# Patient Record
Sex: FEMALE | Race: WHITE | NOT HISPANIC OR LATINO | Employment: FULL TIME | URBAN - METROPOLITAN AREA
[De-identification: names, ages, dates, MRNs, and addresses within clinical notes are randomized per-mention and may not be internally consistent; named-entity substitution may affect disease eponyms.]

---

## 2017-05-03 ENCOUNTER — COMMUNICATION - HEALTHEAST (OUTPATIENT)
Dept: FAMILY MEDICINE | Facility: CLINIC | Age: 32
End: 2017-05-03

## 2017-05-10 ENCOUNTER — COMMUNICATION - HEALTHEAST (OUTPATIENT)
Dept: FAMILY MEDICINE | Facility: CLINIC | Age: 32
End: 2017-05-10

## 2017-07-18 ENCOUNTER — OFFICE VISIT - HEALTHEAST (OUTPATIENT)
Dept: FAMILY MEDICINE | Facility: CLINIC | Age: 32
End: 2017-07-18

## 2017-07-18 DIAGNOSIS — J35.1 CHRONIC TONSILLAR HYPERTROPHY: ICD-10-CM

## 2017-07-18 DIAGNOSIS — Z30.41 ENCOUNTER FOR SURVEILLANCE OF CONTRACEPTIVE PILLS: ICD-10-CM

## 2017-07-18 ASSESSMENT — MIFFLIN-ST. JEOR: SCORE: 1621.21

## 2018-06-28 ENCOUNTER — COMMUNICATION - HEALTHEAST (OUTPATIENT)
Dept: FAMILY MEDICINE | Facility: CLINIC | Age: 33
End: 2018-06-28

## 2018-06-28 DIAGNOSIS — Z30.41 ENCOUNTER FOR SURVEILLANCE OF CONTRACEPTIVE PILLS: ICD-10-CM

## 2018-09-20 ENCOUNTER — COMMUNICATION - HEALTHEAST (OUTPATIENT)
Dept: FAMILY MEDICINE | Facility: CLINIC | Age: 33
End: 2018-09-20

## 2018-09-20 DIAGNOSIS — Z30.41 ENCOUNTER FOR SURVEILLANCE OF CONTRACEPTIVE PILLS: ICD-10-CM

## 2018-12-14 ENCOUNTER — COMMUNICATION - HEALTHEAST (OUTPATIENT)
Dept: FAMILY MEDICINE | Facility: CLINIC | Age: 33
End: 2018-12-14

## 2018-12-14 DIAGNOSIS — Z30.41 ENCOUNTER FOR SURVEILLANCE OF CONTRACEPTIVE PILLS: ICD-10-CM

## 2019-03-14 ENCOUNTER — COMMUNICATION - HEALTHEAST (OUTPATIENT)
Dept: FAMILY MEDICINE | Facility: CLINIC | Age: 34
End: 2019-03-14

## 2019-03-14 DIAGNOSIS — Z30.41 ENCOUNTER FOR SURVEILLANCE OF CONTRACEPTIVE PILLS: ICD-10-CM

## 2019-08-12 ENCOUNTER — COMMUNICATION - HEALTHEAST (OUTPATIENT)
Dept: FAMILY MEDICINE | Facility: CLINIC | Age: 34
End: 2019-08-12

## 2019-08-12 DIAGNOSIS — Z30.41 ENCOUNTER FOR SURVEILLANCE OF CONTRACEPTIVE PILLS: ICD-10-CM

## 2019-11-15 ENCOUNTER — AMBULATORY - HEALTHEAST (OUTPATIENT)
Dept: FAMILY MEDICINE | Facility: CLINIC | Age: 34
End: 2019-11-15

## 2019-11-18 ENCOUNTER — OFFICE VISIT - HEALTHEAST (OUTPATIENT)
Dept: FAMILY MEDICINE | Facility: CLINIC | Age: 34
End: 2019-11-18

## 2019-11-18 DIAGNOSIS — Z00.00 ROUTINE GENERAL MEDICAL EXAMINATION AT A HEALTH CARE FACILITY: ICD-10-CM

## 2019-11-18 DIAGNOSIS — Z12.4 SCREENING FOR MALIGNANT NEOPLASM OF CERVIX: ICD-10-CM

## 2019-11-18 ASSESSMENT — MIFFLIN-ST. JEOR: SCORE: 1667.7

## 2019-11-20 LAB
HPV SOURCE: NORMAL
HUMAN PAPILLOMA VIRUS 16 DNA: NEGATIVE
HUMAN PAPILLOMA VIRUS 18 DNA: NEGATIVE
HUMAN PAPILLOMA VIRUS FINAL DIAGNOSIS: NORMAL
HUMAN PAPILLOMA VIRUS OTHER HR: NEGATIVE
SPECIMEN DESCRIPTION: NORMAL

## 2019-11-22 ENCOUNTER — COMMUNICATION - HEALTHEAST (OUTPATIENT)
Dept: FAMILY MEDICINE | Facility: CLINIC | Age: 34
End: 2019-11-22

## 2020-02-19 ENCOUNTER — COMMUNICATION - HEALTHEAST (OUTPATIENT)
Dept: FAMILY MEDICINE | Facility: CLINIC | Age: 35
End: 2020-02-19

## 2020-02-19 DIAGNOSIS — Z30.41 ENCOUNTER FOR SURVEILLANCE OF CONTRACEPTIVE PILLS: ICD-10-CM

## 2021-04-26 ENCOUNTER — COMMUNICATION - HEALTHEAST (OUTPATIENT)
Dept: FAMILY MEDICINE | Facility: CLINIC | Age: 36
End: 2021-04-26

## 2021-04-26 DIAGNOSIS — Z30.41 ENCOUNTER FOR SURVEILLANCE OF CONTRACEPTIVE PILLS: ICD-10-CM

## 2021-05-31 VITALS — WEIGHT: 179.5 LBS | BODY MASS INDEX: 24.31 KG/M2 | HEIGHT: 72 IN

## 2021-05-31 NOTE — TELEPHONE ENCOUNTER
RN cannot approve Refill Request    RN can NOT refill this medication Protocol failed and NO refill given. Last office visit: 7/18/2017 Marla Yao MD Last Physical: Visit date not found Last MTM visit: Visit date not found Last visit same specialty: 7/18/2017 Marla Yao MD.  Next visit within 3 mo: Visit date not found  Next physical within 3 mo: Visit date not found      Hiral Yoon, [facility] Triage/Med Refill 8/12/2019    Requested Prescriptions   Pending Prescriptions Disp Refills     norethindrone-ethinyl estradiol (NORTREL 1/35, 28,) 1-35 mg-mcg per tablet 84 tablet 0     Sig: Take 1 tablet by mouth daily. As directed       Oral Contraceptives Protocol Failed - 8/12/2019  3:57 PM        Failed - Visit with PCP or prescribing provider visit in last 12 months      Last office visit with prescriber/PCP: 7/18/2017 Marla Yao MD OR same dept: Visit date not found OR same specialty: 7/18/2017 Marla Yao MD  Last physical: Visit date not found Last MTM visit: Visit date not found   Next visit within 3 mo: Visit date not found  Next physical within 3 mo: Visit date not found  Prescriber OR PCP: Marla Yao MD  Last diagnosis associated with med order: 1. Encounter for surveillance of contraceptive pills  - norethindrone-ethinyl estradiol (NORTREL 1/35, 28,) 1-35 mg-mcg per tablet; Take 1 tablet by mouth daily. As directed  Dispense: 84 tablet; Refill: 0    If protocol passes may refill for 12 months if within 3 months of last provider visit (or a total of 15 months).

## 2021-06-04 VITALS
TEMPERATURE: 98.5 F | DIASTOLIC BLOOD PRESSURE: 74 MMHG | BODY MASS INDEX: 25.47 KG/M2 | HEIGHT: 72 IN | WEIGHT: 188 LBS | RESPIRATION RATE: 12 BRPM | HEART RATE: 72 BPM | SYSTOLIC BLOOD PRESSURE: 104 MMHG

## 2021-06-06 NOTE — TELEPHONE ENCOUNTER
FYI - Status Update  Who is Calling: Pharmacy  Update: Patient is out of medication.  Pharmacy states they have sent previous refill requests last week but no prior request is found.  Please send today.  Okay to leave a detailed message?:  No return call needed

## 2021-06-06 NOTE — TELEPHONE ENCOUNTER
Refill Approved    Rx renewed per Medication Renewal Policy. Medication was last renewed on 8/13/19.    Scarlett Marcano, [facility] Connection Triage/Med Refill 2/19/2020     Requested Prescriptions   Pending Prescriptions Disp Refills     norethindrone-ethinyl estradiol (NORTREL 1/35, 28,) 1-35 mg-mcg per tablet 84 tablet 0     Sig: Take 1 tablet by mouth daily. As directed       Oral Contraceptives Protocol Passed - 2/19/2020 10:08 AM        Passed - Visit with PCP or prescribing provider visit in last 12 months      Last office visit with prescriber/PCP: 7/18/2017 Marla Yao MD OR same dept: Visit date not found OR same specialty: 7/18/2017 Marla Yao MD  Last physical: 11/18/2019 Last MTM visit: Visit date not found   Next visit within 3 mo: Visit date not found  Next physical within 3 mo: Visit date not found  Prescriber OR PCP: Marla Yao MD  Last diagnosis associated with med order: 1. Encounter for surveillance of contraceptive pills  - norethindrone-ethinyl estradiol (NORTREL 1/35, 28,) 1-35 mg-mcg per tablet; Take 1 tablet by mouth daily. As directed  Dispense: 84 tablet; Refill: 0    If protocol passes may refill for 12 months if within 3 months of last provider visit (or a total of 15 months).

## 2021-06-11 NOTE — PROGRESS NOTES
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 No recent infections.      Encounter for surveillance of contraceptive pills- doing well with current pills, continue.   -     norethindrone-ethinyl estradiol (NECON 1/35, 28,) 1-35 mg-mcg per tablet; TAKE 1 TABLET BY MOUTH DAILY AS DIRECTED  Dispense: 90 tablet; Refill: 3      RTC when due for a Pap, sooner prn.     Subjective:    Patient ID: Phoebe Alva is a 32 y.o. female.    HPI:  Here to follow up on her OCP's, not having any problems with them,  In long term monogamous relationship with Ric, who is also an .  Occ. Has spotting between periods, thinks it's related to stress and sleep deprivation.  Hasn't had her tonsils out, can't find a good time to do it because of the recovery time required.     The following portions of the patient's history were reviewed and updated as appropriate: allergies, current medications, past family history, past medical history, past social history, past surgical history and problem list.    Review of Systems      10 sys rev neg other than HPI    Objective:    Physical Exam       Patient is in no apparent physical distress.  Vitals are as recorded, tonsils enlarged, no erythema or exudate. Thyroid not enlarged. Cardiovascular :  Regular rate and rhythm with no murmurs.  Lungs are clear with good air movement bilaterally.  Extremities are without edema.  Gait is normal.  Skin is without rashes.  Mood and affect are appropriate.

## 2021-06-25 NOTE — TELEPHONE ENCOUNTER
RN cannot approve Refill Request    RN can NOT refill this medication overdue for office visits and/or labs.    Santiago Jones, [facility] Triage/Med Refill 3/18/2019    Requested Prescriptions   Pending Prescriptions Disp Refills     NORTREL 1/35, 28, 1-35 mg-mcg per tablet [Pharmacy Med Name: NORTREL 1/35 TABLETS 28] 84 tablet 0     Sig: TAKE 1 TABLET BY MOUTH DAILY AS DIRECTED    Oral Contraceptives Protocol Failed - 3/14/2019 12:03 PM       Failed - Visit with PCP or prescribing provider visit in last 12 months     Last office visit with prescriber/PCP: 7/18/2017 Marla Yao MD OR same dept: Visit date not found OR same specialty: 7/18/2017 Marla Yao MD  Last physical: Visit date not found Last MTM visit: Visit date not found   Next visit within 3 mo: Visit date not found  Next physical within 3 mo: Visit date not found  Prescriber OR PCP: Marla Yao MD  Last diagnosis associated with med order: 1. Encounter for surveillance of contraceptive pills  - NORTREL 1/35, 28, 1-35 mg-mcg per tablet [Pharmacy Med Name: NORTREL 1/35 TABLETS 28]; TAKE 1 TABLET BY MOUTH DAILY AS DIRECTED  Dispense: 84 tablet; Refill: 0    If protocol passes may refill for 12 months if within 3 months of last provider visit (or a total of 15 months).

## 2021-08-08 ENCOUNTER — HEALTH MAINTENANCE LETTER (OUTPATIENT)
Age: 36
End: 2021-08-08

## 2021-10-04 ENCOUNTER — HEALTH MAINTENANCE LETTER (OUTPATIENT)
Age: 36
End: 2021-10-04

## 2021-11-29 ENCOUNTER — OFFICE VISIT (OUTPATIENT)
Dept: FAMILY MEDICINE | Facility: CLINIC | Age: 36
End: 2021-11-29
Payer: COMMERCIAL

## 2021-11-29 VITALS
BODY MASS INDEX: 26.21 KG/M2 | RESPIRATION RATE: 16 BRPM | DIASTOLIC BLOOD PRESSURE: 88 MMHG | HEART RATE: 76 BPM | WEIGHT: 193.5 LBS | SYSTOLIC BLOOD PRESSURE: 116 MMHG | OXYGEN SATURATION: 97 % | HEIGHT: 72 IN | TEMPERATURE: 98.4 F

## 2021-11-29 DIAGNOSIS — L98.9 SKIN LESION: ICD-10-CM

## 2021-11-29 DIAGNOSIS — Z12.4 SCREENING FOR MALIGNANT NEOPLASM OF CERVIX: Primary | ICD-10-CM

## 2021-11-29 DIAGNOSIS — Z23 NEED FOR VACCINATION: ICD-10-CM

## 2021-11-29 PROCEDURE — 0064A COVID-19,PF,MODERNA (18+ YRS BOOSTER .25ML): CPT | Performed by: FAMILY MEDICINE

## 2021-11-29 PROCEDURE — 99213 OFFICE O/P EST LOW 20 MIN: CPT | Mod: 25 | Performed by: FAMILY MEDICINE

## 2021-11-29 PROCEDURE — G0123 SCREEN CERV/VAG THIN LAYER: HCPCS | Performed by: FAMILY MEDICINE

## 2021-11-29 PROCEDURE — 87624 HPV HI-RISK TYP POOLED RSLT: CPT | Performed by: FAMILY MEDICINE

## 2021-11-29 PROCEDURE — 91306 COVID-19,PF,MODERNA (18+ YRS BOOSTER .25ML): CPT | Performed by: FAMILY MEDICINE

## 2021-11-29 RX ORDER — NORETHINDRONE AND ETHINYL ESTRADIOL 1 MG-35MCG
KIT ORAL
COMMUNITY
Start: 2021-11-10 | End: 2022-05-05

## 2021-11-29 ASSESSMENT — MIFFLIN-ST. JEOR: SCORE: 1692.65

## 2021-12-01 LAB
HUMAN PAPILLOMA VIRUS 16 DNA: NEGATIVE
HUMAN PAPILLOMA VIRUS 18 DNA: NEGATIVE
HUMAN PAPILLOMA VIRUS FINAL DIAGNOSIS: ABNORMAL
HUMAN PAPILLOMA VIRUS OTHER HR: POSITIVE

## 2021-12-02 NOTE — PROGRESS NOTES
"Phoebe was seen today for follow up, gyn exam and referral.    Diagnoses and all orders for this visit:    Screening for malignant neoplasm of cervix- no hx abnl Pap  -     Pap Diagnostic Thin Prep with HPV; Future  -     Pap Diagnostic Thin Prep with HPV  -     HPV High Risk Types DNA Cervical    Need for vaccination - booster dose today  -     COVID-19,PF,MODERNA (18+ YRS BOOSTER .25ML)    Skin lesion  Lower left leg, right inner thigh.  Appear benign. She will make appt for removal.         S:  Phoebe is here for a Pap, has been a few years.  No concerns about STD's , in a long-term mutually monogamous relationship with a male.  No issues with her OCP's.    Is working two full-time jobs, enjoys her work.    Has a hard red bump on the back of her left leg that has been there a while that she would like removed because it bothers her.  Another one on her left upper thigh. No personal hx skin cancer.     ROS neg other than HPI  Past Medical, social, family histories, medications, and allergies reviewed and updated  Social History     Tobacco Use     Smoking status: Never Smoker     Smokeless tobacco: Never Used             PAP / HPV Latest Ref Rng & Units 11/29/2021 11/18/2019   PAP Negative for squamous intraepithelial lesion or malignancy. - Negative for squamous intraepithelial lesion or malignancy  Electronically signed by Fadumo Dvais CT (ASCP) on 12/2/2019 at 10:01 AM     HPV16 Negative Negative Negative   HPV18 Negative Negative Negative   HRHPV Negative Positive(A) Negative     O:  /88 (BP Location: Left arm, Patient Position: Sitting, Cuff Size: Adult Regular)   Pulse 76   Temp 98.4  F (36.9  C) (Oral)   Resp 16   Ht 1.842 m (6' 0.5\")   Wt 87.8 kg (193 lb 8 oz)   LMP 11/11/2021 (Exact Date)   SpO2 97%   BMI 25.88 kg/m     Patient is in no apparent physical distress.      Head and face are normal, she is masked.  Conjunctiva are clear.Extremities are without edema.  Gait is normal.  Skin " is without rashes. :  Ext genitalia nl.  Cervix and vag mucosa without lesions.  Nl amt discharge in vaginal vault.  Mood and affect are appropriate.                                                                  She          TINA PA MD  [facility] [place] and face are normal.  Conjunctiva are clear.  Neck is without adenopathy or masses.  Cardiovascular :  Regular rate and rhythm with no murmurs.  Lungs are clear with good air movement bilaterally.Extremities are without edema.  Gait is normal.  Skin is without rashes.  Mood and affect are appropriate.

## 2021-12-06 ENCOUNTER — PATIENT OUTREACH (OUTPATIENT)
Dept: FAMILY MEDICINE | Facility: CLINIC | Age: 36
End: 2021-12-06
Payer: COMMERCIAL

## 2021-12-06 DIAGNOSIS — R87.810 CERVICAL HIGH RISK HPV (HUMAN PAPILLOMAVIRUS) TEST POSITIVE: ICD-10-CM

## 2021-12-06 LAB
BKR LAB AP GYN ADEQUACY: NORMAL
BKR LAB AP GYN INTERPRETATION: NORMAL
BKR LAB AP GYN OTHER FINDINGS: NORMAL
BKR LAB AP HPV REFLEX: NORMAL
BKR LAB AP LMP: NORMAL
BKR LAB AP PREVIOUS ABNORMAL: NORMAL
PATH REPORT.COMMENTS IMP SPEC: NORMAL
PATH REPORT.COMMENTS IMP SPEC: NORMAL
PATH REPORT.RELEVANT HX SPEC: NORMAL

## 2022-03-29 ENCOUNTER — TELEPHONE (OUTPATIENT)
Dept: FAMILY MEDICINE | Facility: CLINIC | Age: 37
End: 2022-03-29
Payer: COMMERCIAL

## 2022-03-29 NOTE — TELEPHONE ENCOUNTER
----- Message from Marla Yao MD sent at 3/29/2022  1:43 PM CDT -----  Regarding: April 11th 9 am appt  She is scheduled for removal of skin lesions, which is a procedure and should be 40 mins instead of 20 .  Please change so she has 40 mins for her appt.  Thanks.

## 2022-03-29 NOTE — TELEPHONE ENCOUNTER
Called and LVM that appt needs to be rescheduled.Appt should be 40 minutes long not 20 minutes.  I did schedule pt for 4.18.22 at 8:40am for a 40 min appt. Advised pt to call back to confirm she received the message.

## 2022-04-18 ENCOUNTER — OFFICE VISIT (OUTPATIENT)
Dept: FAMILY MEDICINE | Facility: CLINIC | Age: 37
End: 2022-04-18
Payer: COMMERCIAL

## 2022-04-18 VITALS
TEMPERATURE: 98.2 F | SYSTOLIC BLOOD PRESSURE: 126 MMHG | WEIGHT: 182.25 LBS | OXYGEN SATURATION: 100 % | HEART RATE: 65 BPM | RESPIRATION RATE: 16 BRPM | BODY MASS INDEX: 24.69 KG/M2 | HEIGHT: 72 IN | DIASTOLIC BLOOD PRESSURE: 78 MMHG

## 2022-04-18 DIAGNOSIS — B07.9 BENIGN SKIN LESION EXCLUDING PLANTAR WART: Primary | ICD-10-CM

## 2022-04-18 DIAGNOSIS — L98.9 BENIGN SKIN LESION EXCLUDING PLANTAR WART: Primary | ICD-10-CM

## 2022-04-18 DIAGNOSIS — L57.0 BENIGN SKIN LESION EXCLUDING PLANTAR WART: Primary | ICD-10-CM

## 2022-04-18 PROCEDURE — 11301 SHAVE SKIN LESION 0.6-1.0 CM: CPT | Performed by: FAMILY MEDICINE

## 2022-04-18 NOTE — PROGRESS NOTES
"Phoebe was seen today for biopsy.    Diagnoses and all orders for this visit:    Benign skin lesion excluding plantar wart  -     SHAV SKIN LESION TRUNK/ARM/LEG 0.6-1.0 CM    Removed without complications.  Instructions given for wound care.  RTC if signs of infection develop or if lesion returns.       S:  Phoebe is here to have a bump removed from her left lower leg. It has been there a long time and is painful if she bumps it or there is pressure on it.  No injury that she can recall.  Has gotten bigger. Has one she can feel in her left inner thigh also.  That one doesn't bother he so she doesn't want it removed today.       ROS neg   Past Medical, social, family histories, medications, and allergies reviewed and updated    Current Outpatient Medications   Medication Instructions     NORTREL 1/35, 28, 1-35 MG-MCG tablet TAKE 1 TABLET BY MOUTH DAILY AS DIRECTED         O:  /78 (BP Location: Left arm, Patient Position: Sitting, Cuff Size: Adult Regular)   Pulse 65   Temp 98.2  F (36.8  C) (Oral)   Resp 16   Ht 1.842 m (6' 0.5\")   Wt 82.7 kg (182 lb 4 oz)   LMP 04/13/2022 (Exact Date)   SpO2 100%   BMI 24.38 kg/m    Patient is in no apparent physical distress.    Head and face are normal.  Conjunctiva are clear. extrem without  edema.  Gait is normal.  Skin is without rashes. 1 cm skin-colored round lesion left calf.  No irregular borders.  No bleeding, irritation, raised borders.  Mood and affect are appropriate.      Procedure Note:  Area prepped and draped in sterile fashion.  1% lidocaine used for local anesthesia.  Lesion excised with 15 blade scalpel. Good hemostasis achieved with sliver nitrate.  Complications none, pt tolerated procedure will.      "

## 2022-05-05 DIAGNOSIS — Z76.0 ENCOUNTER FOR MEDICATION REFILL: Primary | ICD-10-CM

## 2022-05-09 RX ORDER — NORETHINDRONE AND ETHINYL ESTRADIOL 1 MG-35MCG
KIT ORAL
Qty: 84 TABLET | Refills: 3 | Status: SHIPPED | OUTPATIENT
Start: 2022-05-09 | End: 2023-02-08

## 2022-09-11 ENCOUNTER — HEALTH MAINTENANCE LETTER (OUTPATIENT)
Age: 37
End: 2022-09-11

## 2022-11-08 ENCOUNTER — PATIENT OUTREACH (OUTPATIENT)
Dept: FAMILY MEDICINE | Facility: CLINIC | Age: 37
End: 2022-11-08

## 2022-11-08 NOTE — LETTER
November 8, 2022      Phoebe Alva  [address] [facility] [address]        Dear ,    This letter is to remind you that you are due for your follow-up Pap smear and Human Papillomavirus (HPV) test.    Please call [phone] to schedule your appointment at your earliest convenience.    If you have completed the appointment outside of the [facility] system, please have the records forwarded to our office. We will update your chart for your provider to review before your next annual wellness visit.     Thank you for choosing [facility]!      Sincerely,    Your [facility] Care Team

## 2023-01-05 PROBLEM — R87.810 CERVICAL HIGH RISK HPV (HUMAN PAPILLOMAVIRUS) TEST POSITIVE: Status: ACTIVE | Noted: 2021-11-29

## 2023-01-31 NOTE — PROGRESS NOTES
ASSESSMENT: Phoebe Alva is a 38 year old female with past medical history significant for anxiety, acid reflux who presents today for new patient evaluation of chronic intermittent right low back/upper buttock pain.  Pain is most consistent with sacroiliac joint dysfunction.  Patient had intermittent flares of pain.  Most recent flare occurred last month and lasted 10 to 14 days.  Flare has resolved.  She has a positive Candi finger test.  She did have reproduction of pain with acid infarctive numerous x1.  She is neurologically intact.  She has some secondary myofascial pain.    PLAN:  A shared decision making model was used.  The patient's values and choices were respected.  The following represents what was discussed and decided upon by the physician assistant and the patient.      1.  DIAGNOSTIC TESTS: I ordered x-rays of the sacroiliac joints.  - If pain persist she may need an MRI lumbar spine to evaluate for possible pain generators in that area.    2.  PHYSICAL THERAPY:  Discussed the importance of core strengthening, ROM, stretching exercises with the patient and how each of these entities is important in decreasing pain.  Explained to the patient that the purpose of physical therapy is to teach the patient a home exercise program.  These exercises need to be performed every day in order to decrease pain and prevent future occurrences of pain.  I entered a referral to physical therapy.  - We could consider pelvic floor physical therapy if pain persists.    3.  MEDICATIONS: No changes are made to the patient's medications.  She can continue using ibuprofen as needed.    4.  INTERVENTIONS:  - We discussed osteopathic manipulation as an option if physical therapy does not help.  - We also discussed a sacroiliac joint injection if conservative treatments fail.    5.  PATIENT EDUCATION:  - I recommended that the patient get a sacroiliac belt.  She can wear this as needed to help manage pain.    6.   FOLLOW-UP:   Patient will follow up with me in about 2 months.  If she has questions or concerns in the meantime, she should not hesitate to call.      SUBJECTIVE:  Phoebe Alva  Is a 38 year old female who presents today for new patient evaluation of low back/upper buttock pain.  Patient states that she has had some low back problems ever since her early to mid 20s when she had severe back pain for a few days.  Pain has been getting gradually worse over the years and has been ramping up more especially over the past 3 years.  She states that she gets intermittent flareups of pain.  Flareups occur every 1 to 3 months.  Typical flareups resolve on their own within about 5 days with rest.  However, last month patient had her most severe flare of pain ever.  She states this flare began after doing we will pose in yoga.  She felt a shift in her lower back and severe pain.  She continued to have pain lasting 10 to 14 days.  She was unable to work out and had difficulty with stairs.  She had to lie on her back to work.  She states her flare has resolved but she would like to be proactive to prevent recurrent flares.    Patient complains of right low back/upper buttock pain.  Patient points with 1 finger to the right SI joint when asked to point to the pain.  She states that sometimes the pain wraps around the pelvis into the right hip area.  She occasionally feels a numbing tingling sensation in the same area as her pain which can extend into her right posterior thigh.  Denies any pain down the leg.  Denies symptoms distal to the knee.  When she is in a flare any activity makes her pain worse and she has difficulty sleeping because of the pain.  Rest helps to alleviate the pain.  She denies any left-sided symptoms.  When she is in a flare of pain she feels the muscles tighten on the entire right side of her back extending up to her neck.  She is an  and even has difficulty singing because of the tension on  her right side during her last flare.  She denies loss of bowel or bladder control.  Denies recent fevers, chills, sweats.    Treatment to date:  - Chiropractic treatment 2022 with slight relief but she did not feel comfortable continuing with treatment and did not have insurance coverage for this  - Ibuprofen as needed which is helpful    Current Outpatient Medications   Medication Sig Dispense Refill     ibuprofen (ADVIL/MOTRIN) 200 MG tablet Take 200 mg by mouth as needed for pain (Patient not taking: Reported on 2/2/2023)       NORTREL 1/35, 28, 1-35 MG-MCG tablet TAKE 1 TABLET BY MOUTH DAILY AS DIRECTED 84 tablet 3         No Known Allergies    Past Medical History:   Diagnosis Date     Adverse Effect Of Combined Estrogen And Progestogen     Created by Conversion      Cervical high risk HPV (human papillomavirus) test positive 11/29/2021 11/8/13 NIL 11/18/19 NIL pap, neg HPV 11/29/21 NIL pap, +HR HPV (not 16/18). Plan: cotest in 1 year        Patient Active Problem List   Diagnosis     Chronic tonsillar hypertrophy     Cervical high risk HPV (human papillomavirus) test positive   Anxiety  Acid reflux    Surgical history: None    Family history: Mother had cancer    Social history: Patient is single.  She is a nonprofit leader and artistic .  She denies tobacco use.  Drinks alcohol moderately.  Denies illicit drug use.      ROS: Positive for headache, sciatica.  Specifically negative for bowel/bladder dysfunction, fevers,chills, appetite changes, unexplained weight loss.   Otherwise 13 systems reviewed are negative.  Please see the patient's intake questionnaire from today for details.      OBJECTIVE:  PHYSICAL EXAMINATION:    CONSTITUTIONAL:  Vital signs as above.  No acute distress.  The patient is well nourished and well groomed.  PSYCHIATRIC:  The patient is awake, alert, oriented to person, place, time and answering questions appropriately with clear speech.    HEENT: Normocephalic, atraumatic.   Sclera clear.  Neck is supple.  SKIN:  Skin over the face, bilateral lower extremities, and posterior torso is clean, dry, intact without rashes.    GAIT:  Gait is non-antalgic.  The patient is able to heel and toe walk without significant difficulty.    STANDING EXAMINATION: No tenderness palpation bilateral lumbar paraspinous muscles.  Tender to palpation right sacroiliac joint.  Lumbar flexion intact.  Lumbar extension intact.  MUSCLE STRENGTH:  The patient has 5/5 strength for the bilateral hip flexors, knee flexors/extensors, ankle dorsiflexors/plantar flexors, great toe extensors, ankle evertors/invertors.  NEUROLOGICAL: 2+ patellar, and achilles reflexes bilaterally.  Negative Babinski's bilaterally.  No ankle clonus bilaterally. Sensation to light touch is intact in the bilateral L4, L5, and S1 dermatomes.  VASCULAR:  2/4 dorsalis pedis pulses bilaterally.  Bilateral lower extremities are warm.  There is no pitting edema of the bilateral lower extremities.  ABDOMINAL:  Soft, non-distended, non-tender throughout all quadrants.  No pulsatile mass palpated in the left lower quadrant.  LYMPH NODES:  No palpable or tender inguinal lymph nodes.  MUSCULOSKELETAL: Patient does have some hypermobility.  She is able to put her palms on floor.  She is able to extend fingers back 90 degrees.  Mild hyperextension both elbows.  Negative straight leg raise bilaterally.  Negative pelvic distraction test.  Negative thigh thrust bilaterally.  Positive Javier's test recommended Javier's test left.  Negative Gaenslen's tests bilaterally.  Negative Yeoman's test bilaterally.

## 2023-02-02 ENCOUNTER — OFFICE VISIT (OUTPATIENT)
Dept: PHYSICAL MEDICINE AND REHAB | Facility: CLINIC | Age: 38
End: 2023-02-02
Payer: COMMERCIAL

## 2023-02-02 VITALS
SYSTOLIC BLOOD PRESSURE: 128 MMHG | HEART RATE: 72 BPM | WEIGHT: 191.1 LBS | DIASTOLIC BLOOD PRESSURE: 75 MMHG | HEIGHT: 72 IN | BODY MASS INDEX: 25.88 KG/M2

## 2023-02-02 DIAGNOSIS — M53.3 SACROILIAC JOINT PAIN: Primary | ICD-10-CM

## 2023-02-02 PROCEDURE — 99204 OFFICE O/P NEW MOD 45 MIN: CPT | Performed by: PHYSICIAN ASSISTANT

## 2023-02-02 RX ORDER — IBUPROFEN 200 MG
200 TABLET ORAL PRN
COMMUNITY
End: 2023-02-08

## 2023-02-02 ASSESSMENT — PAIN SCALES - GENERAL: PAINLEVEL: NO PAIN (0)

## 2023-02-02 NOTE — PATIENT INSTRUCTIONS
[facility] Scheduling    Please call [phone] to schedule your image(s) (select option#1). There are 2 different locations, see below. You can do walk-in visits for xray only images if you want.     [facility]  [address] [id number]    [place] [id number] [facility] [address]     An order for physicaltherapy has been provided today.  Someone will call you to schedule physical therapy or you can call [phone] to schedule physical therapy.  It will be very important for you to do your physical therapy exercises on aregular basis to decrease your pain and prevent future flares of pain.     You can purchase a sacroiliac belt online.  Wear this as needed to help manage pain.

## 2023-02-02 NOTE — LETTER
2/2/2023         RE: Phoebe Alva  [address] [facility] [address]        Dear Colleague,    Thank you for referring your patient, Phoebe Alva, to the [facility]. Please see a copy of my visit note below.    ASSESSMENT: Phoebe Alva is a 38 year old female with past medical history significant for anxiety, acid reflux who presents today for new patient evaluation of chronic intermittent right low back/upper buttock pain.  Pain is most consistent with sacroiliac joint dysfunction.  Patient had intermittent flares of pain.  Most recent flare occurred last month and lasted 10 to 14 days.  Flare has resolved.  She has a positive Candi finger test.  She did have reproduction of pain with acid infarctive numerous x1.  She is neurologically intact.  She has some secondary myofascial pain.    PLAN:  A shared decision making model was used.  The patient's values and choices were respected.  The following represents what was discussed and decided upon by the physician assistant and the patient.      1.  DIAGNOSTIC TESTS: I ordered x-rays of the sacroiliac joints.  - If pain persist she may need an MRI lumbar spine to evaluate for possible pain generators in that area.    2.  PHYSICAL THERAPY:  Discussed the importance of core strengthening, ROM, stretching exercises with the patient and how each of these entities is important in decreasing pain.  Explained to the patient that the purpose of physical therapy is to teach the patient a home exercise program.  These exercises need to be performed every day in order to decrease pain and prevent future occurrences of pain.  I entered a referral to physical therapy.  - We could consider pelvic floor physical therapy if pain persists.    3.  MEDICATIONS: No changes are made to the patient's medications.  She can continue using ibuprofen as needed.    4.  INTERVENTIONS:  - We discussed osteopathic manipulation as an option if physical  therapy does not help.  - We also discussed a sacroiliac joint injection if conservative treatments fail.    5.  PATIENT EDUCATION:  - I recommended that the patient get a sacroiliac belt.  She can wear this as needed to help manage pain.    6.  FOLLOW-UP:   Patient will follow up with me in about 2 months.  If she has questions or concerns in the meantime, she should not hesitate to call.      SUBJECTIVE:  Phoebe Alva  Is a 38 year old female who presents today for new patient evaluation of low back/upper buttock pain.  Patient states that she has had some low back problems ever since her early to mid 20s when she had severe back pain for a few days.  Pain has been getting gradually worse over the years and has been ramping up more especially over the past 3 years.  She states that she gets intermittent flareups of pain.  Flareups occur every 1 to 3 months.  Typical flareups resolve on their own within about 5 days with rest.  However, last month patient had her most severe flare of pain ever.  She states this flare began after doing we will pose in yoga.  She felt a shift in her lower back and severe pain.  She continued to have pain lasting 10 to 14 days.  She was unable to work out and had difficulty with stairs.  She had to lie on her back to work.  She states her flare has resolved but she would like to be proactive to prevent recurrent flares.    Patient complains of right low back/upper buttock pain.  Patient points with 1 finger to the right SI joint when asked to point to the pain.  She states that sometimes the pain wraps around the pelvis into the right hip area.  She occasionally feels a numbing tingling sensation in the same area as her pain which can extend into her right posterior thigh.  Denies any pain down the leg.  Denies symptoms distal to the knee.  When she is in a flare any activity makes her pain worse and she has difficulty sleeping because of the pain.  Rest helps to alleviate the pain.   She denies any left-sided symptoms.  When she is in a flare of pain she feels the muscles tighten on the entire right side of her back extending up to her neck.  She is an  and even has difficulty singing because of the tension on her right side during her last flare.  She denies loss of bowel or bladder control.  Denies recent fevers, chills, sweats.    Treatment to date:  - Chiropractic treatment 2022 with slight relief but she did not feel comfortable continuing with treatment and did not have insurance coverage for this  - Ibuprofen as needed which is helpful    Current Outpatient Medications   Medication Sig Dispense Refill     ibuprofen (ADVIL/MOTRIN) 200 MG tablet Take 200 mg by mouth as needed for pain (Patient not taking: Reported on 2/2/2023)       NORTREL 1/35, 28, 1-35 MG-MCG tablet TAKE 1 TABLET BY MOUTH DAILY AS DIRECTED 84 tablet 3         No Known Allergies    Past Medical History:   Diagnosis Date     Adverse Effect Of Combined Estrogen And Progestogen     Created by Conversion      Cervical high risk HPV (human papillomavirus) test positive 11/29/2021 11/8/13 NIL 11/18/19 NIL pap, neg HPV 11/29/21 NIL pap, +HR HPV (not 16/18). Plan: cotest in 1 year        Patient Active Problem List   Diagnosis     Chronic tonsillar hypertrophy     Cervical high risk HPV (human papillomavirus) test positive   Anxiety  Acid reflux    Surgical history: None    Family history: Mother had cancer    Social history: Patient is single.  She is a nonprofit leader and artistic .  She denies tobacco use.  Drinks alcohol moderately.  Denies illicit drug use.      ROS: Positive for headache, sciatica.  Specifically negative for bowel/bladder dysfunction, fevers,chills, appetite changes, unexplained weight loss.   Otherwise 13 systems reviewed are negative.  Please see the patient's intake questionnaire from today for details.      OBJECTIVE:  PHYSICAL EXAMINATION:    CONSTITUTIONAL:  Vital signs as  above.  No acute distress.  The patient is well nourished and well groomed.  PSYCHIATRIC:  The patient is awake, alert, oriented to person, place, time and answering questions appropriately with clear speech.    HEENT: Normocephalic, atraumatic.  Sclera clear.  Neck is supple.  SKIN:  Skin over the face, bilateral lower extremities, and posterior torso is clean, dry, intact without rashes.    GAIT:  Gait is non-antalgic.  The patient is able to heel and toe walk without significant difficulty.    STANDING EXAMINATION: No tenderness palpation bilateral lumbar paraspinous muscles.  Tender to palpation right sacroiliac joint.  Lumbar flexion intact.  Lumbar extension intact.  MUSCLE STRENGTH:  The patient has 5/5 strength for the bilateral hip flexors, knee flexors/extensors, ankle dorsiflexors/plantar flexors, great toe extensors, ankle evertors/invertors.  NEUROLOGICAL: 2+ patellar, and achilles reflexes bilaterally.  Negative Babinski's bilaterally.  No ankle clonus bilaterally. Sensation to light touch is intact in the bilateral L4, L5, and S1 dermatomes.  VASCULAR:  2/4 dorsalis pedis pulses bilaterally.  Bilateral lower extremities are warm.  There is no pitting edema of the bilateral lower extremities.  ABDOMINAL:  Soft, non-distended, non-tender throughout all quadrants.  No pulsatile mass palpated in the left lower quadrant.  LYMPH NODES:  No palpable or tender inguinal lymph nodes.  MUSCULOSKELETAL: Patient does have some hypermobility.  She is able to put her palms on floor.  She is able to extend fingers back 90 degrees.  Mild hyperextension both elbows.  Negative straight leg raise bilaterally.  Negative pelvic distraction test.  Negative thigh thrust bilaterally.  Positive Javier's test recommended Javier's test left.  Negative Gaenslen's tests bilaterally.  Negative Yeoman's test bilaterally.            Again, thank you for allowing me to participate in the care of your patient.         Sincerely,        Delmis Stratton PA-C

## 2023-02-08 ENCOUNTER — OFFICE VISIT (OUTPATIENT)
Dept: FAMILY MEDICINE | Facility: CLINIC | Age: 38
End: 2023-02-08
Payer: COMMERCIAL

## 2023-02-08 VITALS
WEIGHT: 195 LBS | BODY MASS INDEX: 26.41 KG/M2 | HEART RATE: 70 BPM | OXYGEN SATURATION: 99 % | HEIGHT: 72 IN | DIASTOLIC BLOOD PRESSURE: 68 MMHG | TEMPERATURE: 97.9 F | SYSTOLIC BLOOD PRESSURE: 108 MMHG | RESPIRATION RATE: 16 BRPM

## 2023-02-08 DIAGNOSIS — Z80.3 FAMILY HISTORY OF MALIGNANT NEOPLASM OF BREAST: ICD-10-CM

## 2023-02-08 DIAGNOSIS — Z00.00 ENCOUNTER FOR MEDICAL EXAMINATION TO ESTABLISH CARE: Primary | ICD-10-CM

## 2023-02-08 DIAGNOSIS — Z12.4 CERVICAL CANCER SCREENING: ICD-10-CM

## 2023-02-08 PROCEDURE — G0145 SCR C/V CYTO,THINLAYER,RESCR: HCPCS | Performed by: FAMILY MEDICINE

## 2023-02-08 PROCEDURE — 99213 OFFICE O/P EST LOW 20 MIN: CPT | Performed by: FAMILY MEDICINE

## 2023-02-08 PROCEDURE — 87624 HPV HI-RISK TYP POOLED RSLT: CPT | Performed by: FAMILY MEDICINE

## 2023-02-08 NOTE — PROGRESS NOTES
"  Phoebe was seen today for gyn exam, establish care.    Diagnoses and all orders for this visit:    Cervical cancer screening: Pap with cotesting today. History of normal cytology with other high risk HPV.   -     Pap Screen with HPV - recommended age 30 - 65 years  -     Cancer Risk Mgmt/Cancer Genetic Counseling Referral; Future    Family history of malignant neoplasm of breast: Mother with history of breast cancer- reviewed recommendation for genetic counseling.     Establish care with new provider: Her previous PCP has left- she is looking to establish with new provider. I reviewed her medical history and updated in epic.    Declines flu shot        Subjective   Phoebe is a 38 year old, presenting for the following health issues:  Gyn Exam      History of Present Illness       Reason for visit:  General checkup and HPV monitoring    She eats 2-3 servings of fruits and vegetables daily.She consumes 0 sweetened beverage(s) daily.She exercises with enough effort to increase her heart rate 30 to 60 minutes per day.  She exercises with enough effort to increase her heart rate 4 days per week.   She is taking medications regularly.     No concerns today  History of other high risk HPV- here for repeat pap with cotesting today  Mother was diagnosed with stage 1 breast cancer last year- she had lumpectomy and radiation and doing well      Review of Systems         Objective    /68 (BP Location: Left arm, Patient Position: Sitting, Cuff Size: Adult Large)   Pulse 70   Temp 97.9  F (36.6  C) (Oral)   Resp 16   Ht 1.842 m (6' 0.5\")   Wt 88.5 kg (195 lb)   LMP 01/27/2023 (Exact Date)   SpO2 99%   BMI 26.08 kg/m    There is no height or weight on file to calculate BMI.  Physical Exam   Gen: well appearing  CV: RRR no m/r/g  Resp: CTAB   (female): External genitalia is without lesions. Introitus is normal, vaginal walls pink and moist without lesions or evidence of trauma. No cervical motion tenderness. No " adnexal masses. No cervical lesions or discharge

## 2023-02-11 LAB
BKR LAB AP GYN ADEQUACY: NORMAL
BKR LAB AP GYN INTERPRETATION: NORMAL
BKR LAB AP HPV REFLEX: NORMAL
BKR LAB AP LMP: NORMAL
BKR LAB AP PREVIOUS ABNL DX: NORMAL
BKR LAB AP PREVIOUS ABNORMAL: NORMAL
PATH REPORT.COMMENTS IMP SPEC: NORMAL
PATH REPORT.COMMENTS IMP SPEC: NORMAL
PATH REPORT.RELEVANT HX SPEC: NORMAL

## 2023-02-14 ENCOUNTER — PATIENT OUTREACH (OUTPATIENT)
Dept: FAMILY MEDICINE | Facility: CLINIC | Age: 38
End: 2023-02-14
Payer: COMMERCIAL

## 2023-03-10 ENCOUNTER — OFFICE VISIT (OUTPATIENT)
Dept: OBGYN | Facility: CLINIC | Age: 38
End: 2023-03-10
Payer: COMMERCIAL

## 2023-03-10 VITALS
BODY MASS INDEX: 26.28 KG/M2 | DIASTOLIC BLOOD PRESSURE: 83 MMHG | HEIGHT: 72 IN | WEIGHT: 194 LBS | OXYGEN SATURATION: 99 % | HEART RATE: 72 BPM | SYSTOLIC BLOOD PRESSURE: 135 MMHG

## 2023-03-10 DIAGNOSIS — Z32.00 PREGNANCY EXAMINATION OR TEST, PREGNANCY UNCONFIRMED: ICD-10-CM

## 2023-03-10 DIAGNOSIS — R87.810 PAP SMEAR OF CERVIX SHOWS HIGH RISK HPV PRESENT: Primary | ICD-10-CM

## 2023-03-10 LAB — HCG UR QL: NEGATIVE

## 2023-03-10 PROCEDURE — 57454 BX/CURETT OF CERVIX W/SCOPE: CPT | Performed by: OBSTETRICS & GYNECOLOGY

## 2023-03-10 PROCEDURE — 88305 TISSUE EXAM BY PATHOLOGIST: CPT | Performed by: PATHOLOGY

## 2023-03-10 PROCEDURE — 81025 URINE PREGNANCY TEST: CPT | Performed by: OBSTETRICS & GYNECOLOGY

## 2023-03-10 NOTE — PATIENT INSTRUCTIONS

## 2023-03-10 NOTE — PROGRESS NOTES
GYN CLINIC VISIT  3/10/2023  CC: colposcopy    HPI:    Phoebe Alva is a 38 year old year old female  who presents for initial colposcopy, referred. Pap smear on 23 showed: normal and with high risk HPV present: OTHER. The prior pap showed normal and with high risk HPV present: OTHER.     Pap history as follows:  13 NIL  19 NIL pap, neg HPV  21 NIL pap, +HR HPV (not 16/18). Plan: cotest in 1 year  21 Pt notified   22 Reminder Mychart, Letter   22 Reminder call-lm  23 NIL pap, +HR HPV (not 16/18). Plan Lejunior due bef 23    Patient's last menstrual period was 2023 (exact date).  UPT today is negative  Patient does not smoke  Type of contraception: vasectomy  Age at first sexual intercourse: 22  Number of sexual partners (lifetime): 6+  Past GYN history: HPV  Prior cervical/vaginal disease: none.  Prior cervical treatment: no treatment.    Vitals:    03/10/23 1426   BP: 135/83   Pulse: 72   SpO2: 99%   Weight: 88 kg (194 lb)   Height: 1.829 m (6')       PROCEDURE:  Before the procedure, it was ensured that the patient was educated regarding the nature of her findings to date, the implications, and what was to be done. She has been made aware of the role of HPV, the natural history of infection, ways to minimize her future risk, the effect of HPV on the cervix, and treatment options available should they be indicated. The details of the colposcopic procedure were reviewed. All questions were answered before proceeding, and informed consent was therefore obtained.      Speculum placed in vagina and excellent visualization of cervix acheived, cervix swabbed x 3 with acetic acid solution.      FINDINGS:  Cervix: small area of acetowhitening noted at 12 o'clock  SCJ seen?: no   ECC done?: Yes  Satisfactory examination?: no  Representative biopsy taken at 12 o'clock. ECC performed with curette followed by cytobrush. Silver nitrate applied to achieve hemostasis.  Specimens labeled and sent to path. Patient tolerated procedure well.     ASSESSMENT: HPV related changes.  PLAN:  1. Pap smear of cervix shows high risk HPV present    - Surgical Pathology Exam  - COLP CERVIX/UPPER VAGINA W BX CERVIX/ENDOCERV CURETT    2. Pregnancy examination or test, pregnancy unconfirmed    - HCG qualitative urine; Future  - HCG qualitative urine     specimens labelled and sent to Pathology, will base further treatment on Pathology findings, treatment options discussed with patient, post biopsy instructions given to patient and call to discuss Pathology results if treatment indicated,plan to send [employer] message if no treatment indicated.     Gabrielle Vega MD

## 2023-03-14 LAB
PATH REPORT.COMMENTS IMP SPEC: NORMAL
PATH REPORT.COMMENTS IMP SPEC: NORMAL
PATH REPORT.FINAL DX SPEC: NORMAL
PATH REPORT.GROSS SPEC: NORMAL
PATH REPORT.MICROSCOPIC SPEC OTHER STN: NORMAL
PATH REPORT.RELEVANT HX SPEC: NORMAL
PHOTO IMAGE: NORMAL

## 2023-03-15 ENCOUNTER — PATIENT OUTREACH (OUTPATIENT)
Dept: FAMILY MEDICINE | Facility: CLINIC | Age: 38
End: 2023-03-15
Payer: COMMERCIAL

## 2023-10-07 ENCOUNTER — HEALTH MAINTENANCE LETTER (OUTPATIENT)
Age: 38
End: 2023-10-07

## 2024-02-20 SDOH — HEALTH STABILITY: PHYSICAL HEALTH: ON AVERAGE, HOW MANY DAYS PER WEEK DO YOU ENGAGE IN MODERATE TO STRENUOUS EXERCISE (LIKE A BRISK WALK)?: 4 DAYS

## 2024-02-20 SDOH — HEALTH STABILITY: PHYSICAL HEALTH: ON AVERAGE, HOW MANY MINUTES DO YOU ENGAGE IN EXERCISE AT THIS LEVEL?: 60 MIN

## 2024-02-20 ASSESSMENT — SOCIAL DETERMINANTS OF HEALTH (SDOH): HOW OFTEN DO YOU GET TOGETHER WITH FRIENDS OR RELATIVES?: MORE THAN THREE TIMES A WEEK

## 2024-02-22 ENCOUNTER — PATIENT OUTREACH (OUTPATIENT)
Dept: ONCOLOGY | Facility: CLINIC | Age: 39
End: 2024-02-22

## 2024-02-22 ENCOUNTER — OFFICE VISIT (OUTPATIENT)
Dept: FAMILY MEDICINE | Facility: CLINIC | Age: 39
End: 2024-02-22
Payer: COMMERCIAL

## 2024-02-22 VITALS
BODY MASS INDEX: 26.81 KG/M2 | RESPIRATION RATE: 20 BRPM | TEMPERATURE: 98 F | OXYGEN SATURATION: 100 % | HEIGHT: 72 IN | WEIGHT: 197.9 LBS | SYSTOLIC BLOOD PRESSURE: 130 MMHG | DIASTOLIC BLOOD PRESSURE: 82 MMHG | HEART RATE: 67 BPM

## 2024-02-22 DIAGNOSIS — Z12.4 CERVICAL CANCER SCREENING: ICD-10-CM

## 2024-02-22 DIAGNOSIS — R87.810 CERVICAL HIGH RISK HPV (HUMAN PAPILLOMAVIRUS) TEST POSITIVE: ICD-10-CM

## 2024-02-22 DIAGNOSIS — Z80.3 FAMILY HISTORY OF MALIGNANT NEOPLASM OF BREAST: ICD-10-CM

## 2024-02-22 DIAGNOSIS — Z00.00 ROUTINE GENERAL MEDICAL EXAMINATION AT A HEALTH CARE FACILITY: Primary | ICD-10-CM

## 2024-02-22 DIAGNOSIS — Z80.8 FAMILY HISTORY OF MELANOMA: ICD-10-CM

## 2024-02-22 DIAGNOSIS — M65.4 DE QUERVAIN'S DISEASE (TENOSYNOVITIS): ICD-10-CM

## 2024-02-22 PROCEDURE — 99213 OFFICE O/P EST LOW 20 MIN: CPT | Mod: 25 | Performed by: FAMILY MEDICINE

## 2024-02-22 PROCEDURE — 90686 IIV4 VACC NO PRSV 0.5 ML IM: CPT | Performed by: FAMILY MEDICINE

## 2024-02-22 PROCEDURE — 90651 9VHPV VACCINE 2/3 DOSE IM: CPT | Performed by: FAMILY MEDICINE

## 2024-02-22 PROCEDURE — 99395 PREV VISIT EST AGE 18-39: CPT | Mod: 25 | Performed by: FAMILY MEDICINE

## 2024-02-22 PROCEDURE — 90471 IMMUNIZATION ADMIN: CPT | Performed by: FAMILY MEDICINE

## 2024-02-22 PROCEDURE — 87624 HPV HI-RISK TYP POOLED RSLT: CPT | Performed by: FAMILY MEDICINE

## 2024-02-22 PROCEDURE — G0145 SCR C/V CYTO,THINLAYER,RESCR: HCPCS | Performed by: FAMILY MEDICINE

## 2024-02-22 PROCEDURE — 90472 IMMUNIZATION ADMIN EACH ADD: CPT | Performed by: FAMILY MEDICINE

## 2024-02-22 PROCEDURE — 90715 TDAP VACCINE 7 YRS/> IM: CPT | Performed by: FAMILY MEDICINE

## 2024-02-22 NOTE — PROGRESS NOTES
"Preventive Care Visit  [facility] CLARE Sharma MD, Family Medicine  Feb 22, 2024    Assessment & Plan     Routine general medical examination at a health care facility: HPV, Tdap, flu given.    Cervical cancer screening  Cervical high risk HPV (human papillomavirus) test positive: Pap with cotesting today. History of other high risk HPV on pap smear- she had subsequent colposcopy last year that was normal.  - Pap Screen with HPV - recommended age 30 - 65 years    Family history of melanoma: Refer to dermatology for annual skin checks- referred to [facility] dermatology.   - Adult Dermatology  Referral    Family history of malignant neoplasm of breast: Mother with history of breast cancer- declined breast exam today. She may qualify for genetic testing or increased surveillance/screening with mammograms/MRI's- refer to genetic counseling for further evaluation.   - Adult Oncology/Hematology  Referral    De Quervain's disease (tenosynovitis): History and exam consistent with de quervain's. Left sided. Positive finkelsteins test. Reviewed supportive cares including: NSAIDS, activity modifications/rest, using de quervain's brace, ice prn, and referral for therapy.   - Occupational Therapy  Referral    Hematoma: lower leg- no concern for fracture or other underlying injury- range of motion in ankle intact. Plan to continuing increasing her activities as tolerated- reviewed typical course of involution/resorption for hematoma.       BMI  Estimated body mass index is 26.66 kg/m  as calculated from the following:    Height as of this encounter: 1.835 m (6' 0.24\").    Weight as of this encounter: 89.8 kg (197 lb 14.4 oz).       Counseling  Appropriate preventive services were discussed with this patient, including applicable screening as appropriate for fall prevention, nutrition, physical activity, Tobacco-use cessation, weight loss and cognition.  Checklist reviewing " preventive services available has been given to the patient.  Reviewed patient's diet, addressing concerns and/or questions.   The patient was instructed to see the dentist every 6 months.       Kendra Sandhu is a 39 year old, presenting for the following:  Physical        2/22/2024     1:31 PM   Additional Questions   Roomed by george weaver   Accompanied by Self        Health Care Directive  Patient does not have a Health Care Directive or Living Will: Discussed advance care planning with patient; information given to patient to review.    HPI    1/10/24, RLE. She had mechanical fall and fell unto concrete hitting her left lower leg. She had significant bruising, but was able to bear weight at the time. Pain has improved significantly but still has slight area of discoloration and bump on right lower leg. She has been exercising without difficulty but has not yet returning to jogging/running.    Pain on left wrist area. Started about 1 month ago. No known injury.         2/20/2024   General Health   How would you rate your overall physical health? Excellent   Feel stress (tense, anxious, or unable to sleep) Rather much   (!) STRESS CONCERN      2/20/2024   Nutrition   Three or more servings of calcium each day? Yes   Diet: Regular (no restrictions)   How many servings of fruit and vegetables per day? (!) 2-3   How many sweetened beverages each day? 0-1         2/20/2024   Exercise   Days per week of moderate/strenous exercise 4 days   Average minutes spent exercising at this level 60 min         2/20/2024   Social Factors   Frequency of gathering with friends or relatives More than three times a week   Worry food won't last until get money to buy more No   Food not last or not have enough money for food? No   Do you have housing?  Yes   Are you worried about losing your housing? No   Lack of transportation? No   Unable to get utilities (heat,electricity)? No         2/20/2024   Dental   Dentist two times every  year? (!) NO         2/20/2024   TB Screening   Were you born outside of [place]?  No         Today's PHQ-2 Score:       2/22/2024     1:26 PM   PHQ-2 ( 1999 [employer])   Q1: Little interest or pleasure in doing things 0   Q2: Feeling down, depressed or hopeless 0   PHQ-2 Score 0   Q1: Little interest or pleasure in doing things Not at all   Q2: Feeling down, depressed or hopeless Not at all   PHQ-2 Score 0           2/20/2024   Substance Use   Alcohol more than 3/day or more than 7/wk No   Do you use any other substances recreationally? (!) ALCOHOL    (!) CANNABIS PRODUCTS     Social History     Tobacco Use    Smoking status: Never     Passive exposure: Never    Smokeless tobacco: Never   Vaping Use    Vaping Use: Never used   Substance Use Topics    Alcohol use: Yes     Alcohol/week: 1.0 standard drink of alcohol     Types: 1 Glasses of wine per week     Comment: 1 glass wine, 5 days of week    Drug use: Never                    2/20/2024   One time HIV Screening   Previous HIV test? No         2/20/2024   STI Screening   New sexual partner(s) since last STI/HIV test? No     History of abnormal Pap smear: yes +other high risk HPV with normal colposcopy last year        Latest Ref Rng & Units 2/8/2023     8:43 AM 11/29/2021    11:26 AM 11/18/2019     6:27 PM   PAP / HPV   PAP  Negative for Intraepithelial Lesion or Malignancy (NILM)  Negative for Intraepithelial Lesion or Malignancy (NILM)  Negative for squamous intraepithelial lesion or malignancy  Electronically signed by Fadumo Davis CT (ASCP) on 12/2/2019 at 10:01 AM      HPV 16 DNA Negative Negative  Negative  Negative    HPV 18 DNA Negative Negative  Negative  Negative    Other HR HPV Negative Positive  Positive  Negative            2/20/2024   Contraception/Family Planning   Questions about contraception or family planning No        Reviewed and updated as needed this visit by Provider                       Objective    Exam  /82 (BP Location: Left  "arm, Patient Position: Sitting, Cuff Size: Adult Regular)   Pulse 67   Temp 98  F (36.7  C) (Oral)   Resp 20   Ht 1.835 m (6' 0.24\")   Wt 89.8 kg (197 lb 14.4 oz)   LMP 02/15/2024 (Exact Date)   SpO2 100%   BMI 26.66 kg/m     Estimated body mass index is 26.66 kg/m  as calculated from the following:    Height as of this encounter: 1.835 m (6' 0.24\").    Weight as of this encounter: 89.8 kg (197 lb 14.4 oz).    Physical Exam  General: Alert and oriented, in NAD.  Eyes: PERRL, EOMI, sclera normal.  HENT: Normocephalic, no pharyngeal erythema, MMM.  Neck: Supple, no adenopathy.  Breast: declines  Heart: Normal S1 and S2, regular rhythm. No murmurs, gallops, rubs.  Lungs: CTA bilaterally, no wheezes, no crackles, no rhonchi.  MSK: Ankle with normal range of motion and strength. Left wrist with +finkelstein test.  Neuro: Alert and oriented x 3. Moves all extremities. No focal deficits noted.  Extremities: Peripheral pulses intact, no peripheral edema.  Skin: Small area of induration on anterior lower extremity  Psych: Normal mood and affect.        Signed Electronically by: Raven Sharma MD    "

## 2024-02-22 NOTE — PATIENT INSTRUCTIONS
Call [facility] [place] to schedule appointment    Try ibuprofen 600 mg three times daily (with food) for 7-10 days  Ice as needed  Use a De [employer]ervain's wrist brace     Preventive Care Advice   This is general advice given by our system to help you stay healthy. However, your care team may have specific advice just for you. Please talk to your care team about your preventive care needs.  Nutrition  Eat 5 or more servings of fruits and vegetables each day.  Try wheat bread, brown rice and whole grain pasta (instead of white bread, rice, and pasta).  Get enough calcium and vitamin D. Check the label on foods and aim for 100% of the RDA (recommended daily allowance).  Lifestyle  Exercise at least 150 minutes each week  (30 minutes a day, 5 days a week).  Do muscle strengthening activities 2 days a week. These help control your weight and prevent disease.  No smoking.  Wear sunscreen to prevent skin cancer.  Have a dental exam and cleaning every 6 months.  Yearly exams  See your health care team every year to talk about:  Any changes in your health.  Any medicines your care team has prescribed.  Preventive care, family planning, and ways to prevent chronic diseases.  Shots (vaccines)   HPV shots (up to age 26), if you've never had them before.  Hepatitis B shots (up to age 59), if you've never had them before.  COVID-19 shot: Get this shot when it's due.  Flu shot: Get a flu shot every year.  Tetanus shot: Get a tetanus shot every 10 years.  Pneumococcal, hepatitis A, and RSV shots: Ask your care team if you need these based on your risk.  Shingles shot (for age 50 and up)  General health tests  Diabetes screening:  Starting at age 35, Get screened for diabetes at least every 3 years.  If you are younger than age 35, ask your care team if you should be screened for diabetes.  Cholesterol test: At age 39, start having a cholesterol test every 5 years, or more often if advised.  Bone density scan (DEXA): At  age 50, ask your care team if you should have this scan for osteoporosis (brittle bones).  Hepatitis C: Get tested at least once in your life.  STIs (sexually transmitted infections)  Before age 24: Ask your care team if you should be screened for STIs.  After age 24: Get screened for STIs if you're at risk. You are at risk for STIs (including HIV) if:  You are sexually active with more than one person.  You don't use condoms every time.  You or a partner was diagnosed with a sexually transmitted infection.  If you are at risk for HIV, ask about PrEP medicine to prevent HIV.  Get tested for HIV at least once in your life, whether you are at risk for HIV or not.  Cancer screening tests  Cervical cancer screening: If you have a cervix, begin getting regular cervical cancer screening tests starting at age 21.  Breast cancer scan (mammogram): If you've ever had breasts, begin having regular mammograms starting at age 40. This is a scan to check for breast cancer.  Colon cancer screening: It is important to start screening for colon cancer at age 45.  Have a colonoscopy test every 10 years (or more often if you're at risk) Or, ask your provider about stool tests like a FIT test every year or Cologuard test every 3 years.  To learn more about your testing options, visit:   https://www.babberly/890041.pdf.  For help making a decision, visit:   https://bit.ly/nt56888.  Prostate cancer screening test: If you have a prostate, ask your care team if a prostate cancer screening test (PSA) at age 55 is right for you.  Lung cancer screening: If you are a current or former smoker ages 50 to 80, ask your care team if ongoing lung cancer screenings are right for you.  For informational purposes only. Not to replace the advice of your health care provider. Copyright   2023 [employer]. All rights reserved. Clinically reviewed by the [facility] Transitions Program. [employer] [id number] - REV 01/24.    Learning About  Stress  What is stress?     Stress is your body's response to a hard situation. Your body can have a physical, emotional, or mental response. Stress is a fact of life for most people, and it affects everyone differently. What causes stress for you may not be stressful for someone else.  A lot of things can cause stress. You may feel stress when you go on a job interview, take a test, or run a race. This kind of short-term stress is normal and even useful. It can help you if you need to work hard or react quickly. For example, stress can help you finish an important job on time.  Long-term stress is caused by ongoing stressful situations or events. Examples of long-term stress include long-term health problems, ongoing problems at work, or conflicts in your family. Long-term stress can harm your health.  How does stress affect your health?  When you are stressed, your body responds as though you are in danger. It makes hormones that speed up your heart, make you breathe faster, and give you a burst of energy. This is called the fight-or-flight stress response. If the stress is over quickly, your body goes back to normal and no harm is done.  But if stress happens too often or lasts too long, it can have bad effects. Long-term stress can make you more likely to get sick, and it can make symptoms of some diseases worse. If you tense up when you are stressed, you may develop neck, shoulder, or low back pain. Stress is linked to high blood pressure and heart disease.  Stress also harms your emotional health. It can make you yoon, tense, or depressed. Your relationships may suffer, and you may not do well at work or school.  What can you do to manage stress?  You can try these things to help manage stress:   Do something active. Exercise or activity can help reduce stress. Walking is a great way to get started. Even everyday activities such as housecleaning or yard work can help.  Try yoga or kim chi. These techniques  combine exercise and meditation. You may need some training at first to learn them.  Do something you enjoy. For example, listen to music or go to a movie. Practice your hobby or do volunteer work.  Meditate. This can help you relax, because you are not worrying about what happened before or what may happen in the future.  Do guided imagery. Imagine yourself in any setting that helps you feel calm. You can use online videos, books, or a teacher to guide you.  Do breathing exercises. For example:  From a standing position, bend forward from the waist with your knees slightly bent. Let your arms dangle close to the floor.  Breathe in slowly and deeply as you return to a standing position. Roll up slowly and lift your head last.  Hold your breath for just a few seconds in the standing position.  Breathe out slowly and bend forward from the waist.  Let your feelings out. Talk, laugh, cry, and express anger when you need to. Talking with supportive friends or family, a counselor, or a radha leader about your feelings is a healthy way to relieve stress. Avoid discussing your feelings with people who make you feel worse.  Write. It may help to write about things that are bothering you. This helps you find out how much stress you feel and what is causing it. When you know this, you can find better ways to cope.  What can you do to prevent stress?  You might try some of these things to help prevent stress:  Manage your time. This helps you find time to do the things you want and need to do.  Get enough sleep. Your body recovers from the stresses of the day while you are sleeping.  Get support. Your family, friends, and community can make a difference in how you experience stress.  Limit your news feed. Avoid or limit time on social media or news that may make you feel stressed.  Do something active. Exercise or activity can help reduce stress. Walking is a great way to get started.  Where can you learn more?  Go to  "https://www.[employer].net/patiented  Enter N032 in the search box to learn more about \"Learning About Stress.\"  Current as of: February 26, 2023               Content Version: 13.8    6028-8780 [employer].   Care instructions adapted under license by your healthcare professional. If you have questions about a medical condition or this instruction, always ask your healthcare professional. [employer]wise[employer] ki work disclaims any warranty or liability for your use of this information.      "

## 2024-02-22 NOTE — PROGRESS NOTES
Writer received Cancer Risk Management Program referral, referred for:      Family history of melanoma  Family history of malignant neoplasm of breast        Reviewed for appropriate plan, and sent to New Patient Scheduling for completion.

## 2024-02-27 LAB
BKR LAB AP GYN ADEQUACY: NORMAL
BKR LAB AP GYN INTERPRETATION: NORMAL
BKR LAB AP HPV REFLEX: NORMAL
BKR LAB AP LMP: NORMAL
BKR LAB AP PREVIOUS ABNORMAL: NORMAL
PATH REPORT.COMMENTS IMP SPEC: NORMAL
PATH REPORT.COMMENTS IMP SPEC: NORMAL
PATH REPORT.RELEVANT HX SPEC: NORMAL

## 2024-02-29 LAB
HUMAN PAPILLOMA VIRUS 16 DNA: NEGATIVE
HUMAN PAPILLOMA VIRUS 18 DNA: NEGATIVE
HUMAN PAPILLOMA VIRUS FINAL DIAGNOSIS: NORMAL
HUMAN PAPILLOMA VIRUS OTHER HR: NEGATIVE

## 2025-01-09 ENCOUNTER — OFFICE VISIT (OUTPATIENT)
Dept: FAMILY MEDICINE | Facility: CLINIC | Age: 40
End: 2025-01-09
Attending: FAMILY MEDICINE
Payer: COMMERCIAL

## 2025-01-09 ENCOUNTER — E-VISIT (OUTPATIENT)
Dept: FAMILY MEDICINE | Facility: CLINIC | Age: 40
End: 2025-01-09
Payer: COMMERCIAL

## 2025-01-09 VITALS
OXYGEN SATURATION: 98 % | HEIGHT: 72 IN | WEIGHT: 200 LBS | SYSTOLIC BLOOD PRESSURE: 120 MMHG | BODY MASS INDEX: 27.09 KG/M2 | RESPIRATION RATE: 16 BRPM | TEMPERATURE: 98 F | HEART RATE: 77 BPM | DIASTOLIC BLOOD PRESSURE: 85 MMHG

## 2025-01-09 DIAGNOSIS — R69 DIAGNOSIS UNKNOWN: Primary | ICD-10-CM

## 2025-01-09 DIAGNOSIS — R10.32 LEFT LOWER QUADRANT PAIN: Primary | ICD-10-CM

## 2025-01-09 LAB
BASOPHILS # BLD AUTO: 0 10E3/UL (ref 0–0.2)
BASOPHILS NFR BLD AUTO: 0 %
EOSINOPHIL # BLD AUTO: 0.1 10E3/UL (ref 0–0.7)
EOSINOPHIL NFR BLD AUTO: 1 %
ERYTHROCYTE [DISTWIDTH] IN BLOOD BY AUTOMATED COUNT: 12.1 % (ref 10–15)
HCT VFR BLD AUTO: 40.4 % (ref 35–47)
HGB BLD-MCNC: 13.4 G/DL (ref 11.7–15.7)
IMM GRANULOCYTES # BLD: 0 10E3/UL
IMM GRANULOCYTES NFR BLD: 0 %
LYMPHOCYTES # BLD AUTO: 1.4 10E3/UL (ref 0.8–5.3)
LYMPHOCYTES NFR BLD AUTO: 12 %
MCH RBC QN AUTO: 28.6 PG (ref 26.5–33)
MCHC RBC AUTO-ENTMCNC: 33.2 G/DL (ref 31.5–36.5)
MCV RBC AUTO: 86 FL (ref 78–100)
MONOCYTES # BLD AUTO: 0.5 10E3/UL (ref 0–1.3)
MONOCYTES NFR BLD AUTO: 4 %
NEUTROPHILS # BLD AUTO: 9.4 10E3/UL (ref 1.6–8.3)
NEUTROPHILS NFR BLD AUTO: 83 %
PLATELET # BLD AUTO: 222 10E3/UL (ref 150–450)
RBC # BLD AUTO: 4.69 10E6/UL (ref 3.8–5.2)
WBC # BLD AUTO: 11.4 10E3/UL (ref 4–11)

## 2025-01-09 NOTE — PATIENT INSTRUCTIONS
Dear Phoebe,    We are sorry you are not feeling well. Based on the responses you provided, it is recommended that you be seen in-person in clinic so we can better evaluate your symptoms. I am concerned about the abdominal pain combined with black stools. Please schedule this visit in reportbraint. You will have a Schedule Now button in [employer] to help with scheduling this appointment. Otherwise, you can call [phone] to schedule an appointment. If there are no appointments, I would recommend going to walk in clinic or urgent care.    You will not be charged for this eVisit. Thank you for trusting us with your care.     Raven Sharma MD

## 2025-01-09 NOTE — PROGRESS NOTES
Assessment & Plan     Left lower quadrant pain  Agree with patient's suspicion that the main consideration in this case would be diverticulitis given left lower quadrant pain, subjective fevers, and stool changes.  However there are other possibilities including colon mass, irritable bowel disease, infectious etiology, etc.  For now, we will put her on liquid diet and check labs as below and CT scan within 24 hours.  Urgent evaluation is needed for the possibility of severe life-threatening illness such as severe infection, severe blood loss anemia, etc.  If CT scan does confirm diverticulitis, would be reasonable at this point a week out from symptoms to try antibiotics versus give longer liquid diet chance.  Will discuss with her via MyChart or telephone once results become available.  In the meantime, if she gets persistent pain that is worsening or will not resolve, or other worsening symptoms in general, she would present to the emergency room.  - CT Abdomen Pelvis w Contrast  - Comprehensive metabolic panel (BMP + Alb, Alk Phos, ALT, AST, Total. Bili, TP)  - CBC with platelets and differential  - CRP, inflammation            Subjective   Phoebe is a 40 year old, presenting for the following health issues:  Abdominal Pain (Has been having abdominal pain x1 week, went away and flared up yesterday. Has been having very dark stools/)        1/9/2025     2:43 PM   Additional Questions   Roomed by Sonja ROD     History of Present Illness       Reason for visit:  Abdominal pain and dark stool  Symptom onset:  3-7 days ago   She is taking medications regularly.      Horrible abd pain 1/1/25. Piercing.  LLQ  She suspected diverticulitis and pushed fluids, ate easily digestable food for a few days.  Subdued the pain.  It can gone back to eating normally, but then pain recurred yesterday, not quite as bad.  Hard and painful.      Today stool was dark red, which is what prompted her to make contact with the clinic,  "initially through an e-visit.  Was then told to be seen in person, which brings her here now.  Has had 1 bowel movement since that dark red 1 that was more of a green.  No pain now, but bloated.  Possibly feverish in last several days, although that is better today.  Very tired.    Took ibupfoen, then stopped  Took tylenol and it helped.  No meds today.    LMP yesterday    No pain at the moment, but will be kind of crampy feeling like being constipated.    Never had anything like this before  No family history of colon cancer, inflammatory bowel disease, or other GI illness.                       Objective    /85   Pulse 77   Temp 98  F (36.7  C) (Oral)   Resp 16   Ht 1.835 m (6' 0.24\")   Wt 90.7 kg (200 lb)   LMP 01/09/2025 (Exact Date)   SpO2 98%   BMI 26.94 kg/m    Body mass index is 26.94 kg/m .  Physical Exam     Gen:  A&A, NAD.  Appears comfortable currently.  CV:  HRRR, no M/R/G  Resp:  CTAB  Abd: Soft without any guarding.  There is left lower quadrant tenderness.  There is some rebound.  Ext:  W&D, no edema            Signed Electronically by: Ashley Sandy MD    "

## 2025-01-10 ENCOUNTER — ANCILLARY PROCEDURE (OUTPATIENT)
Dept: CT IMAGING | Facility: CLINIC | Age: 40
End: 2025-01-10
Attending: FAMILY MEDICINE
Payer: COMMERCIAL

## 2025-01-10 DIAGNOSIS — R10.32 LEFT LOWER QUADRANT PAIN: ICD-10-CM

## 2025-01-10 PROCEDURE — 74177 CT ABD & PELVIS W/CONTRAST: CPT | Mod: GC | Performed by: STUDENT IN AN ORGANIZED HEALTH CARE EDUCATION/TRAINING PROGRAM

## 2025-01-10 RX ORDER — IOPAMIDOL 755 MG/ML
98 INJECTION, SOLUTION INTRAVASCULAR ONCE
Status: COMPLETED | OUTPATIENT
Start: 2025-01-10 | End: 2025-01-10

## 2025-01-10 RX ADMIN — IOPAMIDOL 98 ML: 755 INJECTION, SOLUTION INTRAVASCULAR at 15:26

## 2025-01-10 NOTE — DISCHARGE INSTRUCTIONS

## 2025-01-11 DIAGNOSIS — R10.32 LEFT LOWER QUADRANT PAIN: Primary | ICD-10-CM

## 2025-01-11 DIAGNOSIS — K92.1 MELENA: ICD-10-CM

## 2025-02-05 ENCOUNTER — TRANSFERRED RECORDS (OUTPATIENT)
Dept: HEALTH INFORMATION MANAGEMENT | Facility: CLINIC | Age: 40
End: 2025-02-05
Payer: COMMERCIAL

## 2025-02-24 ENCOUNTER — PATIENT OUTREACH (OUTPATIENT)
Dept: ONCOLOGY | Facility: CLINIC | Age: 40
End: 2025-02-24

## 2025-02-24 ENCOUNTER — OFFICE VISIT (OUTPATIENT)
Dept: FAMILY MEDICINE | Facility: CLINIC | Age: 40
End: 2025-02-24
Attending: FAMILY MEDICINE
Payer: COMMERCIAL

## 2025-02-24 VITALS
RESPIRATION RATE: 16 BRPM | TEMPERATURE: 98.1 F | HEART RATE: 76 BPM | WEIGHT: 194.06 LBS | BODY MASS INDEX: 26.28 KG/M2 | SYSTOLIC BLOOD PRESSURE: 108 MMHG | DIASTOLIC BLOOD PRESSURE: 72 MMHG | OXYGEN SATURATION: 98 % | HEIGHT: 72 IN

## 2025-02-24 DIAGNOSIS — Z00.00 ROUTINE GENERAL MEDICAL EXAMINATION AT A HEALTH CARE FACILITY: Primary | ICD-10-CM

## 2025-02-24 DIAGNOSIS — Z80.41 FAMILY HISTORY OF MALIGNANT NEOPLASM OF OVARY: ICD-10-CM

## 2025-02-24 DIAGNOSIS — R10.2 PELVIC PAIN IN FEMALE: ICD-10-CM

## 2025-02-24 DIAGNOSIS — Z80.3 FAMILY HISTORY OF MALIGNANT NEOPLASM OF BREAST: ICD-10-CM

## 2025-02-24 PROCEDURE — G2211 COMPLEX E/M VISIT ADD ON: HCPCS | Performed by: FAMILY MEDICINE

## 2025-02-24 PROCEDURE — 99395 PREV VISIT EST AGE 18-39: CPT | Performed by: FAMILY MEDICINE

## 2025-02-24 PROCEDURE — 99214 OFFICE O/P EST MOD 30 MIN: CPT | Mod: 25 | Performed by: FAMILY MEDICINE

## 2025-02-24 RX ORDER — DICYCLOMINE HYDROCHLORIDE 10 MG/1
10 CAPSULE ORAL 3 TIMES DAILY
COMMUNITY
Start: 2025-02-06

## 2025-02-24 SDOH — HEALTH STABILITY: PHYSICAL HEALTH: ON AVERAGE, HOW MANY DAYS PER WEEK DO YOU ENGAGE IN MODERATE TO STRENUOUS EXERCISE (LIKE A BRISK WALK)?: 4 DAYS

## 2025-02-24 SDOH — HEALTH STABILITY: PHYSICAL HEALTH: ON AVERAGE, HOW MANY MINUTES DO YOU ENGAGE IN EXERCISE AT THIS LEVEL?: 40 MIN

## 2025-02-24 ASSESSMENT — SOCIAL DETERMINANTS OF HEALTH (SDOH): HOW OFTEN DO YOU GET TOGETHER WITH FRIENDS OR RELATIVES?: THREE TIMES A WEEK

## 2025-02-24 NOTE — PROGRESS NOTES
New Patient Oncology Nurse Navigator Note     Referring provider: Raven Sharma MD      Referring Clinic/Organization: [facility]      Referred to (specialty:) Genetic Counseling and Cancer Risk Management     Requested provider (if applicable): NA     Date Referral Received: February 24, 2025     Evaluation for:  Family history of stage 4 ovarian cancer, breast cancer   Z80.3 (ICD-10-CM) - Family history of malignant neoplasm of breast   Z80.41 (ICD-10-CM) - Family history of malignant neoplasm of ovary     Payor: MEDICA / Plan: MEDICA CHOICE / Product Type: Indemnity /     February 24, 2025  Referral received and reviewed.   Sent to NPS to schedule.     Shamika LOVEN, RN, OCN  Oncology Nurse Navigator   [facility]  Cancer Care Service Line   New Patient Hem/Onc Scheduling / Referrals: [phone] (fax: [phone] )

## 2025-02-24 NOTE — PATIENT INSTRUCTIONS
Call [phone] to schedule pelvic US      Patient Education   Preventive Care Advice   This is general advice given by our system to help you stay healthy. However, your care team may have specific advice just for you. Please talk to your care team about your preventive care needs.  Nutrition  Eat 5 or more servings of fruits and vegetables each day.  Try wheat bread, brown rice and whole grain pasta (instead of white bread, rice, and pasta).  Get enough calcium and vitamin D. Check the label on foods and aim for 100% of the RDA (recommended daily allowance).  Lifestyle  Exercise at least 150 minutes each week  (30 minutes a day, 5 days a week).  Do muscle strengthening activities 2 days a week. These help control your weight and prevent disease.  No smoking.  Wear sunscreen to prevent skin cancer.  Have a dental exam and cleaning every 6 months.  Yearly exams  See your health care team every year to talk about:  Any changes in your health.  Any medicines your care team has prescribed.  Preventive care, family planning, and ways to prevent chronic diseases.  Shots (vaccines)   HPV shots (up to age 26), if you've never had them before.  Hepatitis B shots (up to age 59), if you've never had them before.  COVID-19 shot: Get this shot when it's due.  Flu shot: Get a flu shot every year.  Tetanus shot: Get a tetanus shot every 10 years.  Pneumococcal, hepatitis A, and RSV shots: Ask your care team if you need these based on your risk.  Shingles shot (for age 50 and up)  General health tests  Diabetes screening:  Starting at age 35, Get screened for diabetes at least every 3 years.  If you are younger than age 35, ask your care team if you should be screened for diabetes.  Cholesterol test: At age 39, start having a cholesterol test every 5 years, or more often if advised.  Bone density scan (DEXA): At age 50, ask your care team if you should have this scan for osteoporosis (brittle bones).  Hepatitis C: Get tested at  least once in your life.  STIs (sexually transmitted infections)  Before age 24: Ask your care team if you should be screened for STIs.  After age 24: Get screened for STIs if you're at risk. You are at risk for STIs (including HIV) if:  You are sexually active with more than one person.  You don't use condoms every time.  You or a partner was diagnosed with a sexually transmitted infection.  If you are at risk for HIV, ask about PrEP medicine to prevent HIV.  Get tested for HIV at least once in your life, whether you are at risk for HIV or not.  Cancer screening tests  Cervical cancer screening: If you have a cervix, begin getting regular cervical cancer screening tests starting at age 21.  Breast cancer scan (mammogram): If you've ever had breasts, begin having regular mammograms starting at age 40. This is a scan to check for breast cancer.  Colon cancer screening: It is important to start screening for colon cancer at age 45.  Have a colonoscopy test every 10 years (or more often if you're at risk) Or, ask your provider about stool tests like a FIT test every year or Cologuard test every 3 years.  To learn more about your testing options, visit:   .  For help making a decision, visit:   https://bit.ly/ka22228.  Prostate cancer screening test: If you have a prostate, ask your care team if a prostate cancer screening test (PSA) at age 55 is right for you.  Lung cancer screening: If you are a current or former smoker ages 50 to 80, ask your care team if ongoing lung cancer screenings are right for you.  For informational purposes only. Not to replace the advice of your health care provider. Copyright   2023 [facility]. All rights reserved. Clinically reviewed by the [facility] Transitions Program. [employer] [id number] - REV 01/24.  Learning About Stress  What is stress?     Stress is your body's response to a hard situation. Your body can have a physical, emotional, or mental response. Stress is a  fact of life for most people, and it affects everyone differently. What causes stress for you may not be stressful for someone else.  A lot of things can cause stress. You may feel stress when you go on a job interview, take a test, or run a race. This kind of short-term stress is normal and even useful. It can help you if you need to work hard or react quickly. For example, stress can help you finish an important job on time.  Long-term stress is caused by ongoing stressful situations or events. Examples of long-term stress include long-term health problems, ongoing problems at work, or conflicts in your family. Long-term stress can harm your health.  How does stress affect your health?  When you are stressed, your body responds as though you are in danger. It makes hormones that speed up your heart, make you breathe faster, and give you a burst of energy. This is called the fight-or-flight stress response. If the stress is over quickly, your body goes back to normal and no harm is done.  But if stress happens too often or lasts too long, it can have bad effects. Long-term stress can make you more likely to get sick, and it can make symptoms of some diseases worse. If you tense up when you are stressed, you may develop neck, shoulder, or low back pain. Stress is linked to high blood pressure and heart disease.  Stress also harms your emotional health. It can make you yoon, tense, or depressed. Your relationships may suffer, and you may not do well at work or school.  What can you do to manage stress?  You can try these things to help manage stress:   Do something active. Exercise or activity can help reduce stress. Walking is a great way to get started. Even everyday activities such as housecleaning or yard work can help.  Try yoga or kim chi. These techniques combine exercise and meditation. You may need some training at first to learn them.  Do something you enjoy. For example, listen to music or go to a movie.  "Practice your hobby or do volunteer work.  Meditate. This can help you relax, because you are not worrying about what happened before or what may happen in the future.  Do guided imagery. Imagine yourself in any setting that helps you feel calm. You can use online videos, books, or a teacher to guide you.  Do breathing exercises. For example:  From a standing position, bend forward from the waist with your knees slightly bent. Let your arms dangle close to the floor.  Breathe in slowly and deeply as you return to a standing position. Roll up slowly and lift your head last.  Hold your breath for just a few seconds in the standing position.  Breathe out slowly and bend forward from the waist.  Let your feelings out. Talk, laugh, cry, and express anger when you need to. Talking with supportive friends or family, a counselor, or a radha leader about your feelings is a healthy way to relieve stress. Avoid discussing your feelings with people who make you feel worse.  Write. It may help to write about things that are bothering you. This helps you find out how much stress you feel and what is causing it. When you know this, you can find better ways to cope.  What can you do to prevent stress?  You might try some of these things to help prevent stress:  Manage your time. This helps you find time to do the things you want and need to do.  Get enough sleep. Your body recovers from the stresses of the day while you are sleeping.  Get support. Your family, friends, and community can make a difference in how you experience stress.  Limit your news feed. Avoid or limit time on social media or news that may make you feel stressed.  Do something active. Exercise or activity can help reduce stress. Walking is a great way to get started.  Where can you learn more?  Go to https://www.healthwise.net/patiented  Enter N032 in the search box to learn more about \"Learning About Stress.\"  Current as of: October 24, 2023  Content Version: " 14.3    2024 [email or web address].   Care instructions adapted under license by your healthcare professional. If you have questions about a medical condition or this instruction, always ask your healthcare professional. [employer] disclaims any warranty or liability for your use of this information.

## 2025-02-24 NOTE — PROGRESS NOTES
Preventive Care Visit  [facility] CLARE Sharma MD, Family Medicine  Feb 24, 2025      Assessment & Plan     Routine general medical examination at a health care facility    Family history of malignant neoplasm of breast: Refer for genetic counseling.   - Adult Oncology/Hematology  Referral    Family history of malignant neoplasm of ovary: Refer for genetic counseling.  - Adult Oncology/Hematology  Referral    Pelvic pain in female/LLQ pain: Concern for possible diverticulitis, however CT abdomen/pelvis 1/10/25 showed mild splenomegaly and nonspecific enlarged mesenteric lymph nodes, liver hemangiomas, and left adnexal hypodensity, but no diverticulitis, although this was after her symptoms had resolved. Labwork was unremarkable except mild leukocytosis. Plan for pelvic US for further workup. Patient continues to have intermittent flare of symptoms- she has seen [facility] and prescribed dicyclomine for IBS and has follow-up in a few months.   - US Pelvic Complete with Transvaginal                BMI  Estimated body mass index is 26.32 kg/m  as calculated from the following:    Height as of this encounter: 1.829 m (6').    Weight as of this encounter: 88 kg (194 lb 1 oz).       Counseling  Appropriate preventive services were addressed with this patient via screening, questionnaire, or discussion as appropriate for fall prevention, nutrition, physical activity, Tobacco-use cessation, social engagement, weight loss and cognition.  Checklist reviewing preventive services available has been given to the patient.  Reviewed patient's diet, addressing concerns and/or questions.   The patient was instructed to see the dentist every 6 months.   She is at risk for psychosocial distress and has been provided with information to reduce risk.           Kendra Sandhu is a 40 year old, presenting for the following:  Physical (Pt wants to discuss about fallopian tube removal -  mom was  recently diagnosed with stage 4 ovarian cancer in march 2024. ) and GI Problem (Start in January )        2/24/2025     8:39 AM   Additional Questions   Roomed by Ildefonso DELAROSA   Accompanied by Self          GI Problem    Left lower quadrant and middle pelvic pain  Denies urinary symptoms  Denies vaginal discharge, odor  Severe pain- when she has flares  Previously followed a high fiber diet  She was on low residue/fiber diet initially after her first episode, but now is back on high fiber diet as recommended by GI  Still having residual symptoms with intermittent flares with severe pain  [facility]- started on dicyclomine for presumed IBS  Mother- diagnosed stage 4 ovarian cancer- March 2024, also history of stage 1 breast cancer  Partner- hx of vasectomy- does not desire future fertility  Interested in discussing removal of tubes/tubal ligation    Health Care Directive  Patient does not have a Health Care Directive: not on file      2/24/2025   General Health   How would you rate your overall physical health? Good   Feel stress (tense, anxious, or unable to sleep) To some extent   (!) STRESS CONCERN      2/24/2025   Nutrition   Three or more servings of calcium each day? Yes   Diet: Regular (no restrictions)   How many servings of fruit and vegetables per day? 4 or more   How many sweetened beverages each day? 0-1         2/24/2025   Exercise   Days per week of moderate/strenous exercise 4 days   Average minutes spent exercising at this level 40 min         2/24/2025   Social Factors   Frequency of gathering with friends or relatives Three times a week   Worry food won't last until get money to buy more No   Food not last or not have enough money for food? No   Do you have housing? (Housing is defined as stable permanent housing and does not include staying ouside in a car, in a tent, in an abandoned building, in an overnight shelter, or couch-surfing.) Yes   Are you worried about losing your housing? No   Lack of  transportation? No   Unable to get utilities (heat,electricity)? No         2/24/2025   Dental   Dentist two times every year? (!) NO         2/20/2024   TB Screening   Were you born outside of the [place]? No           Today's PHQ-2 Score:       1/9/2025     2:40 PM   PHQ-2 ( 1999 [employer])   Q1: Little interest or pleasure in doing things 0   Q2: Feeling down, depressed or hopeless 0   PHQ-2 Score 0    Q1: Little interest or pleasure in doing things Not at all   Q2: Feeling down, depressed or hopeless Not at all   PHQ-2 Score 0       Patient-reported         2/24/2025   Substance Use   Alcohol more than 3/day or more than 7/wk No   Do you use any other substances recreationally? (!) CANNABIS PRODUCTS     Social History     Tobacco Use    Smoking status: Never     Passive exposure: Never    Smokeless tobacco: Never   Vaping Use    Vaping status: Never Used   Substance Use Topics    Alcohol use: Yes     Alcohol/week: 1.0 standard drink of alcohol     Types: 1 Glasses of wine per week     Comment: 1 glass wine, 5 days of week    Drug use: Never                    2/24/2025   One time HIV Screening   Previous HIV test? No         2/24/2025   STI Screening   New sexual partner(s) since last STI/HIV test? No     History of abnormal Pap smear:         Latest Ref Rng & Units 2/22/2024     1:51 PM 2/8/2023     8:43 AM 11/29/2021    11:26 AM   PAP / HPV   PAP  Negative for Intraepithelial Lesion or Malignancy (NILM)  Negative for Intraepithelial Lesion or Malignancy (NILM)  Negative for Intraepithelial Lesion or Malignancy (NILM)    HPV 16 DNA Negative Negative  Negative  Negative    HPV 18 DNA Negative Negative  Negative  Negative    Other HR HPV Negative Negative  Positive  Positive            2/24/2025   Contraception/Family Planning   Questions about contraception or family planning No        Reviewed and updated as needed this visit by Provider                             Objective    Exam  /72 (BP Location: Left arm,  Patient Position: Sitting, Cuff Size: Adult Regular)   Pulse 76   Temp 98.1  F (36.7  C) (Oral)   Resp 16   Ht 1.829 m (6')   Wt 88 kg (194 lb 1 oz)   LMP 02/03/2025 (Exact Date)   SpO2 98%   BMI 26.32 kg/m     Estimated body mass index is 26.32 kg/m  as calculated from the following:    Height as of this encounter: 1.829 m (6').    Weight as of this encounter: 88 kg (194 lb 1 oz).    Physical Exam  GENERAL: alert and no distress  EYES: Eyes grossly normal to inspection, PERRL and conjunctivae and sclerae normal  HENT: ear canals and TM's normal, nose and mouth without ulcers or lesions  NECK: no adenopathy, no asymmetry, masses, or scars  RESP: lungs clear to auscultation - no rales, rhonchi or wheezes  BREAST: declined by patient  CV: regular rate and rhythm, normal S1 S2, no S3 or S4, no murmur, click or rub, no peripheral edema  ABDOMEN: soft, mild tenderness to palpation in LLQ, no rebound or guarding  NEURO: Normal strength and tone, mentation intact and speech normal  PSYCH: mentation appears normal, affect normal/bright        Signed Electronically by: Raven Sharma MD

## 2025-02-27 ENCOUNTER — MYC MEDICAL ADVICE (OUTPATIENT)
Dept: FAMILY MEDICINE | Facility: CLINIC | Age: 40
End: 2025-02-27
Payer: COMMERCIAL

## 2025-03-03 ENCOUNTER — TELEPHONE (OUTPATIENT)
Dept: FAMILY MEDICINE | Facility: CLINIC | Age: 40
End: 2025-03-03

## 2025-03-03 ENCOUNTER — HOSPITAL ENCOUNTER (OUTPATIENT)
Dept: ULTRASOUND IMAGING | Facility: CLINIC | Age: 40
Discharge: HOME | End: 2025-03-03
Attending: FAMILY MEDICINE | Admitting: FAMILY MEDICINE
Payer: COMMERCIAL

## 2025-03-03 DIAGNOSIS — R10.2 PELVIC PAIN IN FEMALE: ICD-10-CM

## 2025-03-03 DIAGNOSIS — N80.129 ENDOMETRIOMA OF OVARY: Primary | ICD-10-CM

## 2025-03-03 PROCEDURE — 76856 US EXAM PELVIC COMPLETE: CPT | Mod: 26 | Performed by: RADIOLOGY

## 2025-03-03 PROCEDURE — 76830 TRANSVAGINAL US NON-OB: CPT

## 2025-03-03 PROCEDURE — 76830 TRANSVAGINAL US NON-OB: CPT | Mod: 26 | Performed by: RADIOLOGY

## 2025-03-03 NOTE — TELEPHONE ENCOUNTER
Called patient- discussed results of likely endometrioma of R ovary. Patient having LLQ pain but denies any RLQ. Plan to refer to OB-GYN for surveillance/treatment.    Raven Sharma MD

## 2025-03-04 ENCOUNTER — OFFICE VISIT (OUTPATIENT)
Dept: INTERNAL MEDICINE | Facility: CLINIC | Age: 40
End: 2025-03-04
Payer: COMMERCIAL

## 2025-03-04 VITALS
SYSTOLIC BLOOD PRESSURE: 110 MMHG | TEMPERATURE: 98 F | WEIGHT: 194 LBS | DIASTOLIC BLOOD PRESSURE: 72 MMHG | HEIGHT: 72 IN | HEART RATE: 62 BPM | BODY MASS INDEX: 26.28 KG/M2 | RESPIRATION RATE: 14 BRPM

## 2025-03-04 DIAGNOSIS — N63.11 MASS OF UPPER OUTER QUADRANT OF RIGHT BREAST: Primary | ICD-10-CM

## 2025-03-04 PROCEDURE — G2211 COMPLEX E/M VISIT ADD ON: HCPCS | Performed by: NURSE PRACTITIONER

## 2025-03-04 PROCEDURE — 3078F DIAST BP <80 MM HG: CPT | Performed by: NURSE PRACTITIONER

## 2025-03-04 PROCEDURE — 99213 OFFICE O/P EST LOW 20 MIN: CPT | Performed by: NURSE PRACTITIONER

## 2025-03-04 PROCEDURE — 3074F SYST BP LT 130 MM HG: CPT | Performed by: NURSE PRACTITIONER

## 2025-03-04 NOTE — PATIENT INSTRUCTIONS
Orders for diagnostic mammogram and breast ultrasound are in your chart.    You can call the radiology department for scheduling.  [phone].

## 2025-03-04 NOTE — PROGRESS NOTES
Assessment & Plan     Mass of upper outer quadrant of right breast  Messaged into her PCP office last week with concerns of mass in her right breast.  This did seem to start while she was menstruating.  It is not painful.    On exam, there is an ovoid shaped, smooth, movable nontender mass at the 10 o'clock position relative to the areola.    Diagnostic mammogram and breast ultrasound ordered.  I told her that the breast center/imaging center may change these orders which of course is okay    - MA Diagnostic Right w/ Charles; Future  - US Breast Right Limited 1-3 Quadrants; Future      Patient Instructions   Orders for diagnostic mammogram and breast ultrasound are in your chart.    You can call the radiology department for scheduling.  [phone].    The longitudinal plan of care for the diagnosis(es)/condition(s) as documented were addressed during this visit. Due to the added complexity in care, I will continue to support Phoebe in the subsequent management and with ongoing continuity of care.      BMI  Estimated body mass index is 26.31 kg/m  as calculated from the following:    Height as of this encounter: 1.829 m (6').    Weight as of this encounter: 88 kg (194 lb).             Subjective   Phoebe is a 40 year old, presenting for the following health issues:  Mass (Right breast )      3/4/2025     8:32 AM   Additional Questions   Roomed by [facility]    History of Present Illness       Reason for visit:  Lump on right breast  Symptom onset:  3-7 days ago   She is taking medications regularly.                      Objective    /72 (BP Location: Right arm, Patient Position: Sitting, Cuff Size: Adult Regular)   Pulse 62   Temp 98  F (36.7  C) (Oral)   Resp 14   Ht 1.829 m (6')   Wt 88 kg (194 lb)   LMP 02/25/2025 (Exact Date)   Breastfeeding No   BMI 26.31 kg/m    Body mass index is 26.31 kg/m .  Physical Exam   Assessment per A/P above              Signed Electronically by: Tomas Rushing,  CNP

## 2025-03-13 ENCOUNTER — ANCILLARY PROCEDURE (OUTPATIENT)
Dept: MAMMOGRAPHY | Facility: CLINIC | Age: 40
End: 2025-03-13
Attending: NURSE PRACTITIONER
Payer: COMMERCIAL

## 2025-03-13 DIAGNOSIS — N63.11 MASS OF UPPER OUTER QUADRANT OF RIGHT BREAST: ICD-10-CM

## 2025-03-13 PROCEDURE — G0279 TOMOSYNTHESIS, MAMMO: HCPCS | Performed by: RADIOLOGY

## 2025-03-13 PROCEDURE — 76642 ULTRASOUND BREAST LIMITED: CPT | Mod: RT | Performed by: RADIOLOGY

## 2025-03-13 PROCEDURE — 77066 DX MAMMO INCL CAD BI: CPT | Performed by: RADIOLOGY

## 2025-03-14 ENCOUNTER — ANCILLARY PROCEDURE (OUTPATIENT)
Dept: MAMMOGRAPHY | Facility: CLINIC | Age: 40
End: 2025-03-14
Attending: NURSE PRACTITIONER
Payer: COMMERCIAL

## 2025-03-14 DIAGNOSIS — N63.11 MASS OF UPPER OUTER QUADRANT OF RIGHT BREAST: ICD-10-CM

## 2025-03-14 PROCEDURE — 77066 DX MAMMO INCL CAD BI: CPT | Performed by: RADIOLOGY

## 2025-03-14 PROCEDURE — 38505 NEEDLE BIOPSY LYMPH NODES: CPT | Mod: RT | Performed by: RADIOLOGY

## 2025-03-14 PROCEDURE — 88341 IMHCHEM/IMCYTCHM EA ADD ANTB: CPT | Mod: 26 | Performed by: STUDENT IN AN ORGANIZED HEALTH CARE EDUCATION/TRAINING PROGRAM

## 2025-03-14 PROCEDURE — 76942 ECHO GUIDE FOR BIOPSY: CPT | Mod: RT | Performed by: RADIOLOGY

## 2025-03-14 PROCEDURE — 88342 IMHCHEM/IMCYTCHM 1ST ANTB: CPT | Mod: 26 | Performed by: STUDENT IN AN ORGANIZED HEALTH CARE EDUCATION/TRAINING PROGRAM

## 2025-03-14 PROCEDURE — 88305 TISSUE EXAM BY PATHOLOGIST: CPT | Mod: 26 | Performed by: STUDENT IN AN ORGANIZED HEALTH CARE EDUCATION/TRAINING PROGRAM

## 2025-03-14 PROCEDURE — 88377 M/PHMTRC ALYS ISHQUANT/SEMIQ: CPT | Mod: 26 | Performed by: MEDICAL GENETICS

## 2025-03-14 PROCEDURE — 88377 M/PHMTRC ALYS ISHQUANT/SEMIQ: CPT | Performed by: NURSE PRACTITIONER

## 2025-03-14 PROCEDURE — 88342 IMHCHEM/IMCYTCHM 1ST ANTB: CPT | Mod: TC | Performed by: NURSE PRACTITIONER

## 2025-03-14 PROCEDURE — G0279 TOMOSYNTHESIS, MAMMO: HCPCS | Performed by: RADIOLOGY

## 2025-03-14 PROCEDURE — 99000 SPECIMEN HANDLING OFFICE-LAB: CPT | Performed by: PATHOLOGY

## 2025-03-14 PROCEDURE — 19083 BX BREAST 1ST LESION US IMAG: CPT | Mod: RT | Performed by: RADIOLOGY

## 2025-03-14 PROCEDURE — 88341 IMHCHEM/IMCYTCHM EA ADD ANTB: CPT | Mod: TC | Performed by: NURSE PRACTITIONER

## 2025-03-14 PROCEDURE — 88360 TUMOR IMMUNOHISTOCHEM/MANUAL: CPT | Mod: 26 | Performed by: STUDENT IN AN ORGANIZED HEALTH CARE EDUCATION/TRAINING PROGRAM

## 2025-03-14 RX ORDER — IOPAMIDOL 755 MG/ML
100 INJECTION, SOLUTION INTRAVASCULAR ONCE
Status: ACTIVE | OUTPATIENT
Start: 2025-03-14

## 2025-03-14 RX ORDER — IOPAMIDOL 755 MG/ML
100 INJECTION, SOLUTION INTRAVASCULAR ONCE
Status: COMPLETED | OUTPATIENT
Start: 2025-03-14 | End: 2025-03-14

## 2025-03-14 RX ADMIN — IOPAMIDOL 100 ML: 755 INJECTION, SOLUTION INTRAVASCULAR at 08:40

## 2025-03-19 ENCOUNTER — PATIENT OUTREACH (OUTPATIENT)
Dept: ONCOLOGY | Facility: CLINIC | Age: 40
End: 2025-03-19
Payer: COMMERCIAL

## 2025-03-19 ENCOUNTER — TELEPHONE (OUTPATIENT)
Dept: INTERNAL MEDICINE | Facility: CLINIC | Age: 40
End: 2025-03-19
Payer: COMMERCIAL

## 2025-03-19 ENCOUNTER — TELEPHONE (OUTPATIENT)
Dept: MAMMOGRAPHY | Facility: CLINIC | Age: 40
End: 2025-03-19
Payer: COMMERCIAL

## 2025-03-19 DIAGNOSIS — D05.10 DUCTAL CARCINOMA IN SITU (DCIS) OF BREAST, UNSPECIFIED LATERALITY: Primary | ICD-10-CM

## 2025-03-19 DIAGNOSIS — C50.911 INVASIVE DUCTAL CARCINOMA OF BREAST, FEMALE, RIGHT (H): Primary | ICD-10-CM

## 2025-03-19 LAB
PATH REPORT.COMMENTS IMP SPEC: ABNORMAL
PATH REPORT.COMMENTS IMP SPEC: YES
PATH REPORT.FINAL DX SPEC: ABNORMAL
PATH REPORT.GROSS SPEC: ABNORMAL
PATH REPORT.MICROSCOPIC SPEC OTHER STN: ABNORMAL
PATH REPORT.RELEVANT HX SPEC: ABNORMAL
PATHOLOGY SYNOPTIC REPORT: ABNORMAL
PHOTO IMAGE: ABNORMAL

## 2025-03-19 NOTE — TELEPHONE ENCOUNTER
I tried calling the patient to discuss her abnormal breast biopsy results.  I left a message for her to call back.  If there is a time that works for her, we can try to connect and talk through the results and what they mean and what the plan moving forward could be.  If I cannot connect with her this morning, it may have to wait for early afternoon after my shift is over.

## 2025-03-19 NOTE — PROGRESS NOTES
New Patient Oncology Nurse Navigator Note     Referring provider: Tomas Rushing CNP      Referring Clinic/Organization: [facility]     Referred to (specialty:) Medical Oncology and Cancer Surgery     Requested provider (if applicable): Would like to be seen at [facility]     Date Referral Received: March 19, 2025     Evaluation for:  C50.911 (ICD-10-CM) - Invasive ductal carcinoma of breast, female, right (H)     Clinical History (per Nurse review of records provided):     Phoebe [place] is a 40 year old female who presented with a right breast mass leading to bilateral diagnostic mammograms and right breast ultrasound on 3/13/25. On mammogram there was a spiculated mass RIGHT upper outer breast .  No suspicious findings left breast. Ultrasound of the right breast 11:00 7 cm from the nipple shows an  irregular mass measuring 1.9 x 3.6 x 1.7 cm correlating with the lump. No suspicious axillary lymph nodes.    A contrast-enhanced mammogram preceded biopsy on 3/14. Standard mammographic views were performed with  tomosynthesis and synthetic mammogram Minimal background parenchymal enhancement. No suspicious enhancement  in the left breast. The suspicious right breast cancer has rim enhancement with a probable necrotic center measuring 3.6 x 2.2 x 2.1 cm.    3/14/25 - Case: [id number]                                   A. RIGHT breast, 11:00, 7 cm from nipple, mass, ultrasound guided core biopsy:  -INVASIVE BREAST CARCINOMA OF NO SPECIAL TYPE (INVASIVE DUCTAL CARCINOMA), Jonny grade 1  -Ductal carcinoma in situ (DCIS), nuclear grade 1, cribriform type  -Invasive carcinoma is estrogen receptor positive (%, strong), progesterone receptor positive (%, strong) and HER2 equivocal (score 2+) by immunohistochemistry (see biomarker reporting template below)  -HER2 FISH results will be reported separately by cytogenetics  -See comment  B. Lymph node, right axillary, biopsy:  -  Lymph node tissue, negative for metastatic carcinoma  Comment: Invasive carcinoma involves multiple tissue cores, and is 9 mm in greatest dimension in a single core.  The Jonny grade is 1 (tubule formation 2 + nuclear pleomorphism 2 + mitotic activity 1 = [place] score 5).      3/19 1539 Slides received in cytogenetics lab for HER2 by FISH. Team member at lab will inform tech/pathologists of 3/25 med onc consult date.      3/19 1435- Telephoned and spoke with Phoebe. Explained my role and purpose for the call. We are working to identify when we can expect HER2 by FISH from her biopsy and that will be a useful piece of information for both medical oncology and cancer surgery providers. She is traveling and will be until Monday 3/24. She has my contact number and calls welcomed as we are putting the pieces together.     3/19 1615 - Telephoned and left voice message for Phoebe informing her we can proceed with scheduling. Invited call at her convenience.    3/20 1114 - Telephoned and spoke with Phoebe. Reminded her of my role and purpose for the call. Writer received referral, reviewed for appropriate plan, and call warm transferred to New Patient Scheduling for completion.

## 2025-03-19 NOTE — TELEPHONE ENCOUNTER
Left a message for Phoebe regarding availability of her breast biopsy results.  Awaiting return phone call. Call back number left was [phone].

## 2025-03-19 NOTE — TELEPHONE ENCOUNTER
Spoke to Phoebe about the finding of Invasive Ductal Carcinoma in her right breast.  We discussed the Radiologist's recommendation of surgical and oncology consultation.  Referrals have been placed and someone will be reaching out to help coordinate the appointments.  Phoebe verbalized understanding of the plan.

## 2025-03-19 NOTE — TELEPHONE ENCOUNTER
The patient called back to speak with her provider as indicated, but they were unable to be connected at this time. The patient is at an airport right now, but her flight got delayed so her boarding is not until 12:30 PM. She will be available to receive a call until around boarding time. Routing high priority.     Jeana Roper RN  [facility]

## 2025-03-19 NOTE — TELEPHONE ENCOUNTER
Called and discussed preliminary results with patient. Answered questions the best I could. Referral to oncology placed.

## 2025-03-20 LAB — INTERPRETATION: NORMAL

## 2025-03-20 NOTE — TELEPHONE ENCOUNTER
RECORDS STATUS - ALL OTHER DIAGNOSIS      RECORDS RECEIVED FROM: [facility]   NOTES STATUS DETAILS   OFFICE NOTE from referring provider Epic 3/4/25: Tomas Rushing CNP   OPERATIVE REPORT Epic 3/14/25: US Breast Bx   MEDICATION LIST [facility]    LABS     PATHOLOGY REPORTS Report in Epic 3/14/25: [id number]    ANYTHING RELATED TO DIAGNOSIS Epic Most recent 1/9/25   IMAGING (NEED IMAGES & REPORT)     CT SCANS PACS 3/14/25-3/13/25   ULTRASOUND PACS 3/14/25: US Breast Bx  3/13/25: US Breast

## 2025-03-24 PROBLEM — Z17.0 MALIGNANT NEOPLASM OF OVERLAPPING SITES OF RIGHT BREAST IN FEMALE, ESTROGEN RECEPTOR POSITIVE (H): Status: ACTIVE | Noted: 2025-03-24

## 2025-03-24 PROBLEM — C50.811 MALIGNANT NEOPLASM OF OVERLAPPING SITES OF RIGHT BREAST IN FEMALE, ESTROGEN RECEPTOR POSITIVE (H): Status: ACTIVE | Noted: 2025-03-24

## 2025-03-24 NOTE — PROGRESS NOTES
[facility]   New Patient Visit    Name: Phoebe [place]  MRN: [id number]  Date of visit: Mar 25, 2025    Diagnosis: R breast cancer-ER+/TX+/ER2 2+ (group 5 negative)  Stage:    Cancer Staging   Malignant neoplasm of overlapping sites of right breast in female, estrogen receptor positive (H)  Staging form: Breast, AJCC 8th Edition  - Clinical stage from 3/24/2025: Stage IB (cT2, cN0, cM0, G1, ER+, TX+, HER2: Equivocal) - Signed by Jessika Morales MD on 3/24/2025      Molecular: n/a  Performance status: ECOG 0    Oncology History:   -3/14/25: New diagnosis of invasive ductal carcinoma, grade 1 with DCIS-ER and TX strongly positive, HER2 2+ (group 5 negative)-[id number]      HPI:   Phoebe [place] is a 40 year old healthy female who presents with a new diagnosis of R sided breast cancer. She noted a palpable mass in her right breast during the last week of February and messaged her PCP. She underwent a diagnostic mammogram and ultrasound on 3/13/25 showed a spiculated mass in the right upper outer quadrant with ultrasound showing a 1.9x3.6x1.7cm irregular mass. No suspicious axillary LN. L breast normal. Biopsy revealed invasive ductal carcinoma, grade 1 with DCIS (grade 1 cribriform type) with ER and TX strongly positive (both %) and hER2 2+ group 5 negative. One LN was negative for carcinoma.      Of note, Phoebe has minimal past medical history and recently developed some LLQ pain for which she underwent CT A/P showing several enlarged mesenteric lymph nodes, mild splenomegaly and 2 liver lesions most consistent with benign hemangiomas. Follow up pelvic ultrasound showed a likely endometrioma.     Otherwise, Phoebe is feeling well without any complaints. ROS negative.            PMHx  Past Medical History:   Diagnosis Date    Cervical high risk HPV (human papillomavirus) test positive 11/29/2021    11/8/13 NIL 11/18/19 NIL pap, neg HPV 11/29/21 NIL pap, +HR HPV (not 16/18).  "Plan: cotest in 1 year    Ovarian cyst    Family History:  -Mother- Stage I breast ca at 67, Ovarian ca at 69. Genetic testing negative.  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 Partner had vasectomy and she stopped hormonal contraception which she previously took for 10 years        Exam:   LMP 2025 (Exact Date)     Gen: well appearing  HEENT: NCAT  Neck/Lymph: No LAD  Breast: Palpable mass at least 3cm at 10:00-mobile. No LAD  Resp: CTAB  CV: RRR  Abd: Soft nt nd abdomen  Ext: No LE edema  Neuro: No focal deficits      Labs:   Reviewed in chart. Key findings include   Lab Results   Component Value Date    WBC 11.4 2025     Lab Results   Component Value Date    RBC 4.69 2025     Lab Results   Component Value Date    HGB 13.4 2025     Lab Results   Component Value Date    HCT 40.4 2025     No components found for: \"MCT\"  Lab Results   Component Value Date    MCV 86 2025     Lab Results   Component Value Date    MCH 28.6 2025     Lab Results   Component Value Date    MCHC 33.2 2025     Lab Results   Component Value Date    RDW 12.1 2025     Lab Results   Component Value Date     2025        Imaging:   All pertinent imaging studies personally reviewed, martin findings included in oncology history above    FINDINGS  MAMMOGRAM:  BREAST DENSITY: The breasts are heterogeneously dense, which may  obscure small masses.     Technique: Standard mammographic views were performed with  tomosynthesis and synthetic mammogram   Minimal background parenchymal enhancement. No suspicious enhancement  in the left breast.  The suspicious right breast cancer has rim enhancement with a probable  necrotic center measuring 3.6 x 2.2 x 2.1 cm.                                                                      IMPRESSION: BI-RADS CATEGORY: " 5 - Highly Suggestive of Malignancy.     RECOMMENDED FOLLOW-UP: Biopsy.  Right breast ultrasound-guided biopsy.  Right axillary biopsy.     The finding and recommendation were discussed with the patient at the  time of evaluation.     The results of the examination will be sent to the patient.       Pathology:   Reviewed in chart, key findings included in history above       A. RIGHT breast, 11:00, 7 cm from nipple, mass, ultrasound guided core biopsy:  -INVASIVE BREAST CARCINOMA OF NO SPECIAL TYPE (INVASIVE DUCTAL CARCINOMA), Jonny grade 1  -Ductal carcinoma in situ (DCIS), nuclear grade 1, cribriform type  -Invasive carcinoma is estrogen receptor positive, progesterone receptor positive and HER2 equivocal (score 2+) by immunohistochemistry (see biomarker reporting template below)  -HER2 FISH results will be reported separately by cytogenetics  -See comment     B. Lymph node, right axillary, biopsy:  - Lymph node tissue, negative for metastatic carcinoma    Ratio of HER2/MANUELA-17 signals  Phoebe [place]:  1.4 (KETURAH Negative)          Avg. number HER2 signals/nucleus:  2.9                               Avg. number MANUELA-17 signals/nucleus:  2.1          INTERPRETATION:  Group 5 (KETURAH Negative)  Per the American Society of Clinical Oncology-College of American Pathologists Clinical Practice Guideline Update (Anjana WHITE et al, 2023, Arch Pathol Lab Med 147:993), the HER2/MANUELA 17 ratio of 1.4 and average number of HER2 signals/cell of 2.9 places this specimen in Group 5 (KETURAH Negative).     Assessment and Plan:   Phoebe [place] is a 40 year old female with a new diagnosis of hormone receptor positive right breast cancer.      # T2N0 Right Breast Cancer (Stage IB)  -ER/SD strongly positive, HER2 negative (2+, group 5 negative), grade 1  -Today Phoebe and I discussed her diagnosis of early stage hormone receptor positive breast cancer in detail including her clinical stage. No other symptoms currently to warrant systemic staging  and pt recently had CT A/P.  We discussed treatment including surgery and endocrine therapy. Surgical options will be discussed by Dr. Larios in detail but I discussed general options of lumpectomy/radiation vs. mastectomy. Her preference is for lumpectomy and radiation. Given the size of her tumor (3.7cm) and preference for lumpectomy, I will discuss with Dr. Larios whether surgery can be done upfront or if there is any need for neoadjuvant therapy to facilitate surgery. I will go ahead and send a Mammaprint which will help determine type of treatment (chemotherapy vs endocrine therapy) should she need neoadjuvant therapy. Given her grade 1 disease, this is more likely to be low risk and chemotherapy may not be warranted. I also discussed the I-SPY clinical trial with Phoebe and she prefers SOC especially if low risk.  -send Mammaprint  -send genetic testing  -surgical evaluation with Dr. Larios    Fertility:  -no plan for children  -partner gas vasectomy for contraception    LLQ pain:  -resolved, likely acute illness  -CT A/P with enlarged mesenteric LNs    Hepatic lesions:  -most consistent with hemangiomas      Jessika Morales MD   of Medicine  Division of Hematology, Oncology and Transplantation    ---  50 minutes were spent on the date of the encounter performing chart review, history and exam, documentation, and further activities as noted above.      The longitudinal plan of care for the diagnosis(es)/condition(s) as documented were addressed during this visit. Due to the added complexity in care, I will continue to support Phoebe in the subsequent management and with ongoing continuity of care.

## 2025-03-25 ENCOUNTER — PRE VISIT (OUTPATIENT)
Dept: ONCOLOGY | Facility: CLINIC | Age: 40
End: 2025-03-25
Payer: COMMERCIAL

## 2025-03-25 ENCOUNTER — PATIENT OUTREACH (OUTPATIENT)
Dept: ONCOLOGY | Facility: CLINIC | Age: 40
End: 2025-03-25

## 2025-03-25 ENCOUNTER — ONCOLOGY VISIT (OUTPATIENT)
Dept: ONCOLOGY | Facility: CLINIC | Age: 40
End: 2025-03-25
Attending: NURSE PRACTITIONER
Payer: COMMERCIAL

## 2025-03-25 VITALS
HEART RATE: 72 BPM | OXYGEN SATURATION: 99 % | WEIGHT: 191.7 LBS | BODY MASS INDEX: 25.96 KG/M2 | TEMPERATURE: 97.7 F | DIASTOLIC BLOOD PRESSURE: 83 MMHG | RESPIRATION RATE: 16 BRPM | HEIGHT: 72 IN | SYSTOLIC BLOOD PRESSURE: 125 MMHG

## 2025-03-25 DIAGNOSIS — Z17.0 MALIGNANT NEOPLASM OF OVERLAPPING SITES OF RIGHT BREAST IN FEMALE, ESTROGEN RECEPTOR POSITIVE (H): ICD-10-CM

## 2025-03-25 DIAGNOSIS — C50.911 MALIGNANT NEOPLASM OF RIGHT BREAST IN FEMALE, ESTROGEN RECEPTOR POSITIVE, UNSPECIFIED SITE OF BREAST (H): Primary | ICD-10-CM

## 2025-03-25 DIAGNOSIS — D05.10 DUCTAL CARCINOMA IN SITU (DCIS) OF BREAST, UNSPECIFIED LATERALITY: ICD-10-CM

## 2025-03-25 DIAGNOSIS — Z17.0 MALIGNANT NEOPLASM OF RIGHT BREAST IN FEMALE, ESTROGEN RECEPTOR POSITIVE, UNSPECIFIED SITE OF BREAST (H): Primary | ICD-10-CM

## 2025-03-25 DIAGNOSIS — C50.811 MALIGNANT NEOPLASM OF OVERLAPPING SITES OF RIGHT BREAST IN FEMALE, ESTROGEN RECEPTOR POSITIVE (H): ICD-10-CM

## 2025-03-25 PROCEDURE — 99204 OFFICE O/P NEW MOD 45 MIN: CPT | Performed by: HOSPITALIST

## 2025-03-25 PROCEDURE — G2211 COMPLEX E/M VISIT ADD ON: HCPCS | Performed by: HOSPITALIST

## 2025-03-25 PROCEDURE — 99213 OFFICE O/P EST LOW 20 MIN: CPT | Performed by: HOSPITALIST

## 2025-03-25 ASSESSMENT — PAIN SCALES - GENERAL: PAINLEVEL_OUTOF10: NO PAIN (0)

## 2025-03-25 NOTE — PROGRESS NOTES
[facility]: Cancer Care                                                                                          Submitted requisition for Mammaprint to [facility] today 3/25/25. Order # [id number]    Signature:  Kinga Hamilton RNCC

## 2025-03-25 NOTE — LETTER
3/25/2025      Phoebe Alva  [address] [facility] [address]      Dear Colleague,    Thank you for referring your patient, Phoebe Alva, to the [facility]. Please see a copy of my visit note below.    [facility]   New Patient Visit    Name: Phoebe Alva  MRN: [id number]  Date of visit: Mar 25, 2025    Diagnosis: R breast cancer-ER+/NM+/ER2 2+ (group 5 negative)  Stage:    Cancer Staging   Malignant neoplasm of overlapping sites of right breast in female, estrogen receptor positive (H)  Staging form: Breast, AJCC 8th Edition  - Clinical stage from 3/24/2025: Stage IB (cT2, cN0, cM0, G1, ER+, NM+, HER2: Equivocal) - Signed by Jessika Morales MD on 3/24/2025      Molecular: n/a  Performance status: ECOG 0    Oncology History:   -3/14/25: New diagnosis of invasive ductal carcinoma, grade 1 with DCIS-ER and NM strongly positive, HER2 2+ (group 5 negative)-[id number]      HPI:   Phoebe Alva is a 40 year old healthy female who presents with a new diagnosis of R sided breast cancer. She noted a palpable mass in her right breast during the last week of February and messaged her PCP. She underwent a diagnostic mammogram and ultrasound on 3/13/25 showed a spiculated mass in the right upper outer quadrant with ultrasound showing a 1.9x3.6x1.7cm irregular mass. No suspicious axillary LN. L breast normal. Biopsy revealed invasive ductal carcinoma, grade 1 with DCIS (grade 1 cribriform type) with ER and NM strongly positive (both %) and hER2 2+ group 5 negative. One LN was negative for carcinoma.      Of note, Phoebe has minimal past medical history and recently developed some LLQ pain for which she underwent CT A/P showing several enlarged mesenteric lymph nodes, mild splenomegaly and 2 liver lesions most consistent with benign hemangiomas. Follow up pelvic ultrasound showed a likely endometrioma.     Otherwise, Phoebe is feeling well without any  "complaints. ROS negative.            PMHx  Past Medical History:   Diagnosis Date     Cervical high risk HPV (human papillomavirus) test positive 2021 NIL / NIL pap, neg HPV 21 NIL pap, +HR HPV (not 16/18). Plan: cotest in 1 year    Ovarian cyst    Family History:  -Mother- Stage I breast ca at 67, Ovarian ca at 69. Genetic testing negative.  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 Partner had vasectomy and she stopped hormonal contraception which she previously took for 10 years        Exam:   LMP 2025 (Exact Date)     Gen: well appearing  HEENT: NCAT  Neck/Lymph: No LAD  Breast: Palpable mass at least 3cm at 10:00-mobile. No LAD  Resp: CTAB  CV: RRR  Abd: Soft nt nd abdomen  Ext: No LE edema  Neuro: No focal deficits      Labs:   Reviewed in chart. Key findings include   Lab Results   Component Value Date    WBC 11.4 2025     Lab Results   Component Value Date    RBC 4.69 2025     Lab Results   Component Value Date    HGB 13.4 2025     Lab Results   Component Value Date    HCT 40.4 2025     No components found for: \"MCT\"  Lab Results   Component Value Date    MCV 86 2025     Lab Results   Component Value Date    MCH 28.6 2025     Lab Results   Component Value Date    MCHC 33.2 2025     Lab Results   Component Value Date    RDW 12.1 2025     Lab Results   Component Value Date     2025        Imaging:   All pertinent imaging studies personally reviewed, martin findings included in oncology history above    FINDINGS  MAMMOGRAM:  BREAST DENSITY: The breasts are heterogeneously dense, which may  obscure small masses.     Technique: Standard mammographic views were performed with  tomosynthesis and synthetic mammogram   Minimal background parenchymal enhancement. No suspicious " enhancement  in the left breast.  The suspicious right breast cancer has rim enhancement with a probable  necrotic center measuring 3.6 x 2.2 x 2.1 cm.                                                                      IMPRESSION: BI-RADS CATEGORY: 5 - Highly Suggestive of Malignancy.     RECOMMENDED FOLLOW-UP: Biopsy.  Right breast ultrasound-guided biopsy.  Right axillary biopsy.     The finding and recommendation were discussed with the patient at the  time of evaluation.     The results of the examination will be sent to the patient.       Pathology:   Reviewed in chart, key findings included in history above       A. RIGHT breast, 11:00, 7 cm from nipple, mass, ultrasound guided core biopsy:  -INVASIVE BREAST CARCINOMA OF NO SPECIAL TYPE (INVASIVE DUCTAL CARCINOMA), [place] grade 1  -Ductal carcinoma in situ (DCIS), nuclear grade 1, cribriform type  -Invasive carcinoma is estrogen receptor positive, progesterone receptor positive and HER2 equivocal (score 2+) by immunohistochemistry (see biomarker reporting template below)  -HER2 FISH results will be reported separately by cytogenetics  -See comment     B. Lymph node, right axillary, biopsy:  - Lymph node tissue, negative for metastatic carcinoma    Ratio of HER2/MANUELA-17 signals  Phoebe Alva:  1.4 (KETURAH Negative)          Avg. number HER2 signals/nucleus:  2.9                               Avg. number MANUELA-17 signals/nucleus:  2.1          INTERPRETATION:  Group 5 (KETURAH Negative)  Per the American Society of Clinical Oncology-College of American Pathologists Clinical Practice Guideline Update (Anjana AC et al, 2023, Arch Pathol Lab Med 147:993), the HER2/MANUELA 17 ratio of 1.4 and average number of HER2 signals/cell of 2.9 places this specimen in Group 5 (KETURAH Negative).     Assessment and Plan:   Phoebe Alva is a 40 year old female with a new diagnosis of hormone receptor positive right breast cancer.      # T2N0 Right Breast Cancer (Stage IB)  -ER/CA strongly  positive, HER2 negative (2+, group 5 negative), grade 1  -Today Phoebe and I discussed her diagnosis of early stage hormone receptor positive breast cancer in detail including her clinical stage. No other symptoms currently to warrant systemic staging and pt recently had CT A/P.  We discussed treatment including surgery and endocrine therapy. Surgical options will be discussed by Dr. Larios in detail but I discussed general options of lumpectomy/radiation vs. mastectomy. Her preference is for lumpectomy and radiation. Given the size of her tumor (3.7cm) and preference for lumpectomy, I will discuss with Dr. Larios whether surgery can be done upfront or if there is any need for neoadjuvant therapy to facilitate surgery. I will go ahead and send a Mammaprint which will help determine type of treatment (chemotherapy vs endocrine therapy) should she need neoadjuvant therapy. Given her grade 1 disease, this is more likely to be low risk and chemotherapy may not be warranted. I also discussed the I-SPY clinical trial with Phoebe and she prefers SOC especially if low risk.  -send Mammaprint  -send genetic testing  -surgical evaluation with Dr. Larios    Fertility:  -no plan for children  -partner gas vasectomy for contraception    LLQ pain:  -resolved, likely acute illness  -CT A/P with enlarged mesenteric LNs    Hepatic lesions:  -most consistent with hemangiomas      Jessika Morales MD   of Medicine  Division of Hematology, Oncology and Transplantation    ---  50 minutes were spent on the date of the encounter performing chart review, history and exam, documentation, and further activities as noted above.      The longitudinal plan of care for the diagnosis(es)/condition(s) as documented were addressed during this visit. Due to the added complexity in care, I will continue to support Phoebe in the subsequent management and with ongoing continuity of care.           Again, thank you for allowing me to participate  in the care of your patient.        Sincerely,        Jessika Morales MD    Electronically signed

## 2025-03-25 NOTE — NURSING NOTE
Oncology Rooming Note    March 25, 2025 8:09 AM   Phoebe Alva is a 40 year old female who presents for:    Chief Complaint   Patient presents with    Oncology Clinic Visit     Ductal carcinoma in situ (DCIS)      Initial Vitals: /83 (BP Location: Right arm, Patient Position: Sitting, Cuff Size: Adult Regular)   Pulse 72   Temp 97.7  F (36.5  C) (Oral)   Resp 16   Ht 1.829 m (6')   Wt 87 kg (191 lb 11.2 oz)   LMP 03/24/2025 (Exact Date)   SpO2 99%   BMI 26.00 kg/m   Estimated body mass index is 26 kg/m  as calculated from the following:    Height as of this encounter: 1.829 m (6').    Weight as of this encounter: 87 kg (191 lb 11.2 oz). Body surface area is 2.1 meters squared.  No Pain (0) Comment: Data Unavailable   Patient's last menstrual period was 03/24/2025 (exact date).  Allergies reviewed: Yes  Medications reviewed: Yes    Medications: Medication refills not needed today.  Pharmacy name entered into [employer]: [id number] - [place] [address]    Frailty Screening:   Is the patient here for a new oncology consult visit in cancer care? 1. Yes. Over the past month, have you experienced difficulty or required a caregiver to assist with:   1. Balance, walking or general mobility (including any falls)? NO  2. Completion of self-care tasks such as bathing, dressing, toileting, grooming/hygiene?  NO  3. Concentration or memory that affects your daily life?  NO     PHQ9:  Did this patient require a PHQ9?: No      Clinical concerns: none      Katharina Durand

## 2025-04-01 LAB — SPECIMEN STATUS: NORMAL

## 2025-04-02 ENCOUNTER — VIRTUAL VISIT (OUTPATIENT)
Dept: ONCOLOGY | Facility: CLINIC | Age: 40
End: 2025-04-02
Attending: FAMILY MEDICINE
Payer: COMMERCIAL

## 2025-04-02 DIAGNOSIS — Z80.41 FAMILY HISTORY OF MALIGNANT NEOPLASM OF OVARY: ICD-10-CM

## 2025-04-02 DIAGNOSIS — C50.811 MALIGNANT NEOPLASM OF OVERLAPPING SITES OF RIGHT BREAST IN FEMALE, ESTROGEN RECEPTOR POSITIVE (H): Primary | ICD-10-CM

## 2025-04-02 DIAGNOSIS — Z80.3 FAMILY HISTORY OF MALIGNANT NEOPLASM OF BREAST: ICD-10-CM

## 2025-04-02 DIAGNOSIS — Z17.0 MALIGNANT NEOPLASM OF OVERLAPPING SITES OF RIGHT BREAST IN FEMALE, ESTROGEN RECEPTOR POSITIVE (H): Primary | ICD-10-CM

## 2025-04-02 PROCEDURE — 96041 GENETIC COUNSELING SVC EA 30: CPT | Mod: GT,95 | Performed by: GENETIC COUNSELOR, MS

## 2025-04-02 NOTE — PATIENT INSTRUCTIONS
BREAST ACTIONABLE PANEL    The Breast Actionable cancer panel is a genetic test which analyzes 11 genes known to be associated with an increased lifetime risk of breast and other cancers. Published medical guidelines exist for detection, prevention, risk reduction, and/or treatment for individuals with mutations in these genes.     Of note, the Breast Actionable cancer panel is not considered to be comprehensive. Individuals with a family history of other cancers should be seen by a genetic counselor to discuss the family history and the possibility of expanded genetic testing.     GENES (11) ASSOCIATED CANCER(S) ASSOCIATED CANCER SYNDROME(S)   MAGGY breast, pancreas    BARD1 breast    BRCA1 breast, ovary, pancreas, melanoma, prostate, male breast Hereditary breast /ovarian cancer syndrome (HBOC)   BRCA2 breast, ovary, pancreas, melanoma, prostate, male breast Hereditary breast /ovarian cancer syndrome (HBOC)   CDH1 breast, stomach, colon Hereditary diffuse gastric cancer   CHEK2 breast, colon, prostate    NF1 breast, brain, peripheral nervous system Neurofibromatosis 1   PALB2 breast, pancreas    PTEN breast, thyroid, uterus, colon, kidney Cowden syndrome, Qzoujdjy-Iozce-Orxjkveeb syndrome (BRRS), PTEN-related Proteus syndrome (PS), Proteus-like syndrome   STK11 breast, ovary, colon, pancreas, stomach, uterus, cervix Peutz-Jeghers syndrome   TP53 breast, bone, brain, soft tissues, adrenal cortex Li-Fraumeni syndrome     Meeting with a Genetic Counselor  After you receive the results of the Breast Actionable Panel testing, providers will often refer you to see a genetic counselor. This is because patients can have a family history of cancer other than breast cancer and may benefit from a discussion about comprehensive, expanded genetic testing.     Additionally, you and the genetic counselor will discuss the impact of genetic testing on you and your family members, go over your family health history, and talk  about potential screening and interventions.     Assessing Cancer Risk  Only about 5-10% of cancers are thought to be due to an inherited cancer susceptibility gene.  These families often have:  Several people with the same or related types of cancer  Cancers diagnosed at a young age (before age 50)  Individuals with more than one primary cancer  Multiple generations of the family affected with cancer    Some people may be candidates for genetic testing of more than one gene.  For these families, genetic testing using a multi-gene cancer panel may be offered.  These panels may test genes known to increase the risk for breast (and other) cancers: MAGGY, BRCA1, BRCA2, CDH1, CHEK2, PALB2, PTEN, and TP53.  The purpose of this handout is to serve as a brief summary of the high/moderate-risk breast cancer genes which have published clinical management guidelines for individuals who are found to carry a mutation.    BRCA1 and BRCA2-Hereditary Breast and Ovarian Cancer Syndrome (HBOC)  A single mutation in one of the copies of BRCA1 or BRCA2 increases the risk for breast and ovarian cancer, among others.  The risk for pancreatic cancer and melanoma may also be slightly increased in some families.  The tables below list the chance that someone with a BRCA mutation would develop cancer in his or her lifetime1,2,3,4.          Women   Men     General Population BRCA+    General Population BRCA+   Breast  12% 40-80%  Breast <1% ~7%   Ovarian  1-2% 10-40%  Prostate 16% 20%       A person s ethnic background is also important to consider, as individuals of Ashkenazi Quaker ancestry have a higher chance of having a BRCA gene mutation.  There are three BRCA mutations that occur more frequently in this population.     Individuals who inherit two BRCA2 mutations--one from their mother and one from their father--have a condition called Fanconi Anemia.  This rare autosomal recessive condition is associated with short stature,  developmental delay, bone marrow failure, and increased risk for childhood cancers.    TP53-Li-Fraumeni Syndrome (LFS)  LFS is a cancer predisposition syndrome. Individuals with LFS are at an increased risk for developing cancer at a young age. The general lifetime risk for development of cancer is 50% by age 30 and 90% by age 60.  LFS is caused by a mutation in the TP53 gene.  A single mutation in one of the copies of TP53 increases the risk for multiple cancers.     Core Cancers: Sarcomas, Breast, Brain, Lung, Leukemias/Lymphomas, Adrenocortical carcinomas  Other Cancers: Gastrointestinal, Thyroid, Skin, Genitourinary    PTEN-Cowden Syndrome  Cowden syndrome is a hereditary condition that increases the risk for breast, thyroid, endometrial, and kidney cancer.  Cowden syndrome is caused by a mutation in the PTEN gene.  A single mutation in one of the copies of PTEN causes Cowden syndrome and increases cancer risk.  The table below shows the chance that someone with a PTEN mutation would develop cancer in their lifetime5,6.  Other benign features seen in some individuals with Cowden syndrome include benign skin lesions (facial papules, keratoses, lipomas), learning disability, autism, thyroid nodules, colon polyps, and larger head size.      Lifetime Cancer Risk   Cancer Type General Population Cowden Syndrome   Breast  12% 25-50%*   Thyroid  1% 35%   Renal 1-2% 35%   Endometrial  2-3% 28%   Colon 5% 9%   Melanoma 2-3% >5%     *One recent study found breast cancer risk to be increased to 85%    CDH1-Hereditary Diffuse Gastric Cancer (HDGC)  Currently, one gene is known to cause hereditary diffuse gastric cancer: CDH1.  Individuals with HDGC are at increased risk for diffuse gastric cancer and lobular breast cancer. Of people diagnosed with HDGC, 30-50% have a mutation in the CDH1 gene.  This suggests there are likely other genes that may cause HDGC that have not been identified yet.      Lifetime Cancer Risks     General Pop HDGC    Diffuse Gastric  <1% ~80%   Breast 12% 39-52%       Additional Genes Associated with Increased Breast Cancer Risk  PALB2  Mutations in PALB2 have been shown to increase the risk of breast cancer up to 33-58% in some families; where individuals fall within this risk range is dependent upon family history.9 PALB2 mutations have also been associated with increased risk for pancreatic cancer, although this risk has not been quantified yet.  Individuals who inherit two PALB2 mutations--one from their mother and one from their father--have a condition called Fanconi Anemia.  This rare autosomal recessive condition is associated with short stature, developmental delay, bone marrow failure, and increased risk for childhood cancers.    MAGGY  MAGGY is a moderate-risk breast cancer gene. Women who have a mutation in MAGGY can have between a 2-4 fold increased risk for breast cancer compared to the general population.10 MAGGY mutations have also been associated with increased risk for pancreatic cancer, however an estimate of this cancer risk is not well understood.11  Individuals who inherit two MAGGY mutations have a condition called ataxia-telangiectasia (AT).  This rare autosomal recessive condition affects the nervous system and immune system, and is associated with progressive cerebellar ataxia beginning in childhood.  Individuals with ataxia-telangiectasia often have a weakened immune system and have an increased risk for childhood cancers.         CHEK2   CHEK2 is a moderate-risk breast cancer gene.  Women who have a mutation in CHEK2 have around a 2-fold increased risk for breast cancer compared to the general population, and this risk may be higher depending upon family history.12,13,14 Mutations in CHEK2 have also been shown to increase the risk of a number of other cancers, including colon and prostate, however these cancer risks are currently not well understood.      Inheritance   All of the genes  reviewed above are inherited in an autosomal dominant pattern. This means that if a parent has a mutation, each of his or her children will have a 50% chance of inheriting that same mutation.  Therefore, each child--male or female--would have a 50% chance of being at increased risk for developing cancer.        Image obtained from Genetics Home Reference, 2013     In rare cases, there can be mutations in both copies of these genes (one from each parent), leading to different autosomal recessive conditions.  Mutations in some genes can occur de beverly, which means that a person s mutation occurred for the first time in them and was not inherited from a parent.  Now that they have the mutation, however, it can be passed on to future generations.     Genetic Testing  Genetic testing involves a blood test and will look for any harmful mutations within genes that are associated with increased cancer risk.  If possible, it is recommended that the person(s) who has had cancer be tested before other family members.  That person will give us the most useful information about whether or not a specific gene is associated with the cancer in the family.    Results  There are three possible results of genetic testing:  Positive--a harmful mutation was identified in one or more of the genes  Negative--no mutation was identified in any of the genes on this panel  Variant of unknown significance--a variation in one of the genes was identified, but it is unclear how this impacts cancer risk in the family    Advantages and Disadvantages  There are advantages and disadvantages to genetic testing.  Advantages  May clarify your cancer risk  Can help you make medical decisions  May explain the cancers in your family  May give useful information to your family members (if you share your results)    Disadvantages  Possible negative emotional impact of learning about inherited cancer risk  Uncertainty in interpreting a negative test result in  some situations  Possible genetic discrimination concerns (see below)    Genetic Information Nondiscrimination Act (MICHAEL)  MICHAEL is a federal law that protects individuals from health insurance or employment discrimination based on a genetic test result alone.  Although rare, there are currently no legal discrimination protections in terms of life insurance, long term care, or disability insurances. Visit the National Human Genome Research [place] genome.gov/65787256 to learn more.    Reducing Cancer Risk  Each of the genes listed within this handout have nationally recognized cancer screening guidelines that would be recommended for individuals who test positive.  In addition to increased cancer screening, surgeries may be offered or recommended to reduce cancer risk.  Recommendations are based upon an individual s genetic test result as well as their personal and family history of cancer.    Questions to Think About Regarding Genetic Testing  What effect will the test result have on me and my relationship with my family members if I have an inherited gene mutation?  If I don t have a gene mutation?  Should I share my test results, and how will my family react to this news, which may also affect them?  Are my children ready to learn new information that may one day affect their own health?    Resources  FORCE: Facing Our Risk of Cancer Empowered facingourrisk.org   Bright Pink bebrightpink.org   Li-Fraumeni Syndrome Association lfsassociation.org   PTEN World [employer]   No stomach for cancer, Inc. nostomachforcancer.org   Stomach cancer relief network scrnet.org   Collaborative Group of the Americas on Inherited Colorectal Cancer (CGA) cgaicc.com    Cancer Care cancercare.org   American Cancer Society (ACS) cancer.org   National Cancer [place] (NCI) cancer.gov     Cancer Risk Management Program [phone] ([phone])  Ino Menchaca, MS [facility]  [phone]  Angelia Kruger, MS, [facility] [place] [phone]  Courtney  Malvin, MS, [facility]  [phone]  Larisa Bowden, MS, [facility]  [phone]  Madelaine Albert, MS, [facility]  [phone]  Haley Enriquez, MS, [facility]  [phone]  Nadia Ro, MS, [facility]  [phone]    References  Ashlyn FROST, Benita PDP, Narod S, Risronen CARVALHO, Leigh JE, Jesus JL, Aliyah N, Marina H, Sukumar O, Kannan A, Jennifer B, Radikeri P, Manwendy S, Cristi DM, Prasad N, Daniel E, Andre H, Pj E, Enma J, Kajal J, Kristen B, Les H, Larissa S, Eerola H, Gisselle H, Peter K, Anamika OP. Average risks of breast and ovarian cancer associated with BRCA1 or BRCA2 mutations detected in case series unselected for family history: a combined analysis of 222 studies. Am J Hum Velvet. 2003;72:1117-30.  Neville N, Lindsay M, Cleo G.  BRCA1 and BRCA2 Hereditary Breast and Ovarian Cancer. Gene Reviews online. 2013.  Isidoro YC, Mathew S, Shannan G, Cox S. Breast cancer risk among male BRCA1 and BRCA2 mutation carriers. J Natl Cancer Inst. 2007;99:1811-4.  David DG, Dangelo I, Javier J, Sd E, Tip ER, Lanicole F. Risk of breast cancer in male BRCA2 carriers. J Med Velvet. 2010;47:710-1.  Duc MH, Deshawn J, Rob J, Keily CAMPO, Angeli MS, Eng C. Lifetime cancer risks in individuals with germline PTEN mutations. Clin Cancer Res. 2012;18:400-7.  Pilluigi ROD. Cowden Syndrome: A Critical Review of the Clinical Literature. J Velvet . 2009:18:13-27.  National Comprehensive Cancer Network. Clinical practice guidelines in oncology, colorectal cancer screening. Available online (registration required). 2013.  National Cancer [place]. SEER Cancer Stat Fact Sheets.  December 2013.  Ashlyn JORDAN, et al. Breast-Cancer Risk in Families with Mutations in PALB2. NE. 2014; 371(6):497-506.  Sudha A, Alex D, Seth S, Phoebe P, Celine T, Akilah M, Olegario B, Yenifer H, Lavon R, Alison K, Haleigh L, David DG, Cristi D, Arnoldo DF, Valarie MR, The Breast Cancer Susceptibility Collaboration (UK) & Brandi [place]. MAGGY  mutations that cause ataxia-telangiectasia are breast cancer susceptibility alleles. Nature Genetics. 2006;38:873-875  Huber N , Flory Y, Abbie J, Pinky L, Bharath GM , Julissa ML, Pelon S, Gomez AG, Sanjana S, Kelsey ML, Magnolia J , Diane R, Bertha SZ, Joshua JR, Hayden VE, Truong M, Voshalinistein B, Waqas N, Gaviota RH, Leah KW, and Fran AP. MAGGY mutations in patients with hereditary pancreatic cancer. Cancer Discover. 2012;2:41-46  CHEK2 Breast Cancer Case-Control Consortium. CHEK2*1100delC and susceptibility to breast cancer: A collaborative analysis involving 10,860 breast cancer cases and 9,065 controls from 10 studies. Am J Hum Velvet, 74 (2004), pp. 6028-1835  Mera T, Ann Marie S, Sav K, et al. Spectrum of Mutations in BRCA1, BRCA2, CHEK2, and TP53 in Families at High Risk of Breast Cancer. DEBBIE, 2006;295(12):8658-6315.   Carissa C, Ryan D, Celine A, et al. Risk of breast cancer in women with a CHEK2 mutation with and without a family history of breast cancer. JClin Oncol. 2011;29:6257-6185.      TURNAROUND TIME  4 - 6 weeks    INSURANCE COVERAGE   A prior authorization will be submitted when your provider submits the order for this panel. Testing will be held until prior authorization is obtained. You will be contacted once prior authorization is completed. For details regarding coverage and out-of-pocket estimates, please contact a  at the [facility] at [phone].    About the [facility] ([facility]), [facility]  In collaborative effort with the [facility] and the [place] [employer], the [facility] offers a full range of diagnostic testing services, with a strong focus on quality, accuracy, and timeliness.

## 2025-04-02 NOTE — NURSING NOTE
Current patient location: [address] [place] [id number]    Is the patient currently in the state of [place]? YES    Visit mode: VIDEO    If the visit is dropped, the patient can be reconnected by:VIDEO VISIT: Send to e-mail at: [email or web address]    Will anyone else be joining the visit? NO  (If patient encounters technical issues they should call [phone]    Are changes needed to the allergy or medication list? N/A    Are refills needed on medications prescribed by this physician? NO    Rooming Documentation:  Questionnaire(s) not done per department protocol    Reason for visit: Consult    Arely CALHOUN

## 2025-04-02 NOTE — LETTER
4/2/2025      Phoebe Alva  [address] [facility] [address]      Dear Colleague,    Thank you for referring your patient, Phoebe Alva, to the [facility]. Please see a copy of my visit note below.    4/2/2025    Virtual Visit Details  Type of service:  Video Visit   Originating Location (pt. Location): Home  Distant Location (provider location):  Off-site  Platform used for Video Visit: [facility]    Referring Provider: Raven Sharma MD and Jessika Morales MD    Presenting Information:   Today Phoebe elected for a virtual genetic counseling visit through the Cancer Risk Management Program to discuss her personal and family history of cancer. We reviewed this history, cancer screening recommendations, and available genetic testing options.    Personal History:  Phoebe is a 40 year old female. 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 Treatment is currently being planned, but will likely begin with neoadjuvant endocrine therapy. Of note, Phoebe's genetic testing results may inform surgery decisions.    Phoebe has her ovaries, fallopian tubes and uterus in place. She recently had pelvic imaging, including a transvaginal ultrasound in March 2025, that identified a likely right-sided endometrioma. Given her age, Phoebe has not had a colonoscopy and does not regularly do any other cancer screening at this time.    Family History: (Please see scanned pedigree for detailed family history information)  Phoebe's mother is 69 and was diagnosed with breast cancer at age 66, followed by ovarian cancer at age 68. She reportedly pursued comprehensive genetic testing recently that was normal/negative. A copy of the test report was not available for review today.  Phoebe's paternal grandmother was diagnosed with lung cancer and passed away in her 50/60's; she had a history of smoking.  Phoebe's paternal grandfather was  diagnosed with a melanoma on his arm in his 60's and passed away from metastatic disease.  Her maternal ethnicity is [place] and Polish. Her paternal ethnicity is Latvian and [place]ian. There is no known Ashkenazi Anabaptist ancestry on either side of her family.    Discussion:  We reviewed the features of sporadic, familial, and hereditary cancers. In looking at Phoebe's family history, it is possible that a cancer susceptibility gene is present as Phoebe and a relative on each side of her family has been diagnosed with potentially related cancers (breast, ovarian, melanoma); Phoebe has also been diagnosed under age 50.  In order to inform treatment decisions, Dr. Morales ordered testing of the BRCA1 and BRCA2 genes with reflex to the Breast Actionable Panel, through the [facility].  We discussed the natural history and genetics of the 11 genes included in the Breast Actionable Panel: MAGGY, BARD1, BRCA1, BRCA2, CDH1, CHEK2, NF1, PALB2, PTEN, STK11, TP53. If a mutation is identified in one of these genes, it may inform current treatment decisions (I.e. lumpectomy versus double mastectomy, certain chemotherapies, avoidance of radiation, etc.) and future medical management (I.e. surgery to remove ovaries, more frequent colonoscopies, etc.). A detailed handout regarding these genes/syndromes and the information we discussed was provided to Phoebe at the end of our appointment today and can be found in the after visit summary. Topics included: inheritance pattern, cancer risks, cancer screening recommendations, and also risks, benefits and limitations of testing.  The results from this testing are pending at this time, as insurance pre-authorization is being obtained. Once her testing is initiated, the results are expected in a few weeks and will be communicated by Dr. Morales's team. We can also meet again to review Phoebe's Breast Actionable Panel results, if she has additional questions.  Based on her  personal and family history, Phoebe meets current National Comprehensive Cancer Network (NCCN) criteria for genetic testing of high penetrance breast cancer genes (I.e. BRCA1, BRCA2, CDH1, PALB2, PTEN, STK11, TP53, etc.).    We then discussed that there are additional genes that could cause increased risk for the cancers in Phoebe's family that are not included in the Breast Actionable Panel. For example, mutations in the RAD51C and RAD51D genes are associated with increased risk for breast and ovarian cancer. As many of these genes present with overlapping features in a family and accurate cancer risk cannot always be established based upon the pedigree analysis alone, it would be reasonable for Phoebe to consider expanded testing to analyze additional genes.  Phoebe expressed interest in gathering as much information as possible from genetic testing. As such, we discussed the Comprehensive Hereditary Cancer Expanded Panel offered by the [facility].  Consent was obtained over the video for the expanded genetic test and no additional blood sample is needed at this time. Turn around time: 3-4 weeks after testing of the BRCA1 and BRCA2 genes, with reflex to the Breast Actionable Panel, is completed.   Medical Management: For Phoebe, we reviewed that the information from genetic testing may determine:  surgery to treat Phoebe's active cancer diagnosis (i.e. lumpectomy versus bilateral mastectomy),  additional cancer screening for which Phoebe may qualify (i.e. mammogram and breast MRI, more frequent colonoscopies, more frequent dermatologic exams, etc.),  options for risk reducing surgeries Phoebe could consider (i.e. surgery to remove her ovaries and/or uterus, etc.),    and targeted chemotherapies for Phoebe's active cancer, or if she were to develop certain cancers in the future (i.e. immunotherapy for individuals with Ayon syndrome, PARP inhibitors, etc.).   These recommendations and possible  targeted chemotherapies will be discussed in detail once genetic testing is completed.     Plan:  1) Today we discussed testing of the BRCA1 and BRCA2 genes, with reflex to the Breast Actionable Panel, ordered by Dr. Morales. Results are pending at this time, but are expected prior to surgery and will be communicated by Dr. Morales's team  2) Phoebe then elected to proceed with expanded genetic testing using the Comprehensive Hereditary Cancer Expanded Panel. No additional blood sample is needed to complete this testing.  3) The results should be available 3-4 weeks after her initial genetic testing is completed.  4) Phoebe will be contacted to schedule a virtual return visit with me to discuss the expanded results.    I spent 53 minutes on the date of the encounter doing chart review, history and exam, documentation and further activities as noted above.    Haley Enriquez MS, [facility]  Licensed, Certified Genetic Counselor  Office: [phone]  [email or web address]      Again, thank you for allowing me to participate in the care of your patient.        Sincerely,        Haley Enriquez GC    Electronically signed

## 2025-04-02 NOTE — PROGRESS NOTES
4/2/2025    Virtual Visit Details  Type of service:  Video Visit   Originating Location (pt. Location): Home  Distant Location (provider location):  Off-site  Platform used for Video Visit: Edmundo    Referring Provider: Raven Sharma MD and Jessika Morales MD    Presenting Information:   Today Phoebe elected for a virtual genetic counseling visit through the Cancer Risk Management Program to discuss her personal and family history of cancer. We reviewed this history, cancer screening recommendations, and available genetic testing options.    Personal History:  Phoebe is a 40 year old female. 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 Treatment is currently being planned, but will likely begin with neoadjuvant endocrine therapy. Of note, Phoebe's genetic testing results may inform surgery decisions.    Phoebe has her ovaries, fallopian tubes and uterus in place. She recently had pelvic imaging, including a transvaginal ultrasound in March 2025, that identified a likely right-sided endometrioma. Given her age, Phoebe has not had a colonoscopy and does not regularly do any other cancer screening at this time.    Family History: (Please see scanned pedigree for detailed family history information)  Phoebe's mother is 69 and was diagnosed with breast cancer at age 66, followed by ovarian cancer at age 68. She reportedly pursued comprehensive genetic testing recently that was normal/negative. A copy of the test report was not available for review today.  Phoebe's paternal grandmother was diagnosed with lung cancer and passed away in her 50/60's; she had a history of smoking.  Phoebe's paternal grandfather was diagnosed with a melanoma on his arm in his 60's and passed away from metastatic disease.  Her maternal ethnicity is [place] and Citizen of [place]. Her paternal ethnicity is Persian and [place]. There is no known Ashkenazi Bahai ancestry on either side of  her family.    Discussion:  We reviewed the features of sporadic, familial, and hereditary cancers. In looking at Phoebe's family history, it is possible that a cancer susceptibility gene is present as Phoebe and a relative on each side of her family has been diagnosed with potentially related cancers (breast, ovarian, melanoma); Phoebe has also been diagnosed under age 50.  In order to inform treatment decisions, Dr. Morales ordered testing of the BRCA1 and BRCA2 genes with reflex to the Breast Actionable Panel, through the [facility].  We discussed the natural history and genetics of the 11 genes included in the Breast Actionable Panel: MAGGY, BARD1, BRCA1, BRCA2, CDH1, CHEK2, NF1, PALB2, PTEN, STK11, TP53. If a mutation is identified in one of these genes, it may inform current treatment decisions (I.e. lumpectomy versus double mastectomy, certain chemotherapies, avoidance of radiation, etc.) and future medical management (I.e. surgery to remove ovaries, more frequent colonoscopies, etc.). A detailed handout regarding these genes/syndromes and the information we discussed was provided to Phoebe at the end of our appointment today and can be found in the after visit summary. Topics included: inheritance pattern, cancer risks, cancer screening recommendations, and also risks, benefits and limitations of testing.  The results from this testing are pending at this time, as insurance pre-authorization is being obtained. Once her testing is initiated, the results are expected in a few weeks and will be communicated by Dr. Morales's team. We can also meet again to review Phoebe's Breast Actionable Panel results, if she has additional questions.  Based on her personal and family history, Phoebe meets current National Comprehensive Cancer Network (NCCN) criteria for genetic testing of high penetrance breast cancer genes (I.e. BRCA1, BRCA2, CDH1, PALB2, PTEN, STK11, TP53, etc.).    We then discussed that  there are additional genes that could cause increased risk for the cancers in Phoebe's family that are not included in the Breast Actionable Panel. For example, mutations in the RAD51C and RAD51D genes are associated with increased risk for breast and ovarian cancer. As many of these genes present with overlapping features in a family and accurate cancer risk cannot always be established based upon the pedigree analysis alone, it would be reasonable for Phoebe to consider expanded testing to analyze additional genes.  Phoebe expressed interest in gathering as much information as possible from genetic testing. As such, we discussed the Comprehensive Hereditary Cancer Expanded Panel offered by the [facility].  Consent was obtained over the video for the expanded genetic test and no additional blood sample is needed at this time. Turn around time: 3-4 weeks after testing of the BRCA1 and BRCA2 genes, with reflex to the Breast Actionable Panel, is completed.   Medical Management: For Phoebe, we reviewed that the information from genetic testing may determine:  surgery to treat Phoebe's active cancer diagnosis (i.e. lumpectomy versus bilateral mastectomy),  additional cancer screening for which Phoebe may qualify (i.e. mammogram and breast MRI, more frequent colonoscopies, more frequent dermatologic exams, etc.),  options for risk reducing surgeries Phoebe could consider (i.e. surgery to remove her ovaries and/or uterus, etc.),    and targeted chemotherapies for Phoebe's active cancer, or if she were to develop certain cancers in the future (i.e. immunotherapy for individuals with Ayon syndrome, PARP inhibitors, etc.).   These recommendations and possible targeted chemotherapies will be discussed in detail once genetic testing is completed.     Plan:  1) Today we discussed testing of the BRCA1 and BRCA2 genes, with reflex to the Breast Actionable Panel, ordered by Dr. Morales. Results are pending at  this time, but are expected prior to surgery and will be communicated by Dr. Morales's team  2) Phoebe then elected to proceed with expanded genetic testing using the Comprehensive Hereditary Cancer Expanded Panel. No additional blood sample is needed to complete this testing.  3) The results should be available 3-4 weeks after her initial genetic testing is completed.  4) Phoebe will be contacted to schedule a virtual return visit with me to discuss the expanded results.    I spent 53 minutes on the date of the encounter doing chart review, history and exam, documentation and further activities as noted above.    Haley Enriquez MS, [facility]  Licensed, Certified Genetic Counselor  Office: [phone]  [email or web address]

## 2025-04-03 ENCOUNTER — OFFICE VISIT (OUTPATIENT)
Dept: ONCOLOGY | Facility: CLINIC | Age: 40
End: 2025-04-03
Attending: NURSE PRACTITIONER
Payer: COMMERCIAL

## 2025-04-03 VITALS
RESPIRATION RATE: 12 BRPM | OXYGEN SATURATION: 95 % | HEART RATE: 69 BPM | WEIGHT: 191.5 LBS | DIASTOLIC BLOOD PRESSURE: 63 MMHG | TEMPERATURE: 97.6 F | BODY MASS INDEX: 25.94 KG/M2 | SYSTOLIC BLOOD PRESSURE: 123 MMHG | HEIGHT: 72 IN

## 2025-04-03 DIAGNOSIS — Z17.0 MALIGNANT NEOPLASM OF OVERLAPPING SITES OF RIGHT BREAST IN FEMALE, ESTROGEN RECEPTOR POSITIVE (H): Primary | ICD-10-CM

## 2025-04-03 DIAGNOSIS — C50.811 MALIGNANT NEOPLASM OF OVERLAPPING SITES OF RIGHT BREAST IN FEMALE, ESTROGEN RECEPTOR POSITIVE (H): Primary | ICD-10-CM

## 2025-04-03 PROCEDURE — 99213 OFFICE O/P EST LOW 20 MIN: CPT | Performed by: SURGERY

## 2025-04-03 RX ORDER — CEFAZOLIN SODIUM 2 G/50ML
2 SOLUTION INTRAVENOUS
OUTPATIENT
Start: 2025-04-03

## 2025-04-03 RX ORDER — CEFAZOLIN SODIUM 2 G/50ML
2 SOLUTION INTRAVENOUS SEE ADMIN INSTRUCTIONS
OUTPATIENT
Start: 2025-04-03

## 2025-04-03 RX ORDER — ACETAMINOPHEN 325 MG/1
975 TABLET ORAL ONCE
OUTPATIENT
Start: 2025-04-03 | End: 2025-04-03

## 2025-04-03 ASSESSMENT — PAIN SCALES - GENERAL: PAINLEVEL_OUTOF10: NO PAIN (0)

## 2025-04-03 NOTE — NURSING NOTE
"Oncology Rooming Note    April 3, 2025 7:45 AM   Phoebe Alva is a 40 year old female who presents for:    Chief Complaint   Patient presents with    Oncology Clinic Visit     Ductal carcinoma in situ      Initial Vitals: /63 (BP Location: Right arm, Patient Position: Sitting, Cuff Size: Adult Regular)   Pulse 69   Temp 97.6  F (36.4  C) (Oral)   Resp 12   Ht 1.84 m (6' 0.44\")   Wt 86.9 kg (191 lb 8 oz)   LMP 03/24/2025 (Exact Date)   SpO2 95%   BMI 25.66 kg/m   Estimated body mass index is 25.66 kg/m  as calculated from the following:    Height as of this encounter: 1.84 m (6' 0.44\").    Weight as of this encounter: 86.9 kg (191 lb 8 oz). Body surface area is 2.11 meters squared.  No Pain (0) Comment: Data Unavailable   Patient's last menstrual period was 03/24/2025 (exact date).  Allergies reviewed: Yes  Medications reviewed: Yes    Medications: Medication refills not needed today.  Pharmacy name entered into "[employer]: [id number] - [place], [place] - [address]    Frailty Screening:   Is the patient here for a new oncology consult visit in cancer care? 1. Yes. Over the past month, have you experienced difficulty or required a caregiver to assist with:   1. Balance, walking or general mobility (including any falls)? NO  2. Completion of self-care tasks such as bathing, dressing, toileting, grooming/hygiene?  NO  3. Concentration or memory that affects your daily life?  NO     PHQ9:  Did this patient require a PHQ9?: No      Clinical concerns:  none      Juliana Wilcox              "
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
## 2025-04-07 ENCOUNTER — PATIENT OUTREACH (OUTPATIENT)
Dept: PLASTIC SURGERY | Facility: CLINIC | Age: 40
End: 2025-04-07
Payer: COMMERCIAL

## 2025-04-07 NOTE — TELEPHONE ENCOUNTER
Left message for patient to return call to [phone] to schedule plastics consult for breast reconstruction. Per Shaylee BHANDARI RN pt needs to be seen sometime between 4/24 and 5/2.

## 2025-04-09 ENCOUNTER — VIRTUAL VISIT (OUTPATIENT)
Dept: ONCOLOGY | Facility: CLINIC | Age: 40
End: 2025-04-09
Attending: HOSPITALIST
Payer: COMMERCIAL

## 2025-04-09 VITALS — HEIGHT: 72 IN | BODY MASS INDEX: 25.79 KG/M2

## 2025-04-09 DIAGNOSIS — Z17.0 MALIGNANT NEOPLASM OF RIGHT BREAST IN FEMALE, ESTROGEN RECEPTOR POSITIVE, UNSPECIFIED SITE OF BREAST (H): Primary | ICD-10-CM

## 2025-04-09 DIAGNOSIS — C50.911 MALIGNANT NEOPLASM OF RIGHT BREAST IN FEMALE, ESTROGEN RECEPTOR POSITIVE, UNSPECIFIED SITE OF BREAST (H): Primary | ICD-10-CM

## 2025-04-09 ASSESSMENT — PAIN SCALES - GENERAL: PAINLEVEL_OUTOF10: NO PAIN (0)

## 2025-04-09 NOTE — TELEPHONE ENCOUNTER
MEDICAL RECORDS REQUEST   Radiation Oncology  [address] [place] [address]  Fax: [phone]          FUTURE VISIT INFORMATION                                                   Phoebe Alva, : 1985 scheduled for future visit at [facility] Radiation Oncology    RECORDS REQUESTED FOR VISIT                                                     BREAST     OFFICE NOTE from medical oncologist Epic 25: Dr. Jessika Morales   OFFICE NOTE from surgeon/plastic surgeon Epic 4/3/25: Dr. Isi Larios   OPERATIVE/BREAST BIOPSY REPORTS [facility] 25: Mastectomy   3/14/25: US Breast Bx    MEDICATION LIST [facility]    LABS     PATHOLOGY REPORTS Report in Epic 25:   3/14/25: [id number]    ANYTHING RELATED TO DIAGNOSIS Epic Most recent 25   IMAGING (NEED IMAGES & REPORT)     MAMMO/SURGICAL BREAST IMGS/SPECIMEN RADIOGRAPH PACS 3/14/25-3/13/25    ULTRASOUND PACS 3/14/25: US Breast Bx  3/13/25: US Breast

## 2025-04-09 NOTE — PROGRESS NOTES
Current patient location: [address] [place] [id number]    Is the patient currently in the state of [place]? YES    Visit mode: VIDEO    If the visit is dropped, the patient can be reconnected by:VIDEO VISIT: Send to e-mail at: [email or web address]    Will anyone else be joining the visit? NO  (If patient encounters technical issues they should call [phone]    Are changes needed to the allergy or medication list? No    Are refills needed on medications prescribed by this physician? NO    Rooming Documentation:  Questionnaire(s) completed    Reason for visit: RECHECK (Return [facility])    Chen CALHOUN

## 2025-04-09 NOTE — LETTER
4/9/2025      Phoebe Alva  [address] [facility] [address]      Dear Colleague,    Thank you for referring your patient, Phoebe Alva, to the [facility]. Please see a copy of my visit note below.    Current patient location: [address] [facility] [address]    Is the patient currently in the state of [place]? YES    Visit mode: VIDEO    If the visit is dropped, the patient can be reconnected by:VIDEO VISIT: Send to e-mail at: [email or web address]    Will anyone else be joining the visit? NO  (If patient encounters technical issues they should call [phone]    Are changes needed to the allergy or medication list? No    Are refills needed on medications prescribed by this physician? NO    Rooming Documentation:  Questionnaire(s) completed    Reason for visit: RECHECK (Return [facility])    [facility]   Return Patient Visit    Name: Phoebe Alva  MRN: [id number]  Date of visit: Apr 9, 2025    Diagnosis: R breast cancer-ER+/HI+/ER2 2+ (group 5 negative)  Stage:    Cancer Staging   Malignant neoplasm of overlapping sites of right breast in female, estrogen receptor positive (H)  Staging form: Breast, AJCC 8th Edition  - Clinical stage from 3/24/2025: Stage IB (cT2, cN0, cM0, G1, ER+, HI+, HER2: Equivocal) - Signed by Jessika Morales MD on 3/24/2025      Performance status: ECOG 0    Oncology History:   -3/14/25: New diagnosis of invasive ductal carcinoma, grade 1 with DCIS-ER and HI strongly positive, HER2 2+ (group 5 negative)-[id number]      HPI:   Phoebe Alva is a 40 year old healthy female who presents with a new diagnosis of R sided breast cancer. She noted a palpable mass in her right breast during the last week of February and messaged her PCP. She underwent a diagnostic mammogram and ultrasound on 3/13/25 showed a spiculated mass in the right upper outer quadrant with ultrasound showing a  "1.9x3.6x1.7cm irregular mass. No suspicious axillary LN. L breast normal. Biopsy revealed invasive ductal carcinoma, grade 1 with DCIS (grade 1 cribriform type) with ER and SD strongly positive (both %) and hER2 2+ group 5 negative. One LN was negative for carcinoma.    Of note, Phoebe has minimal past medical history and recently developed some LLQ pain for which she underwent CT A/P showing several enlarged mesenteric lymph nodes, mild splenomegaly and 2 liver lesions most consistent with benign hemangiomas. Follow up pelvic ultrasound showed a likely endometrioma.     Otherwise, Phoebe is feeling well without any complaints. ROS negative.    Interval History: Feeling well. No complaints. Surgery with Dr. Larios is scheduled. Mammaprint returned as low risk.    Exam:   Ht 1.835 m (6' 0.25\")   LMP 03/24/2025 (Exact Date)   BMI 25.79 kg/m      Gen: well appearing on video visit      Labs:   Reviewed in chart. Key findings include   Lab Results   Component Value Date    WBC 11.4 01/09/2025     Lab Results   Component Value Date    RBC 4.69 01/09/2025     Lab Results   Component Value Date    HGB 13.4 01/09/2025     Lab Results   Component Value Date    HCT 40.4 01/09/2025     No components found for: \"MCT\"  Lab Results   Component Value Date    MCV 86 01/09/2025     Lab Results   Component Value Date    MCH 28.6 01/09/2025     Lab Results   Component Value Date    MCHC 33.2 01/09/2025     Lab Results   Component Value Date    RDW 12.1 01/09/2025     Lab Results   Component Value Date     01/09/2025        Imaging:   All pertinent imaging studies personally reviewed, martin findings included in oncology history above    FINDINGS  MAMMOGRAM:  BREAST DENSITY: The breasts are heterogeneously dense, which may  obscure small masses.     Technique: Standard mammographic views were performed with  tomosynthesis and synthetic mammogram   Minimal background parenchymal enhancement. No suspicious enhancement  in the left " breast.  The suspicious right breast cancer has rim enhancement with a probable  necrotic center measuring 3.6 x 2.2 x 2.1 cm.                                                                      IMPRESSION: BI-RADS CATEGORY: 5 - Highly Suggestive of Malignancy.     RECOMMENDED FOLLOW-UP: Biopsy.  Right breast ultrasound-guided biopsy.  Right axillary biopsy.     The finding and recommendation were discussed with the patient at the  time of evaluation.     The results of the examination will be sent to the patient.       Pathology:   Reviewed in chart, key findings included in history above       A. RIGHT breast, 11:00, 7 cm from nipple, mass, ultrasound guided core biopsy:  -INVASIVE BREAST CARCINOMA OF NO SPECIAL TYPE (INVASIVE DUCTAL CARCINOMA), Jonny grade 1  -Ductal carcinoma in situ (DCIS), nuclear grade 1, cribriform type  -Invasive carcinoma is estrogen receptor positive, progesterone receptor positive and HER2 equivocal (score 2+) by immunohistochemistry (see biomarker reporting template below)  -HER2 FISH results will be reported separately by cytogenetics  -See comment     B. Lymph node, right axillary, biopsy:  - Lymph node tissue, negative for metastatic carcinoma    Ratio of HER2/MANUELA-17 signals  Phoebe Alva:  1.4 (KETURAH Negative)          Avg. number HER2 signals/nucleus:  2.9                               Avg. number MANUELA-17 signals/nucleus:  2.1          INTERPRETATION:  Group 5 (KETURAH Negative)  Per the American Society of Clinical Oncology-College of American Pathologists Clinical Practice Guideline Update (Anjana AC et al, 2023, Arch Pathol Lab Med 147:993), the HER2/MANUELA 17 ratio of 1.4 and average number of HER2 signals/cell of 2.9 places this specimen in Group 5 (KETURAH Negative).     Assessment and Plan:   Phoebe Alva is a 40 year old female with a new diagnosis of hormone receptor positive right breast cancer.      # T2N0 Right Breast Cancer (Stage IB)  -ER/KY strongly positive, HER2 negative  (2+, group 5 negative), grade 1  -plan for upfront surgery (lumpectomy then radiation)  -plan for adjuvant endocrine therapy. We discussed potential options including OFS/AI as per SOFT/TEXT trials but will await final pathology to make a decision.  -genetic testing sent.  -rad onc referral placed        Fertility:  -no plan for children  -partner gas vasectomy for contraception    LLQ pain:  -resolved, likely acute illness or endometrioma seen on ultrasound  -CT A/P with enlarged mesenteric Lns and US with endometrioma (<1% chance of malignancy)    Endometrioma:  -cystic lesion with <1% chance of malignancy  -genetic testing pending  -repeat us in 12 months    Hepatic lesions:  -most consistent with hemangiomas    Plan:  -return end of May for visit and discussion of adjuvant endocrine therapy     Jessika Morales MD   of Medicine  Division of Hematology, Oncology and Transplantation    ---  30 minutes were spent on the date of the encounter performing chart review, history and exam, documentation, and further activities as noted above.      The longitudinal plan of care for the diagnosis(es)/condition(s) as documented were addressed during this visit. Due to the added complexity in care, I will continue to support Phoebe in the subsequent management and with ongoing continuity of care.           Again, thank you for allowing me to participate in the care of your patient.        Sincerely,        Jessika Morales MD    Electronically signed

## 2025-04-09 NOTE — PROGRESS NOTES
" [facility]   Return Patient Visit    Name: Phoebe Alva  MRN: [id number]  Date of visit: Apr 9, 2025    Diagnosis: R breast cancer-ER+/WV+/ER2 2+ (group 5 negative)  Stage:    Cancer Staging   Malignant neoplasm of overlapping sites of right breast in female, estrogen receptor positive (H)  Staging form: Breast, AJCC 8th Edition  - Clinical stage from 3/24/2025: Stage IB (cT2, cN0, cM0, G1, ER+, WV+, HER2: Equivocal) - Signed by Jessika Morales MD on 3/24/2025      Performance status: ECOG 0    Oncology History:   -3/14/25: New diagnosis of invasive ductal carcinoma, grade 1 with DCIS-ER and WV strongly positive, HER2 2+ (group 5 negative)-[id number]      HPI:   Phoebe Alva is a 40 year old healthy female who presents with a new diagnosis of R sided breast cancer. She noted a palpable mass in her right breast during the last week of February and messaged her PCP. She underwent a diagnostic mammogram and ultrasound on 3/13/25 showed a spiculated mass in the right upper outer quadrant with ultrasound showing a 1.9x3.6x1.7cm irregular mass. No suspicious axillary LN. L breast normal. Biopsy revealed invasive ductal carcinoma, grade 1 with DCIS (grade 1 cribriform type) with ER and WV strongly positive (both %) and hER2 2+ group 5 negative. One LN was negative for carcinoma.    Of note, Phoebe has minimal past medical history and recently developed some LLQ pain for which she underwent CT A/P showing several enlarged mesenteric lymph nodes, mild splenomegaly and 2 liver lesions most consistent with benign hemangiomas. Follow up pelvic ultrasound showed a likely endometrioma.     Otherwise, Phoebe is feeling well without any complaints. ROS negative.    Interval History: Feeling well. No complaints. Surgery with Dr. Larios is scheduled. Mammaprint returned as low risk.    Exam:   Ht 1.835 m (6' 0.25\")   LMP 03/24/2025 (Exact Date)   BMI 25.79 kg/m      Gen: well appearing on " "video visit      Labs:   Reviewed in chart. Key findings include   Lab Results   Component Value Date    WBC 11.4 01/09/2025     Lab Results   Component Value Date    RBC 4.69 01/09/2025     Lab Results   Component Value Date    HGB 13.4 01/09/2025     Lab Results   Component Value Date    HCT 40.4 01/09/2025     No components found for: \"MCT\"  Lab Results   Component Value Date    MCV 86 01/09/2025     Lab Results   Component Value Date    MCH 28.6 01/09/2025     Lab Results   Component Value Date    MCHC 33.2 01/09/2025     Lab Results   Component Value Date    RDW 12.1 01/09/2025     Lab Results   Component Value Date     01/09/2025        Imaging:   All pertinent imaging studies personally reviewed, martin findings included in oncology history above    FINDINGS  MAMMOGRAM:  BREAST DENSITY: The breasts are heterogeneously dense, which may  obscure small masses.     Technique: Standard mammographic views were performed with  tomosynthesis and synthetic mammogram   Minimal background parenchymal enhancement. No suspicious enhancement  in the left breast.  The suspicious right breast cancer has rim enhancement with a probable  necrotic center measuring 3.6 x 2.2 x 2.1 cm.                                                                      IMPRESSION: BI-RADS CATEGORY: 5 - Highly Suggestive of Malignancy.     RECOMMENDED FOLLOW-UP: Biopsy.  Right breast ultrasound-guided biopsy.  Right axillary biopsy.     The finding and recommendation were discussed with the patient at the  time of evaluation.     The results of the examination will be sent to the patient.       Pathology:   Reviewed in chart, key findings included in history above       A. RIGHT breast, 11:00, 7 cm from nipple, mass, ultrasound guided core biopsy:  -INVASIVE BREAST CARCINOMA OF NO SPECIAL TYPE (INVASIVE DUCTAL CARCINOMA), Jonny grade 1  -Ductal carcinoma in situ (DCIS), nuclear grade 1, cribriform type  -Invasive carcinoma is estrogen " receptor positive, progesterone receptor positive and HER2 equivocal (score 2+) by immunohistochemistry (see biomarker reporting template below)  -HER2 FISH results will be reported separately by cytogenetics  -See comment     B. Lymph node, right axillary, biopsy:  - Lymph node tissue, negative for metastatic carcinoma    Ratio of HER2/MANUELA-17 signals  Phoebe Alva:  1.4 (KETURAH Negative)          Avg. number HER2 signals/nucleus:  2.9                               Avg. number MANUELA-17 signals/nucleus:  2.1          INTERPRETATION:  Group 5 (KETURAH Negative)  Per the American Society of Clinical Oncology-College of American Pathologists Clinical Practice Guideline Update (Anjana WHITE et al, 2023, Arch Pathol Lab Med 147:993), the HER2/MANUELA 17 ratio of 1.4 and average number of HER2 signals/cell of 2.9 places this specimen in Group 5 (KETURAH Negative).     Assessment and Plan:   Phoebe Alva is a 40 year old female with a new diagnosis of hormone receptor positive right breast cancer.      # T2N0 Right Breast Cancer (Stage IB)  -ER/KS strongly positive, HER2 negative (2+, group 5 negative), grade 1  -plan for upfront surgery (lumpectomy then radiation)  -plan for adjuvant endocrine therapy. We discussed potential options including OFS/AI as per SOFT/TEXT trials but will await final pathology to make a decision.  -genetic testing sent.  -rad onc referral placed        Fertility:  -no plan for children  -partner gas vasectomy for contraception    LLQ pain:  -resolved, likely acute illness or endometrioma seen on ultrasound  -CT A/P with enlarged mesenteric Lns and US with endometrioma (<1% chance of malignancy)    Endometrioma:  -cystic lesion with <1% chance of malignancy  -genetic testing pending  -repeat us in 12 months    Hepatic lesions:  -most consistent with hemangiomas    Plan:  -return end of May for visit and discussion of adjuvant endocrine therapy     Jessika Morales MD   of Medicine  Division of  Hematology, Oncology and Transplantation    ---  30 minutes were spent on the date of the encounter performing chart review, history and exam, documentation, and further activities as noted above.      The longitudinal plan of care for the diagnosis(es)/condition(s) as documented were addressed during this visit. Due to the added complexity in care, I will continue to support Phoebe in the subsequent management and with ongoing continuity of care.

## 2025-04-10 NOTE — TELEPHONE ENCOUNTER
FUTURE VISIT INFORMATION      FUTURE VISIT INFORMATION:  Date: 4/29/25  Time: 8:00  Location: [facility]  REFERRAL INFORMATION:  Referring provider:  Isi Larios MD   Referring providers clinic:   ONCOLOGY ADULT   Reason for visit/diagnosis  C50.811, Z17.0 (ICD-10-CM) - Malignant neoplasm of overlapping sites of right breast in female, estrogen receptor positive (H)     RECORDS REQUESTED FROM:       Clinic name Comments Records Status Imaging Status    Oncology OV 4/3/25-Dr. Larios Internal

## 2025-04-25 ENCOUNTER — LAB (OUTPATIENT)
Dept: LAB | Facility: CLINIC | Age: 40
End: 2025-04-25
Payer: COMMERCIAL

## 2025-04-25 DIAGNOSIS — C50.911 MALIGNANT NEOPLASM OF RIGHT BREAST IN FEMALE, ESTROGEN RECEPTOR POSITIVE, UNSPECIFIED SITE OF BREAST (H): ICD-10-CM

## 2025-04-25 DIAGNOSIS — Z17.0 MALIGNANT NEOPLASM OF RIGHT BREAST IN FEMALE, ESTROGEN RECEPTOR POSITIVE, UNSPECIFIED SITE OF BREAST (H): ICD-10-CM

## 2025-04-25 LAB
INTERPRETATION SERPL IEP-IMP: NORMAL
INTERPRETATION SERPL IEP-IMP: NORMAL
LAB PDF RESULT: NORMAL
LAB PDF RESULT: NORMAL
LAB TEST RESULTS REPORTED IN RPTPERIOD: NORMAL
LAB TEST RESULTS REPORTED IN RPTPERIOD: NORMAL
SEQUENCING METHOD PNL BLD/T: NORMAL
SEQUENCING METHOD PNL BLD/T: NORMAL
SPECIMEN TYPE: NORMAL
SPECIMEN TYPE: NORMAL

## 2025-04-28 ENCOUNTER — LAB (OUTPATIENT)
Dept: LAB | Facility: CLINIC | Age: 40
End: 2025-04-28
Payer: COMMERCIAL

## 2025-04-28 DIAGNOSIS — C50.911 MALIGNANT NEOPLASM OF RIGHT BREAST IN FEMALE, ESTROGEN RECEPTOR POSITIVE, UNSPECIFIED SITE OF BREAST (H): Primary | ICD-10-CM

## 2025-04-28 DIAGNOSIS — Z17.0 MALIGNANT NEOPLASM OF RIGHT BREAST IN FEMALE, ESTROGEN RECEPTOR POSITIVE, UNSPECIFIED SITE OF BREAST (H): Primary | ICD-10-CM

## 2025-04-28 PROCEDURE — 81162 BRCA1&2 GEN FULL SEQ DUP/DEL: CPT | Performed by: HOSPITALIST

## 2025-04-29 ENCOUNTER — PRE VISIT (OUTPATIENT)
Dept: PLASTIC SURGERY | Facility: CLINIC | Age: 40
End: 2025-04-29
Payer: COMMERCIAL

## 2025-04-29 LAB — INTERPRETATION SERPL IEP-IMP: NORMAL

## 2025-05-05 ENCOUNTER — OFFICE VISIT (OUTPATIENT)
Dept: SURGERY | Facility: CLINIC | Age: 40
End: 2025-05-05
Payer: COMMERCIAL

## 2025-05-05 ENCOUNTER — ANESTHESIA EVENT (OUTPATIENT)
Dept: SURGERY | Facility: AMBULATORY SURGERY CENTER | Age: 40
End: 2025-05-05
Payer: COMMERCIAL

## 2025-05-05 VITALS
OXYGEN SATURATION: 98 % | BODY MASS INDEX: 24.77 KG/M2 | RESPIRATION RATE: 16 BRPM | TEMPERATURE: 97.4 F | WEIGHT: 182.9 LBS | SYSTOLIC BLOOD PRESSURE: 119 MMHG | DIASTOLIC BLOOD PRESSURE: 81 MMHG | HEART RATE: 71 BPM | HEIGHT: 72 IN

## 2025-05-05 DIAGNOSIS — Z17.0 MALIGNANT NEOPLASM OF OVERLAPPING SITES OF RIGHT BREAST IN FEMALE, ESTROGEN RECEPTOR POSITIVE (H): ICD-10-CM

## 2025-05-05 DIAGNOSIS — C50.811 MALIGNANT NEOPLASM OF OVERLAPPING SITES OF RIGHT BREAST IN FEMALE, ESTROGEN RECEPTOR POSITIVE (H): ICD-10-CM

## 2025-05-05 DIAGNOSIS — Z01.818 PREOP EXAMINATION: Primary | ICD-10-CM

## 2025-05-05 PROCEDURE — 99203 OFFICE O/P NEW LOW 30 MIN: CPT | Performed by: PHYSICIAN ASSISTANT

## 2025-05-05 ASSESSMENT — LIFESTYLE VARIABLES: TOBACCO_USE: 0

## 2025-05-05 ASSESSMENT — ENCOUNTER SYMPTOMS: SEIZURES: 0

## 2025-05-05 ASSESSMENT — PAIN SCALES - GENERAL: PAINLEVEL_OUTOF10: NO PAIN (0)

## 2025-05-05 NOTE — PATIENT INSTRUCTIONS
Preparing for Your Surgery      Name:  Phoebe Alva   MRN:  [id number]   :  1985   Today's Date:  2025       [employer] [place] [employer] [address] [employer] Project                                Timeline 2025 -2025    This project will affect [facility], as well as the [facility].    Please check the [facility] [address] project website for the most up to date information and give yourself additional time to reach your destination.      Arriving for surgery:  Surgery date:  25  Arrival time:  7.45AM    Restrictions due to COVID 19:    Please maintain social distance.  Masking is optional.      parking is available for anyone with mobility limitations or disabilities. (Monday- Friday 7 am- 5 pm)    Please come to:    [facility]  [address] [place] [phone]    Please check in on the 5th floor at the Ambulatory Surgery Center.      What can I eat or drink?    -  You may eat and drink normally until 8 hours prior to arrival  time. (Until 11.45PM)  -  You may have clear liquids until 2 hours prior to arrival  time. (Until 5.45AM)    Examples of clear liquids:  Water  Clear broth  Juices (apple, white grape, white cranberry  and cider) without pulp  Noncarbonated, powder based beverages  (lemonade and Chito-Aid)  Sodas (Sprite, 7-Up, ginger ale and seltzer)  Coffee or tea (without milk or cream)  Gatorade      Which medicines can I take?    Hold Aspirin for 7 days before surgery.   Hold Multivitamins for 7 days before surgery.  Hold Supplements for 7 days before surgery.  Hold Ibuprofen (Advil, Motrin) for 1 day before surgery--unless otherwise directed by surgeon.  Hold Naproxen (Aleve) for 4 days before surgery.    No alcohol or cannabis products for 24 hours prior to procedure.      -  DO NOT take the following medications the day of surgery:  None     -  PLEASE TAKE the  following medications the day of surgery:   None     How do I prepare myself?  - Please take 2 showers (one the night prior to surgery and one the morning of surgery) using Scrubcare or Hibiclens soap.    Use this soap only from the neck to your toes. Avoid genital area      Leave the soap on your skin for one minute--then rinse thoroughly.      You may use your own shampoo and conditioner. No other hair products.   - Please remove all jewelry and body piercings.  - No lotions, deodorants or fragrance.  - No makeup or fingernail polish.   - Bring your ID and insurance card.    -If you have a Deep Brain Stimulator, a Spinal Cord Stimulator, or any implanted Neuro Device, you must bring the remote to the Surgery Center.         ALL PATIENTS ARE REQUIRED TO HAVE A RESPONSIBLE ADULT TO DRIVE AND BE IN ATTENDANCE WITH THEM FOR 24 HOURS FOLLOWING SURGERY.     Covid testing policy as of 12/06/2022  Your surgeon will notify and schedule you for a COVID test if one is needed before surgery--please direct any questions or COVID symptoms to your surgeon      Questions or Concerns:    -For questions regarding the day of surgery, please contact the Ambulatory Surgery Center at [phone].    -If you have health changes between today and your surgery, please contact your surgeon.     - For questions after surgery, please contact your surgeon's office.

## 2025-05-05 NOTE — H&P
"  Pre-Operative H & P     CC:  Preoperative exam to assess for increased cardiopulmonary risk while undergoing surgery and anesthesia.    Date of Encounter: 5/5/2025  Primary Care Physician:  Raven Sharma     Reason for visit:   Encounter Diagnoses   Name Primary?    Preop examination Yes    Malignant neoplasm of overlapping sites of right breast in female, estrogen receptor positive (H)        GRACE  Phoebe Alva is a 40 year old female who presents for pre-operative H & P in preparation for  Procedure Information       Case: [id number] Date/Time: 05/13/25 0915    Procedure: RIGHT Segmental Mastectomy (=\"Lumpectomy\"), RIGHT Axillary [place] Lymph Node Biopsy (Right: Breast)    Anesthesia type: General with Block    Diagnosis: Malignant neoplasm of overlapping sites of right breast in female, estrogen receptor positive (H) [C50.811, Z17.0]    Pre-op diagnosis: Malignant neoplasm of overlapping sites of right breast in female, estrogen receptor positive (H) [C50.811, Z17.0]    Location: [place] [address] / [facility]    Providers: Isi Larios MD            Ms. Alva has an unremarkable past medical history.  She discovered a palpable mass in her right breast last week of February with subsequent diagnostic mammogram and ultrasound showing a spiculated mass in the right upper outer quadrant.  Biopsy revealed invasive ductal carcinoma, grade 1 with DCIS (grade 1 cribriform type) with ER and CT strongly positive (both %) and hER2 2+ group 5 negative. One LN was negative for carcinoma.  The above surgery is now planned.    History is obtained from the patient and chart review    Hx of abnormal bleeding or anti-platelet use: Denies    Menstrual history: Patient's last menstrual period was 04/16/2025 (approximate).:      Past Medical History  Past Medical History:   Diagnosis Date    Cervical high risk HPV (human papillomavirus) test positive 11/29/2021    " 11/8/13 NIL 11/18/19 NIL pap, neg HPV 11/29/21 NIL pap, +HR HPV (not 16/18). Plan: cotest in 1 year       Past Surgical History  History reviewed. No pertinent surgical history.    Prior to Admission Medications  No current outpatient medications on file.       Allergies  No Known Allergies    Social History  Social History     Socioeconomic History    Marital status: Single     Spouse name: Not on file    Number of children: Not on file    Years of education: Not on file    Highest education level: Not on file   Occupational History    Not on file   Tobacco Use    Smoking status: Never     Passive exposure: Never    Smokeless tobacco: Never   Vaping Use    Vaping status: Never Used   Substance and Sexual Activity    Alcohol use: Yes     Alcohol/week: 5.0 standard drinks of alcohol     Types: 5 Glasses of wine per week     Comment: 1 glass wine, 5 days of week    Drug use: Never    Sexual activity: Yes     Partners: Male     Birth control/protection: Male Surgical     Comment: one male partner   Other Topics Concern    Parent/sibling w/ CABG, MI or angioplasty before 65F 55M? No   Social History Narrative    Has her own opera company, is a swimmer. Lives with her male partner, Ric.      Social Drivers of Health     Financial Resource Strain: Low Risk  (2/24/2025)    Financial Resource Strain     Within the past 12 months, have you or your family members you live with been unable to get utilities (heat, electricity) when it was really needed?: No   Food Insecurity: Low Risk  (2/24/2025)    Food Insecurity     Within the past 12 months, did you worry that your food would run out before you got money to buy more?: No     Within the past 12 months, did the food you bought just not last and you didn t have money to get more?: No   Transportation Needs: Low Risk  (2/24/2025)    Transportation Needs     Within the past 12 months, has lack of transportation kept you from medical appointments, getting your medicines,  non-medical meetings or appointments, work, or from getting things that you need?: No   Physical Activity: Sufficiently Active (2025)    Exercise Vital Sign     Days of Exercise per Week: 4 days     Minutes of Exercise per Session: 40 min   Stress: Stress Concern Present (2025)    [employer] [place] [employer] - Occupational Stress Questionnaire     Feeling of Stress : To some extent   Social Connections: Unknown (2025)    Social Connection and Isolation Panel [NHANES]     Frequency of Communication with Friends and Family: Not on file     Frequency of Social Gatherings with Friends and Family: Three times a week     Attends Hinduism Services: Not on file     Active Member of Clubs or Organizations: Not on file     Attends Club or Organization Meetings: Not on file     Marital Status: Not on file   Interpersonal Safety: Low Risk  (2025)    Interpersonal Safety     Do you feel physically and emotionally safe where you currently live?: Yes     Within the past 12 months, have you been hit, slapped, kicked or otherwise physically hurt by someone?: No     Within the past 12 months, have you been humiliated or emotionally abused in other ways by your partner or ex-partner?: No   Housing Stability: Low Risk  (2025)    Housing Stability     Do you have housing? : Yes     Are you worried about losing your housing?: No       Family History  Family History   Problem Relation Age of Onset    Breast Cancer Mother 66        stage 1, s/p radiation, lumpectomy    Ovarian Cancer Mother     Basal cell carcinoma Father     Hypertension Father     Melanoma Father         Skin cancer    No Known Problems Sister     Skin Cancer Paternal Grandfather     Melanoma Paternal Grandfather          from skin cancer    Anesthesia Reaction No family hx of     Deep Vein Thrombosis No family hx of        Review of Systems  The complete review of systems is negative other than noted in the HPI or here.  "    Anesthesia Evaluation   Pt has not had prior anesthetic         ROS/MED HX  ENT/Pulmonary:  - neg pulmonary ROS  (-) tobacco use   Neurologic:  - neg neurologic ROS  (-) no seizures and no CVA   Cardiovascular:  - neg cardiovascular ROS     METS/Exercise Tolerance: >4 METS    Hematologic:  - neg hematologic  ROS  (-) history of blood clots and history of blood transfusion   Musculoskeletal:  - neg musculoskeletal ROS     GI/Hepatic:  - neg GI/hepatic ROS     Renal/Genitourinary: Comment: Right ovarian cyst      Endo:  - neg endo ROS     Psychiatric/Substance Use:  - neg psychiatric ROS     Infectious Disease:  - neg infectious disease ROS     Malignancy:   (+) Malignancy, History of Breast.Breast CA Active status post.      Other:  - neg other ROS          /81 (BP Location: Right arm, Patient Position: Sitting, Cuff Size: Adult Regular)   Pulse 71   Temp 97.4  F (36.3  C) (Oral)   Resp 16   Ht 1.835 m (6' 0.25\")   Wt 83 kg (182 lb 14.4 oz)   LMP 04/16/2025 (Approximate)   SpO2 98%   Breastfeeding No   BMI 24.63 kg/m      Physical Exam   Constitutional: Awake, alert, cooperative, no apparent distress, and appears stated age.  Eyes: Pupils equal, round and reactive to light, extra ocular muscles intact, sclera clear, conjunctiva normal.  HENT: Normocephalic, oral pharynx with moist mucus membranes, good dentition. No goiter appreciated. Small tonsil stone on left tonsil.  Respiratory: Clear to auscultation bilaterally, no crackles or wheezing.  Cardiovascular: Regular rate and rhythm, normal S1 and S2, and no murmur noted. No edema. Palpable pulses to radia and PT arteries.   GI: soft, non-distended, non-tender  Lymph/Hematologic: No cervical lymphadenopathy and no supraclavicular lymphadenopathy.  Genitourinary:  deferred  Skin: Warm and dry.  No rashes at anticipated surgical site.   Musculoskeletal: Full ROM of neck. There is no redness, warmth, or swelling of the joints. Gross motor strength is " normal.    Neurologic: Awake, alert, oriented to name, place and time. Cranial nerves II-XII are grossly intact. Gait is normal.   Neuropsychiatric: Calm, cooperative. Normal affect.     Prior Labs/Diagnostic Studies   All labs and imaging personally reviewed     Component      Latest Ref Rng 1/9/2025  3:32 PM   Sodium      135 - 145 mmol/L 141    Potassium      3.4 - 5.3 mmol/L 4.2    Carbon Dioxide (CO2)      22 - 29 mmol/L 26    Anion Gap      7 - 15 mmol/L 10    Urea Nitrogen      6.0 - 20.0 mg/dL 9.5    Creatinine      0.51 - 0.95 mg/dL 0.74    GFR Estimate      >60 mL/min/1.73m2 >90    Calcium      8.8 - 10.4 mg/dL 9.0    Chloride      98 - 107 mmol/L 105    Glucose      70 - 99 mg/dL 92    Alkaline Phosphatase      40 - 150 U/L 70    AST      0 - 45 U/L 23    ALT      0 - 50 U/L 20    Protein Total      6.4 - 8.3 g/dL 7.3    Albumin      3.5 - 5.2 g/dL 4.6    Bilirubin Total      <=1.2 mg/dL 0.3        Component      Latest Ref Rng 1/9/2025  3:32 PM   WBC      4.0 - 11.0 10e3/uL 11.4 (H)    RBC Count      3.80 - 5.20 10e6/uL 4.69    Hemoglobin      11.7 - 15.7 g/dL 13.4    Hematocrit      35.0 - 47.0 % 40.4    MCV      78 - 100 fL 86    MCH      26.5 - 33.0 pg 28.6    MCHC      31.5 - 36.5 g/dL 33.2    RDW      10.0 - 15.0 % 12.1    Platelet Count      150 - 450 10e3/uL 222    % Neutrophils      % 83    % Lymphocytes      % 12    % Monocytes      % 4    % Eosinophils      % 1    % Basophils      % 0    % Immature Granulocytes      % 0    Absolute Neutrophil      1.6 - 8.3 10e3/uL 9.4 (H)    Absolute Lymphocytes      0.8 - 5.3 10e3/uL 1.4    Absolute Monocytes      0.0 - 1.3 10e3/uL 0.5    Absolute Eosinophils      0.0 - 0.7 10e3/uL 0.1    Absolute Basophils      0.0 - 0.2 10e3/uL 0.0    Absolute Immature Granulocytes      <=0.4 10e3/uL 0.0       Legend:  (H) High      EKG/ stress test - if available please see in ROS above       The patient's records and results personally reviewed by this provider.  "    Outside records reviewed from: Care Everywhere      Assessment    Phoebe Alva is a 40 year old female seen as a PAC referral for risk assessment and optimization for anesthesia.    Plan/Recommendations  Pt will be optimized for the proposed procedure.  See below for details on the assessment, risk, and preoperative recommendations    NEUROLOGY  - No history of TIA, CVA or seizure    -Post Op delirium risk factors:  No risk identified    ENT  - No current airway concerns.  Will need to be reassessed day of surgery.  Mallampati: I  TM: > 3    CARDIAC  - Denies cardiac history  - Denies chest pain, SOB, HICKMAN, palpitations    - METS (Metabolic Equivalents)  Patient performs 4 or more METS exercise without symptoms             Total Score: 0      RCRI-Very low risk: Class 1 0.4% complication rate             Total Score: 0        PULMONARY    SHERRILL Low Risk             Total Score: 0      - Denies asthma or inhaler use    - Tobacco History    History   Smoking Status    Never   Smokeless Tobacco    Never       GI  - Denies GERD  PONV High Risk  Total Score: 3           1 AN PONV: Pt is Female    1 AN PONV: Patient is not a current smoker    1 AN PONV: Intended Post Op Opioids      - CT A/P in January 2025 (ordered due to abdominal pain) showed enlarged mesenteric lymph nodes, mild splenomegaly, and 2 liver lesions most consistent with benign hemangiomas.  - Follow-up pelvic ultrasound showed likely endometrioma    /RENAL  - Baseline Creatinine 0.74    ENDOCRINE    - BMI: Estimated body mass index is 24.63 kg/m  as calculated from the following:    Height as of this encounter: 1.835 m (6' 0.25\").    Weight as of this encounter: 83 kg (182 lb 14.4 oz).  Overweight (BMI 25.0-29.9)  - No history of Diabetes Mellitus    HEME  VTE Medium Risk 1.8%             Total Score: 5    VTE: Current cancer      - No history of abnormal bleeding or antiplatelet use.      MSK  Patient is NOT Frail             Total Score: 0    "           Different anesthesia methods/types have been discussed with the patient, but they are aware that the final plan will be decided by the assigned anesthesia provider on the date of service.      The patient is optimized for their procedure. AVS with information on surgery time/arrival time, meds and NPO status given by nursing staff. No further diagnostic testing indicated.      On the day of service:     Prep time: 5 minutes  Visit time: 10 minutes  Documentation time: 10 minutes  ------------------------------------------  Total time: 30 minutes      Moni Arellano PA-C  Preoperative Assessment Center  [facility]  Clinic and Surgery Center  Phone: [phone]  Fax: [phone]

## 2025-05-06 DIAGNOSIS — C50.811 MALIGNANT NEOPLASM OF OVERLAPPING SITES OF RIGHT BREAST IN FEMALE, ESTROGEN RECEPTOR POSITIVE (H): ICD-10-CM

## 2025-05-06 DIAGNOSIS — Z17.0 MALIGNANT NEOPLASM OF OVERLAPPING SITES OF RIGHT BREAST IN FEMALE, ESTROGEN RECEPTOR POSITIVE (H): ICD-10-CM

## 2025-05-06 DIAGNOSIS — Z80.41 FAMILY HISTORY OF MALIGNANT NEOPLASM OF OVARY: ICD-10-CM

## 2025-05-06 DIAGNOSIS — Z80.3 FAMILY HISTORY OF MALIGNANT NEOPLASM OF BREAST: ICD-10-CM

## 2025-05-06 PROCEDURE — G0452 MOLECULAR PATHOLOGY INTERPR: HCPCS | Mod: 26 | Performed by: STUDENT IN AN ORGANIZED HEALTH CARE EDUCATION/TRAINING PROGRAM

## 2025-05-12 RX ORDER — ONDANSETRON 2 MG/ML
4 INJECTION INTRAMUSCULAR; INTRAVENOUS EVERY 30 MIN PRN
Status: CANCELLED | OUTPATIENT
Start: 2025-05-12

## 2025-05-12 RX ORDER — DEXAMETHASONE SODIUM PHOSPHATE 10 MG/ML
4 INJECTION, SOLUTION INTRAMUSCULAR; INTRAVENOUS
Status: CANCELLED | OUTPATIENT
Start: 2025-05-12

## 2025-05-12 RX ORDER — ONDANSETRON 4 MG/1
4 TABLET, ORALLY DISINTEGRATING ORAL EVERY 30 MIN PRN
Status: CANCELLED | OUTPATIENT
Start: 2025-05-12

## 2025-05-12 RX ORDER — NALOXONE HYDROCHLORIDE 0.4 MG/ML
0.1 INJECTION, SOLUTION INTRAMUSCULAR; INTRAVENOUS; SUBCUTANEOUS
Status: CANCELLED | OUTPATIENT
Start: 2025-05-12

## 2025-05-13 ENCOUNTER — HOSPITAL ENCOUNTER (OUTPATIENT)
Facility: AMBULATORY SURGERY CENTER | Age: 40
Discharge: HOME | End: 2025-05-13
Attending: SURGERY
Payer: COMMERCIAL

## 2025-05-13 ENCOUNTER — HOSPITAL ENCOUNTER (OUTPATIENT)
Dept: NUCLEAR MEDICINE | Facility: CLINIC | Age: 40
Setting detail: NUCLEAR MEDICINE
Discharge: HOME | End: 2025-05-13
Attending: SURGERY
Payer: COMMERCIAL

## 2025-05-13 ENCOUNTER — ANCILLARY PROCEDURE (OUTPATIENT)
Dept: MAMMOGRAPHY | Facility: CLINIC | Age: 40
End: 2025-05-13
Attending: SURGERY
Payer: COMMERCIAL

## 2025-05-13 ENCOUNTER — ANESTHESIA (OUTPATIENT)
Dept: SURGERY | Facility: AMBULATORY SURGERY CENTER | Age: 40
End: 2025-05-13
Payer: COMMERCIAL

## 2025-05-13 VITALS
DIASTOLIC BLOOD PRESSURE: 69 MMHG | RESPIRATION RATE: 16 BRPM | TEMPERATURE: 98.2 F | HEIGHT: 72 IN | HEART RATE: 58 BPM | BODY MASS INDEX: 24.65 KG/M2 | SYSTOLIC BLOOD PRESSURE: 103 MMHG | OXYGEN SATURATION: 97 % | WEIGHT: 182 LBS

## 2025-05-13 DIAGNOSIS — C50.811 MALIGNANT NEOPLASM OF OVERLAPPING SITES OF RIGHT BREAST IN FEMALE, ESTROGEN RECEPTOR POSITIVE (H): ICD-10-CM

## 2025-05-13 DIAGNOSIS — Z17.0 MALIGNANT NEOPLASM OF OVERLAPPING SITES OF RIGHT BREAST IN FEMALE, ESTROGEN RECEPTOR POSITIVE (H): Primary | ICD-10-CM

## 2025-05-13 DIAGNOSIS — C50.811 MALIGNANT NEOPLASM OF OVERLAPPING SITES OF RIGHT BREAST IN FEMALE, ESTROGEN RECEPTOR POSITIVE (H): Primary | ICD-10-CM

## 2025-05-13 DIAGNOSIS — Z17.0 MALIGNANT NEOPLASM OF OVERLAPPING SITES OF RIGHT BREAST IN FEMALE, ESTROGEN RECEPTOR POSITIVE (H): ICD-10-CM

## 2025-05-13 LAB
HCG UR QL: NEGATIVE
INTERNAL QC OK POCT: NORMAL
POCT KIT EXPIRATION DATE: NORMAL
POCT KIT LOT NUMBER: NORMAL

## 2025-05-13 PROCEDURE — 99207 NM LYMPHOSCINTIGRAPHY INJECTION ONLY: CPT | Performed by: RADIOLOGY

## 2025-05-13 PROCEDURE — 88342 IMHCHEM/IMCYTCHM 1ST ANTB: CPT | Mod: 26 | Performed by: PATHOLOGY

## 2025-05-13 PROCEDURE — A9520 TC99 TILMANOCEPT DIAG 0.5MCI: HCPCS | Performed by: RADIOLOGY

## 2025-05-13 PROCEDURE — 38792 RA TRACER ID OF SENTINL NODE: CPT

## 2025-05-13 PROCEDURE — 88341 IMHCHEM/IMCYTCHM EA ADD ANTB: CPT | Mod: 26 | Performed by: PATHOLOGY

## 2025-05-13 PROCEDURE — 88360 TUMOR IMMUNOHISTOCHEM/MANUAL: CPT | Mod: 26 | Performed by: PATHOLOGY

## 2025-05-13 PROCEDURE — 88307 TISSUE EXAM BY PATHOLOGIST: CPT | Mod: 26 | Performed by: PATHOLOGY

## 2025-05-13 PROCEDURE — 88360 TUMOR IMMUNOHISTOCHEM/MANUAL: CPT | Mod: TC | Performed by: SURGERY

## 2025-05-13 RX ORDER — DEXAMETHASONE SODIUM PHOSPHATE 4 MG/ML
INJECTION, SOLUTION INTRA-ARTICULAR; INTRALESIONAL; INTRAMUSCULAR; INTRAVENOUS; SOFT TISSUE PRN
Status: DISCONTINUED | OUTPATIENT
Start: 2025-05-13 | End: 2025-05-13

## 2025-05-13 RX ORDER — FENTANYL CITRATE 50 UG/ML
50 INJECTION, SOLUTION INTRAMUSCULAR; INTRAVENOUS EVERY 5 MIN PRN
Status: DISCONTINUED | OUTPATIENT
Start: 2025-05-13 | End: 2025-05-14 | Stop reason: HOSPADM

## 2025-05-13 RX ORDER — NALOXONE HYDROCHLORIDE 0.4 MG/ML
0.2 INJECTION, SOLUTION INTRAMUSCULAR; INTRAVENOUS; SUBCUTANEOUS
Status: DISCONTINUED | OUTPATIENT
Start: 2025-05-13 | End: 2025-05-14 | Stop reason: HOSPADM

## 2025-05-13 RX ORDER — LIDOCAINE HYDROCHLORIDE 20 MG/ML
INJECTION, SOLUTION INFILTRATION; PERINEURAL PRN
Status: DISCONTINUED | OUTPATIENT
Start: 2025-05-13 | End: 2025-05-13

## 2025-05-13 RX ORDER — ACETAMINOPHEN 325 MG/1
975 TABLET ORAL ONCE
Status: COMPLETED | OUTPATIENT
Start: 2025-05-13 | End: 2025-05-13

## 2025-05-13 RX ORDER — NALOXONE HYDROCHLORIDE 0.4 MG/ML
0.1 INJECTION, SOLUTION INTRAMUSCULAR; INTRAVENOUS; SUBCUTANEOUS
Status: DISCONTINUED | OUTPATIENT
Start: 2025-05-13 | End: 2025-05-14 | Stop reason: HOSPADM

## 2025-05-13 RX ORDER — SODIUM CHLORIDE, SODIUM LACTATE, POTASSIUM CHLORIDE, CALCIUM CHLORIDE 600; 310; 30; 20 MG/100ML; MG/100ML; MG/100ML; MG/100ML
INJECTION, SOLUTION INTRAVENOUS CONTINUOUS
Status: DISCONTINUED | OUTPATIENT
Start: 2025-05-13 | End: 2025-05-14 | Stop reason: HOSPADM

## 2025-05-13 RX ORDER — ONDANSETRON 2 MG/ML
INJECTION INTRAMUSCULAR; INTRAVENOUS PRN
Status: DISCONTINUED | OUTPATIENT
Start: 2025-05-13 | End: 2025-05-13

## 2025-05-13 RX ORDER — FLUMAZENIL 0.1 MG/ML
0.2 INJECTION, SOLUTION INTRAVENOUS
Status: DISCONTINUED | OUTPATIENT
Start: 2025-05-13 | End: 2025-05-14 | Stop reason: HOSPADM

## 2025-05-13 RX ORDER — MAGNESIUM HYDROXIDE 1200 MG/15ML
LIQUID ORAL PRN
Status: DISCONTINUED | OUTPATIENT
Start: 2025-05-13 | End: 2025-05-13 | Stop reason: HOSPADM

## 2025-05-13 RX ORDER — OXYCODONE HYDROCHLORIDE 5 MG/1
10 TABLET ORAL
Status: DISCONTINUED | OUTPATIENT
Start: 2025-05-13 | End: 2025-05-14 | Stop reason: HOSPADM

## 2025-05-13 RX ORDER — NALOXONE HYDROCHLORIDE 0.4 MG/ML
0.4 INJECTION, SOLUTION INTRAMUSCULAR; INTRAVENOUS; SUBCUTANEOUS
Status: DISCONTINUED | OUTPATIENT
Start: 2025-05-13 | End: 2025-05-14 | Stop reason: HOSPADM

## 2025-05-13 RX ORDER — FENTANYL CITRATE 50 UG/ML
25-50 INJECTION, SOLUTION INTRAMUSCULAR; INTRAVENOUS
Status: DISCONTINUED | OUTPATIENT
Start: 2025-05-13 | End: 2025-05-14 | Stop reason: HOSPADM

## 2025-05-13 RX ORDER — HYDROMORPHONE HYDROCHLORIDE 1 MG/ML
0.2 INJECTION, SOLUTION INTRAMUSCULAR; INTRAVENOUS; SUBCUTANEOUS EVERY 5 MIN PRN
Status: DISCONTINUED | OUTPATIENT
Start: 2025-05-13 | End: 2025-05-14 | Stop reason: HOSPADM

## 2025-05-13 RX ORDER — HYDROMORPHONE HYDROCHLORIDE 1 MG/ML
0.4 INJECTION, SOLUTION INTRAMUSCULAR; INTRAVENOUS; SUBCUTANEOUS EVERY 5 MIN PRN
Status: DISCONTINUED | OUTPATIENT
Start: 2025-05-13 | End: 2025-05-14 | Stop reason: HOSPADM

## 2025-05-13 RX ORDER — ISOSULFAN BLUE 50 MG/5ML
INJECTION, SOLUTION SUBCUTANEOUS PRN
Status: DISCONTINUED | OUTPATIENT
Start: 2025-05-13 | End: 2025-05-13 | Stop reason: HOSPADM

## 2025-05-13 RX ORDER — CEFAZOLIN SODIUM 2 G/50ML
2 SOLUTION INTRAVENOUS SEE ADMIN INSTRUCTIONS
Status: DISCONTINUED | OUTPATIENT
Start: 2025-05-13 | End: 2025-05-14 | Stop reason: HOSPADM

## 2025-05-13 RX ORDER — LIDOCAINE 40 MG/G
CREAM TOPICAL
Status: DISCONTINUED | OUTPATIENT
Start: 2025-05-13 | End: 2025-05-14 | Stop reason: HOSPADM

## 2025-05-13 RX ORDER — OXYCODONE HYDROCHLORIDE 5 MG/1
5 TABLET ORAL
Status: DISCONTINUED | OUTPATIENT
Start: 2025-05-13 | End: 2025-05-14 | Stop reason: HOSPADM

## 2025-05-13 RX ORDER — FENTANYL CITRATE 50 UG/ML
25 INJECTION, SOLUTION INTRAMUSCULAR; INTRAVENOUS EVERY 5 MIN PRN
Status: DISCONTINUED | OUTPATIENT
Start: 2025-05-13 | End: 2025-05-14 | Stop reason: HOSPADM

## 2025-05-13 RX ORDER — CEFAZOLIN SODIUM 2 G/50ML
2 SOLUTION INTRAVENOUS
Status: COMPLETED | OUTPATIENT
Start: 2025-05-13 | End: 2025-05-13

## 2025-05-13 RX ORDER — ONDANSETRON 4 MG/1
4 TABLET, ORALLY DISINTEGRATING ORAL EVERY 30 MIN PRN
Status: DISCONTINUED | OUTPATIENT
Start: 2025-05-13 | End: 2025-05-14 | Stop reason: HOSPADM

## 2025-05-13 RX ORDER — PROPOFOL 10 MG/ML
INJECTION, EMULSION INTRAVENOUS CONTINUOUS PRN
Status: DISCONTINUED | OUTPATIENT
Start: 2025-05-13 | End: 2025-05-13

## 2025-05-13 RX ORDER — ONDANSETRON 2 MG/ML
4 INJECTION INTRAMUSCULAR; INTRAVENOUS EVERY 30 MIN PRN
Status: DISCONTINUED | OUTPATIENT
Start: 2025-05-13 | End: 2025-05-14 | Stop reason: HOSPADM

## 2025-05-13 RX ORDER — DEXAMETHASONE SODIUM PHOSPHATE 10 MG/ML
4 INJECTION, SOLUTION INTRAMUSCULAR; INTRAVENOUS
Status: DISCONTINUED | OUTPATIENT
Start: 2025-05-13 | End: 2025-05-14 | Stop reason: HOSPADM

## 2025-05-13 RX ORDER — SODIUM CHLORIDE, SODIUM LACTATE, POTASSIUM CHLORIDE, CALCIUM CHLORIDE 600; 310; 30; 20 MG/100ML; MG/100ML; MG/100ML; MG/100ML
INJECTION, SOLUTION INTRAVENOUS CONTINUOUS PRN
Status: DISCONTINUED | OUTPATIENT
Start: 2025-05-13 | End: 2025-05-13

## 2025-05-13 RX ORDER — PROPOFOL 10 MG/ML
INJECTION, EMULSION INTRAVENOUS PRN
Status: DISCONTINUED | OUTPATIENT
Start: 2025-05-13 | End: 2025-05-13

## 2025-05-13 RX ORDER — FENTANYL CITRATE 50 UG/ML
INJECTION, SOLUTION INTRAMUSCULAR; INTRAVENOUS PRN
Status: DISCONTINUED | OUTPATIENT
Start: 2025-05-13 | End: 2025-05-13

## 2025-05-13 RX ORDER — BUPIVACAINE HCL/EPINEPHRINE 0.25-.0005
VIAL (ML) INJECTION
Status: COMPLETED | OUTPATIENT
Start: 2025-05-13 | End: 2025-05-13

## 2025-05-13 RX ADMIN — SODIUM CHLORIDE, SODIUM LACTATE, POTASSIUM CHLORIDE, CALCIUM CHLORIDE: 600; 310; 30; 20 INJECTION, SOLUTION INTRAVENOUS at 08:35

## 2025-05-13 RX ADMIN — Medication 20 ML: at 09:08

## 2025-05-13 RX ADMIN — Medication 0.5 MG: at 10:40

## 2025-05-13 RX ADMIN — LIDOCAINE HYDROCHLORIDE 80 MG: 20 INJECTION, SOLUTION INFILTRATION; PERINEURAL at 09:28

## 2025-05-13 RX ADMIN — FENTANYL CITRATE 50 MCG: 50 INJECTION, SOLUTION INTRAMUSCULAR; INTRAVENOUS at 08:59

## 2025-05-13 RX ADMIN — PROPOFOL 50 MG: 10 INJECTION, EMULSION INTRAVENOUS at 09:34

## 2025-05-13 RX ADMIN — CEFAZOLIN SODIUM 2 G: 2 SOLUTION INTRAVENOUS at 09:21

## 2025-05-13 RX ADMIN — Medication 100 MCG: at 09:55

## 2025-05-13 RX ADMIN — PROPOFOL 150 MCG/KG/MIN: 10 INJECTION, EMULSION INTRAVENOUS at 09:31

## 2025-05-13 RX ADMIN — PROPOFOL 200 MG: 10 INJECTION, EMULSION INTRAVENOUS at 09:28

## 2025-05-13 RX ADMIN — ONDANSETRON 4 MG: 2 INJECTION INTRAMUSCULAR; INTRAVENOUS at 11:10

## 2025-05-13 RX ADMIN — Medication 100 MCG: at 10:06

## 2025-05-13 RX ADMIN — FENTANYL CITRATE 100 MCG: 50 INJECTION, SOLUTION INTRAMUSCULAR; INTRAVENOUS at 09:28

## 2025-05-13 RX ADMIN — SODIUM CHLORIDE, SODIUM LACTATE, POTASSIUM CHLORIDE, CALCIUM CHLORIDE: 600; 310; 30; 20 INJECTION, SOLUTION INTRAVENOUS at 11:10

## 2025-05-13 RX ADMIN — ACETAMINOPHEN 975 MG: 325 TABLET ORAL at 08:33

## 2025-05-13 RX ADMIN — DEXAMETHASONE SODIUM PHOSPHATE 4 MG: 4 INJECTION, SOLUTION INTRA-ARTICULAR; INTRALESIONAL; INTRAMUSCULAR; INTRAVENOUS; SOFT TISSUE at 09:40

## 2025-05-13 NOTE — ANESTHESIA CARE TRANSFER NOTE
"  Patient: Phoebe Alva    Procedure: Procedure(s):  RIGHT Segmental Mastectomy (=\"Lumpectomy\"), RIGHT Axillary [place] Lymph Node Biopsy       Diagnosis: Malignant neoplasm of overlapping sites of right breast in female, estrogen receptor positive (H) [C50.811, Z17.0]  Diagnosis Additional Information: No value filed.    Anesthesia Type:   General     Note:    Oropharynx: oropharynx clear of all foreign objects and spontaneously breathing  Level of Consciousness: awake and drowsy  Oxygen Supplementation: nasal cannula  Level of Supplemental Oxygen (L/min / FiO2): 3  Independent Airway: airway patency satisfactory and stable  Dentition: dentition unchanged  Vital Signs Stable: post-procedure vital signs reviewed and stable  Report to RN Given: handoff report given  Patient transferred to: PACU    Handoff Report: Identifed the Patient, Identified the Reponsible Provider, Reviewed the pertinent medical history, Discussed the surgical course, Reviewed Intra-OP anesthesia mangement and issues during anesthesia, Set expectations for post-procedure period and Allowed opportunity for questions and acknowledgement of understanding      Vitals:  Vitals Value Taken Time   BP 90/53    Temp 36.5  C (97.7  F) 05/13/25 11:31   Pulse 63 05/13/25 11:31   Resp 9 05/13/25 11:31   SpO2 98 % 05/13/25 11:31   Vitals shown include unfiled device data.    Electronically Signed By: RAHUL Hurd CRNA  May 13, 2025  11:32 AM  "

## 2025-05-13 NOTE — ANESTHESIA PROCEDURE NOTES
Airway       Patient location during procedure: OR  Staff -        CRNA: Onelia Wang APRN CRNA       Performed By: CRNAIndications and Patient Condition       Indications for airway management: brandi-procedural       Induction type:intravenous       Mask difficulty assessment: 0 - not attempted    Final Airway Details       Final airway type: supraglottic airway    Supraglottic Airway Details        Type: LMA       Brand: LMA Unique       LMA size: 4    Post intubation assessment        Placement verified by: capnometry and chest rise        Number of attempts at approach: 1       Number of other approaches attempted: 0       Secured with: tape       Ease of procedure: easy       Dentition: Intact and Unchanged

## 2025-05-13 NOTE — DISCHARGE INSTRUCTIONS
"[facility]  Home Care Following Anesthesia  For 24 hours after surgery:  Get plenty of rest.  A responsible adult must stay with you for at least 24 hours after you leave the surgery center.  Do not drive or use heavy equipment.  If you have weakness or tingling, don't drive or use heavy equipment until this feeling goes away.   Do not drink alcohol.   Avoid strenuous or risky activities.  Ask for help when climbing stairs.  You may feel lightheaded.  IF so, sit for a few minutes before standing.  Have someone help you get up.   If you have nausea (feel sick to your stomach): Drink only clear liquids such as apple juice, ginger ale, broth or 7-Up.  Rest may also help.  Be sure to drink enough fluids.  Move to a regular diet as you feel able.   You may have a slight fever.  Call the doctor if your fever is over 100 F (37.7 C) (taken under the tongue) or lasts longer than 24 hours.  You may have a dry mouth, a sore throat, muscle aches or trouble sleeping. These should go away after 24 hours.  Do not make important or legal decisions.   It is recommended to avoid smoking.        Today you received an Exparel block to numb the nerves near your surgery site.  This is a block using local anesthetic or \"numbing\" medication injected around the nerves to anesthetize or \"numb\" the area supplied by those nerves.  This block is injected into the muscle layer near your surgical site.  This medication may numb the location where you had surgery up to 72 hours.  If your surgical site is an arm or leg you should be careful with your affected limb, since it is possible to injure your limb without being aware of it due to the numbing.  Until full feeling returns, you should guard against bumping or hitting your limb, and avoid extreme hot or cold temperatures on the skin.  As the block wears off, the feeling will return as a tingling or prickly sensation near your surgical site.  You will " experince more discomfort from your incision as the feeling returns.  You may want to take a pain pill (a narcotic or Tylenol if this was prescribed by your surgeon) when you start to experience mild pain before the pain beomes more severe.  If your pain medications do not control your pain, you should notify your surgeon.    Tips for taking pain medications  To get the best pain relief possible, remember these points:  Take pain medications as directed, before pain becomes severe.  Pain medication can upset your stomach: taking it with food may help.  Constipation is a common side effect of pain medication. Drink plenty of  fluids.  Eat foods high in fiber. Take a stool softener if recommended by your doctor or pharmacist.  Do not drink alcohol, drive or operate machinery while taking pain medications.  Ask about other ways to control pain, such as with heat, ice or relaxation.    Tylenol/Acetaminophen Consumption    If you feel your pain relief is insufficient, you may take Tylenol/Acetaminophen in addition to your narcotic pain medication.   Be careful not to exceed 4,000 mg of Tylenol/Acetaminophen in a 24 hour period from all sources.  If you are taking extra strength Tylenol/acetaminophen (500 mg), the maximum dose is 8 tablets in 24 hours.  If you are taking regular strength acetaminophen (325 mg), the maximum dose is 12 tablets in 24 hours.  Tylenol 975 mg given at 8:30am.   Ok to take more after 2:30pm.      Call a doctor for any of the following:  Signs of infection (fever, growing tenderness at the surgery site, a large amount of drainage or bleeding, severe pain, foul-smelling drainage, redness, swelling).  It has been over 8 to 10 hours since surgery and you are still not able to urinate (pass water).  Headache for over 24 hours.  Numbness, tingling or weakness the day after surgery (if you had spinal anesthesia).  Signs of Covid-19 infection (temperature over 100 degrees, shortness of breath, cough,  loss of taste/smell, generalized body aches, persistent headache, chills, sore throat, nausea/vomiting/diarrhea)    Your doctor is:  Dr. Isi Larios, Breast Center: [phone]                  After hours and weekends call the hospital @ [phone] and ask for the resident on call for:  [facility]  For emergency care, call the:  [facility]:  [phone] (TTY for hearing impaired: [phone])

## 2025-05-13 NOTE — ANESTHESIA PROCEDURE NOTES
Pectoralis Procedure Note    Pre-Procedure   Staff -        Anesthesiologist:  Jai Justin MD       Resident/Fellow: Davey Lake MD       Performed By: resident and with residents       Procedure performed by resident/fellow/CRNA in presence of a teaching physician.         Location: pre-op       Pre-Anesthestic Checklist: patient identified, IV checked, site marked, risks and benefits discussed, informed consent, monitors and equipment checked, pre-op evaluation, at physician/surgeon's request and post-op pain management  Timeout:       Correct Patient: Yes        Correct Procedure: Yes        Correct Site: Yes        Correct Position: Yes        Correct Laterality: Yes        Site Marked: Yes  Procedure Documentation  Procedure: Pectoralis  Pectoralis I and Pectoralis II thoracic plane block         Laterality: right       Patient Position: sitting       Patient Prep/Sterile Barriers: sterile gloves, mask       Skin prep: Chloraprep       Needle Type: insulated       Needle Gauge: 21.        Needle Length (millimeters): 110        Ultrasound guided       1. Ultrasound was used to identify targeted nerve, plexus, vascular marker, or fascial plane and place a needle adjacent to it in real-time.       2. Ultrasound was used to visualize the spread of anesthetic in close proximity to the above referenced structure.       3. A permanent image is entered into the patient's record.       4. The visualized anatomic structures appeared normal.       5. There were no apparent abnormal pathologic findings.    Assessment/Narrative         The placement was negative for: blood aspirated, painful injection and site bleeding       Paresthesias: No.       Bolus given via needle..        Secured via.        Insertion/Infusion Method: Single Shot       Complications: none       Injection made incrementally with aspirations every 5 mL.    Medication(s) Administered   Bupivacaine 0.25% w/ 1:200K Epi (Injection) - Injection   20  "mL - 5/13/2025 9:08:00 AM  Bupivacaine liposome (Exparel) 1.3% LA inj susp (Infiltration) - Infiltration   10 mL - 5/13/2025 9:08:00 AM   Comments:  PECS 2 - 15cc of 0.25% bupi and 5cc of exparel  PECS 1 - 5cc of 0.25% bupi and 5cc of exparel  133mg of exparel in total       FOR [facility] ([facility]) ONLY:   Pain Team Contact information: please page the Pain Team Via [place]. Search \"Pain\". During daytime hours, please page the attending first. At night please page the resident first.      "

## 2025-05-13 NOTE — OP NOTE
PATIENT NAME:  Phoebe Alva  PATIENT YOB: 1985    DATE OF SURGERY: May 13, 2025     SURGEON: Isi Larios MD  ASSISTANT(S): None    PREOPERATIVE DIAGNOSIS: Right breast cancer upper outer quadrant  POSTOPERATIVE DIAGNOSIS: same    PROCEDURE(S):   Right axillary sentinel lymph node mapping and biopsy  Right segmental mastectomy    ANESTHESIA: General + Block    CLINICAL NOTE:  Phoebe Alva is a 40 year old woman with a right breast cancer.  The risks and benefits of a right segmental mastectomy and axillary jonathon staging were discussed with the patient and she elected to proceed with informed consent.    OPERATIVE FINDINGS:  1. Four sentinel lymph nodes removed.  2. Palpable mass excised.  Specimen radiograph confirmed presence of the mass and biopsy clip within the specimen.     OPERATIVE PROCEDURE:  The [facility] subareolar injection of technetium-labelled Tilmanocept.  The patient was brought to the operating room and placed in the supine position with appropriate padding for all of the pressure points.  A general anesthetic was administered by the Anesthesia team.  A surgical safety checklist was performed to confirm the patient's identity, the site and laterality of the procedure. Perioperative antibiotics (Ancef) was provided.  VTE prophylaxis was provided with serial compression devices. 2.5 mL of lymphazurin was injected into the right  subareolar space and the right  breast was massaged for 5 minutes.  The right  breast and axilla were then prepped and draped in the usual sterile fashion using Chloraprep.     We began with the axilla.  An incision was made just below the hair-bearing area of the right axilla.  The clavipectoral fascia was incised to enter the axilla. The Neoprobe was used to identify the sentinel lymph nodes.  [place] lymph node #1 was blue and had an ex vivo count of 6750.  [place] lymph node #2 was blue and had an ex vivo count of  66416.  [place] lymph node #3 was blue and had an ex vivo count of 79.   [place] lymph node #4 was blue and had an ex vivo count of 91195.  The background count was 147. No other blue or palpable lymph nodes were found.  Thus no additional sentinel lymph nodes were sought.  All of the lymph nodes were sent to pathology individually. The wound was irrigated and hemostasis was achieved.  Surgicell snow was placed. The incision was closed in two layers with interrupted 3-0 vicryl and a running subcuticular 4-0 monocryl.     Next, a curvilinear incision was made in the upper outer quadrant of the right breast.  Very superficial skin flaps were raised. The palpable breast mass was dissected out, with careful attention to resect the mass with a grossly normal margin of surrounding breast tissue. The dissection was carried out down to the pectoralis major fascia, taking the fascia en bloc with the specimen.  The specimen was inked and radiographed.  It was found to contain the biopsy clip and mass.  The specimen was then sent to pathology. The wound was irrigated and hemostasis was achieved. Hemoclips were placed to rio the edges of the cavity: medial, inferior, lateral, superior, and posterior (deep).   Surgicell snow was placed.Deep 3-0 vicryl sutures were placed to reapproximate the breast tissue to avoid a divot.  The incision was closed in two layers with interrupted 3-0 vicryl and a running subcuticular 4-0 monocryl. Steristrips and Primipore dressings were applied.   The patient tolerated the procedure well. The sponge, needle, instrument counts were correct.  The patient was then awakened and taken to recovery in stable fashion.     I was present and scrubbed for the entire above procedure.    EBL: 2 mL    SPECIMEN(S):  A. Right axillary sentinel lymph node # 1  B. Right axillary sentinel lymph node # 2  C. Right axillary sentinel lymph node # 3  D. Right axillary sentinel lymph node # 4  E. Right breast  mass    POSTOPERATIVE PLANS:  Phoebe Alva will be discharged home today with wound care instructions and no prescription for analgesics.  She will follow-up with me in 2 weeks.     [facility] ON CANCER SYNOPTIC REPORT:  [place] Node Biopsy for Breast Cancer - Right  Operation performed with curative intent Yes   Tracer(s) used to identify sentinel nodes in the upfront surgery (non-neoadjuvant) setting Dye and Radioactive tracer   Tracer(s) used to identify sentinel nodes in the neoadjuvant setting N/A   All nodes (colored or non-colored) present at the end of a dye-filled lymphatic channel were removed Yes   All significantly radioactive nodes were removed Yes   All palpably suspicious nodes were removed N/A   Biopsy-proven positive nodes marked with clips prior to chemotherapy were identified and removed N/A     Isi Larios MD MSc FRC FACS  Associate Professor of Surgery  Division of Surgical Oncology  [facility]

## 2025-05-13 NOTE — ANESTHESIA POSTPROCEDURE EVALUATION
"Patient: Phoebe Alva    Procedure: Procedure(s):  RIGHT Segmental Mastectomy (=\"Lumpectomy\"), RIGHT Axillary [place] Lymph Node Biopsy       Anesthesia Type:  General    Note:  Disposition: Outpatient   Postop Pain Control: Uneventful            Sign Out: Well controlled pain   PONV: No   Neuro/Psych: Uneventful            Sign Out: Acceptable/Baseline neuro status   Airway/Respiratory: Uneventful            Sign Out: Acceptable/Baseline resp. status   CV/Hemodynamics: Uneventful            Sign Out: Acceptable CV status   Other NRE: NONE   DID A NON-ROUTINE EVENT OCCUR? No           Last vitals:  Vitals Value Taken Time   BP 97/56 05/13/25 12:15   Temp 36.8  C (98.2  F) 05/13/25 12:15   Pulse 60 05/13/25 12:15   Resp 15 05/13/25 12:15   SpO2 95 % 05/13/25 12:15       Electronically Signed By: Dontae Ng MD  May 13, 2025  3:56 PM  "

## 2025-05-13 NOTE — ANESTHESIA PREPROCEDURE EVALUATION
"Anesthesia Pre-Procedure Evaluation    Patient: Phoebe Alva   MRN: [id number] : 1985          Procedure : Procedure(s):  RIGHT Segmental Mastectomy (=\"Lumpectomy\"), RIGHT Axillary [place] Lymph Node Biopsy         Past Medical History:   Diagnosis Date    Cervical high risk HPV (human papillomavirus) test positive 2021 NIL 11/18/19 NIL pap, neg HPV 21 NIL pap, +HR HPV (not 16/18). Plan: cotest in 1 year      History reviewed. No pertinent surgical history.   No Known Allergies   Social History     Tobacco Use    Smoking status: Never     Passive exposure: Never    Smokeless tobacco: Never   Substance Use Topics    Alcohol use: Yes     Alcohol/week: 5.0 standard drinks of alcohol     Types: 5 Glasses of wine per week     Comment: 1 glass wine, 5 days of week      Wt Readings from Last 1 Encounters:   25 82.6 kg (182 lb)        Anesthesia Evaluation   Pt has not had prior anesthetic         ROS/MED HX  ENT/Pulmonary: Comment: Chronic tonsillar hypertrophy      Neurologic:       Cardiovascular:       METS/Exercise Tolerance:     Hematologic:       Musculoskeletal:       GI/Hepatic:       Renal/Genitourinary:       Endo:       Psychiatric/Substance Use:       Infectious Disease:       Malignancy: Comment: Malignant neoplasm of overlapping sites of right breast in female, estrogen receptor positive (H)  (+) Malignancy, History of Breast.    Other:              Physical Exam  Airway  Mallampati: II  TM distance: >3 FB  Neck ROM: full  Mouth opening: >= 4 cm    Cardiovascular - normal exam   Dental   (+) Minor Abnormalities - some fillings, tiny chips      Pulmonary - normal exam      Neurological - normal exam  She appears awake, alert and oriented x3.    Other Findings       OUTSIDE LABS:  CBC:   Lab Results   Component Value Date    WBC 11.4 (H) 2025    HGB 13.4 2025    HCT 40.4 2025     2025     BMP:   Lab Results   Component Value Date     " "01/09/2025    POTASSIUM 4.2 01/09/2025    CHLORIDE 105 01/09/2025    CO2 26 01/09/2025    BUN 9.5 01/09/2025    CR 0.74 01/09/2025    GLC 92 01/09/2025     COAGS: No results found for: \"PTT\", \"INR\", \"FIBR\"  POC:   Lab Results   Component Value Date    HCG Negative 05/13/2025     HEPATIC:   Lab Results   Component Value Date    ALBUMIN 4.6 01/09/2025    PROTTOTAL 7.3 01/09/2025    ALT 20 01/09/2025    AST 23 01/09/2025    ALKPHOS 70 01/09/2025    BILITOTAL 0.3 01/09/2025     OTHER:   Lab Results   Component Value Date    SHAYY 9.0 01/09/2025       Anesthesia Plan    ASA Status:  2      NPO Status: NPO Appropriate   Anesthesia Type: General.   Induction: intravenous.   Techniques and Equipment:     - Airway:   Planned airway equipment includes supraglottic airway.     Consents    Anesthesia Plan(s) and associated risks, benefits, and realistic alternatives discussed. Questions answered and patient/representative(s) expressed understanding.     - Discussed:     - Discussed with:  Patient            Postoperative Care            Comments:                   Jai Justin MD    I have reviewed the pertinent notes and labs in the chart from the past 30 days and (re)examined the patient.  Any updates or changes from those notes are reflected in this note.    Clinically Significant Risk Factors Present on Admission                                              "
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
## 2025-05-29 ENCOUNTER — OFFICE VISIT (OUTPATIENT)
Dept: RADIATION ONCOLOGY | Facility: CLINIC | Age: 40
End: 2025-05-29
Attending: SURGERY
Payer: COMMERCIAL

## 2025-05-29 ENCOUNTER — PRE VISIT (OUTPATIENT)
Dept: RADIATION ONCOLOGY | Facility: CLINIC | Age: 40
End: 2025-05-29
Payer: COMMERCIAL

## 2025-05-29 ENCOUNTER — TELEPHONE (OUTPATIENT)
Dept: ONCOLOGY | Facility: CLINIC | Age: 40
End: 2025-05-29
Payer: COMMERCIAL

## 2025-05-29 ENCOUNTER — ONCOLOGY VISIT (OUTPATIENT)
Dept: ONCOLOGY | Facility: CLINIC | Age: 40
End: 2025-05-29
Attending: SURGERY
Payer: COMMERCIAL

## 2025-05-29 VITALS
OXYGEN SATURATION: 98 % | SYSTOLIC BLOOD PRESSURE: 109 MMHG | DIASTOLIC BLOOD PRESSURE: 73 MMHG | RESPIRATION RATE: 16 BRPM | TEMPERATURE: 97.6 F | HEIGHT: 72 IN | BODY MASS INDEX: 25.22 KG/M2 | WEIGHT: 186.2 LBS | HEART RATE: 73 BPM

## 2025-05-29 VITALS
OXYGEN SATURATION: 98 % | TEMPERATURE: 97.6 F | SYSTOLIC BLOOD PRESSURE: 109 MMHG | HEART RATE: 73 BPM | WEIGHT: 186.2 LBS | RESPIRATION RATE: 16 BRPM | HEIGHT: 72 IN | BODY MASS INDEX: 25.22 KG/M2 | DIASTOLIC BLOOD PRESSURE: 73 MMHG

## 2025-05-29 DIAGNOSIS — C50.411 MALIGNANT NEOPLASM OF UPPER-OUTER QUADRANT OF RIGHT BREAST IN FEMALE, ESTROGEN RECEPTOR POSITIVE (H): ICD-10-CM

## 2025-05-29 DIAGNOSIS — Z17.0 MALIGNANT NEOPLASM OF RIGHT BREAST IN FEMALE, ESTROGEN RECEPTOR POSITIVE, UNSPECIFIED SITE OF BREAST (H): Primary | ICD-10-CM

## 2025-05-29 DIAGNOSIS — Z17.0 MALIGNANT NEOPLASM OF OVERLAPPING SITES OF RIGHT BREAST IN FEMALE, ESTROGEN RECEPTOR POSITIVE (H): ICD-10-CM

## 2025-05-29 DIAGNOSIS — C50.811 MALIGNANT NEOPLASM OF OVERLAPPING SITES OF RIGHT BREAST IN FEMALE, ESTROGEN RECEPTOR POSITIVE (H): ICD-10-CM

## 2025-05-29 DIAGNOSIS — Z17.0 MALIGNANT NEOPLASM OF UPPER-OUTER QUADRANT OF RIGHT BREAST IN FEMALE, ESTROGEN RECEPTOR POSITIVE (H): ICD-10-CM

## 2025-05-29 DIAGNOSIS — C50.911 MALIGNANT NEOPLASM OF RIGHT BREAST IN FEMALE, ESTROGEN RECEPTOR POSITIVE, UNSPECIFIED SITE OF BREAST (H): Primary | ICD-10-CM

## 2025-05-29 PROCEDURE — 99213 OFFICE O/P EST LOW 20 MIN: CPT | Performed by: SURGERY

## 2025-05-29 PROCEDURE — 99213 OFFICE O/P EST LOW 20 MIN: CPT | Performed by: RADIOLOGY

## 2025-05-29 RX ORDER — ACETAMINOPHEN 325 MG/1
975 TABLET ORAL ONCE
OUTPATIENT
Start: 2025-05-29 | End: 2025-05-29

## 2025-05-29 RX ORDER — CEFAZOLIN SODIUM 2 G/50ML
2 SOLUTION INTRAVENOUS SEE ADMIN INSTRUCTIONS
OUTPATIENT
Start: 2025-05-29

## 2025-05-29 RX ORDER — CEFAZOLIN SODIUM 2 G/50ML
2 SOLUTION INTRAVENOUS
OUTPATIENT
Start: 2025-05-29

## 2025-05-29 ASSESSMENT — PAIN SCALES - GENERAL
PAINLEVEL_OUTOF10: NO PAIN (0)
PAINLEVEL_OUTOF10: NO PAIN (0)

## 2025-05-29 NOTE — TELEPHONE ENCOUNTER
Called patient to schedule surgery with Dr. Larios    Spoke with:  patient    Date of Surgery: 6/5/2025    Arrival time Discussed with Patient:  No    Location of surgery: [facility]     Pre-Op H&P: surgeon will update/complete DOS    Post-Op Appts: 6/26/25, 8:15 AM, [facility]    Imaging:  No     Discussed with patient pre-op RN will call 2-3 days prior to surgery with arrival time and instructions:  Yes     Packet sent out: Yes  via [employer] Message [DATE] 5/29/25      Informed patient that they will need an adult  to bring patient home from surgery: Yes  : partner       Additional Comments:  NA      All patients questions were answered and patient was instructed to review surgical packet and call back with any questions or concerns.       Emmett Espino on 5/29/2025 at 3:10 PM

## 2025-05-29 NOTE — LETTER
5/29/2025      Phoebe Alva  [address]  [facility] [address]      Dear Colleague,    Thank you for referring your patient, Phoebe Alva, to the [facility]. Please see a copy of my visit note below.      [facility]  [address] [facility] [address]  Phone: [phone]  Fax: [phone]    PATIENT NAME:  Phoebe Alva  PATIENT YOB: 1985    FOLLOW-UP  May 29, 2025    Phoebe Alva is a 40 year old female who returns for her 1st post-operative follow-up visit.     Cancer Staging   Malignant neoplasm of overlapping sites of right breast in female, estrogen receptor positive (H)  Staging form: Breast, AJCC 8th Edition  - Clinical stage from 3/24/2025: Stage IB (cT2, cN0, cM0, G1, ER+, [place]+, HER2: Equivocal) - Signed by Jessika Morales MD on 3/24/2025    Treatment to date:  Right segmental mastectomy and right axillary sentinel lymph node mapping and biopsy (5/13/2025)    HPI:    She underwent a right segmental mastectomy and right SLNB on 5/13/2025.  She is currently 2 week(s) post-op.  Final surgical pathology showed a pT2N1a invasive ductal carcinoma with a focally involved lateral margin in a 6 cm background of DCIS, and 1/4 lymph nodes.    Since the procedure, she has been doing well. She denies any redness or swelling, or drainage from the incision, or fever/chills.  She has good range of motion and denies any upper extremity swelling.  She has had some burning/stinging pain in the axilla.    /73 (BP Location: Right arm, Patient Position: Sitting, Cuff Size: Adult Regular)   Pulse 73   Temp 97.6  F (36.4  C) (Oral)   Resp 16   Ht 1.829 m (6')   Wt 84.5 kg (186 lb 3.2 oz)   LMP 05/12/2025 (Approximate)   SpO2 98%   BMI 25.25 kg/m     Physical Exam  Constitutional:       Appearance: She is well-developed.   Pulmonary:      Effort: No respiratory distress.   Chest:       Lymphadenopathy:       Comments: Axillary incision healing well without hematoma or cellulitis. There is a 4 cm seroma.   Skin:     General: Skin is warm and dry.         INVESTIGATIONS:    Surgical Pathology (5/13/2025):  Final Diagnosis   A. Lymph node, RIGHT axillary, sentinel #1, excision:  -One benign lymph node (0/1)  -Prior core biopsy site changes     B. Lymph node, RIGHT axillary, sentinel #2, excision:  -METASTATIC BREAST DUCTAL CARCINOMA in one of one lymph node (1/1)  -Metastasis is 5.5 mm in size  -No extranodal extension is identified  -Tumor emboli in perinodal lymphatics   -Metastatic carcinoma is estrogen receptor positive, progesterone receptor positive, and HER2 negative (score 1+) by immunohistochemistry (performed on this specimen, see 'Breast Biomarker Reporting Template' below)  -See comment     C. Lymph node, RIGHT axillary, sentinel #3, excision:  -One benign lymph node (0/1)     D. Lymph node, RIGHT axillary, sentinel #4, excision:  -One benign lymph node (0/1)     E. RIGHT breast, upper outer quadrant, segmental mastectomy:  -INVASIVE BREAST CARCINOMA OF NO SPECIAL TYPE (INVASIVE DUCTAL CARCINOMA), TRICIA GRADE 1, size 34 mm  -Ductal carcinoma in situ (DCIS), nuclear grade 2, cribriform, solid, and micropapillary type(s), with focal necrosis  -DCIS is admixed with and extends beyond invasive carcinoma, to span an estimated 60 mm  -Lymphovascular invasion present, extensive  -Lateral margin is focally involved by invasive carcinoma  -Invasive carcinoma is 2 mm from the superior margin and > 5 mm from the anterior, posterior, inferior and medial margins   -Margins are uninvolved by DCIS  -DCIS is 1.4 mm from the nearest (medial) margin, 1.5 mm from the lateral margin, 3 mm from the posterior margin, 5 mm from the superior margin and >5 mm from the anterior and inferior margins   -Other findings: fibrocystic change (including microcysts), columnar cell change, fibroadenoma (4 mm) and collagenous  spherulosis  -Calcifications associated with DCIS  -Prior core biopsy site changes   -Invasive carcinoma is estrogen receptor positive (%, strong intensity), progesterone receptor positive (%, strong intensity), and HER2 equivocal (score 2+) by immunohistochemistry and is HER2 non-amplified (group 5) by FISH (performed on prior core biopsy, see report [id number] specimen A)       ASSESSMENT:    Phoebe Alva is a 40 year old female with RIGHT breast cancer.    She is healing well.  I recommended she start moisturizing and massaging the scar with Vaseline.     We reviewed the pathology today and a copy of the report was provided. We discussed a re-excisional segmental mastectomy for the involved lateral margin versus completion mastectomy.  We reviewed that adjuvant radiation therapy will be recommended regardless of breast surgical approach given the lymph node involvement.    All of the above was discussed with the patient and all questions were answered.  After careful consideration, Phoebe Alva elected to proceed with RIGHT re-excisional segmental mastectomy.    PLAN:  RIGHT re-excisional segmental mastectomy  Radiation oncology consultation with Dr Rose today as scheduled  Follow up with Dr Andrew Larios MD MS [facility] FACS  Associate Professor of Surgery  Division of Surgical Oncology  [facility]     Again, thank you for allowing me to participate in the care of your patient.        Sincerely,        Isi Larios MD    Electronically signed

## 2025-05-29 NOTE — NURSING NOTE
Oncology Rooming Note    May 29, 2025 8:18 AM   Phoebe Alva is a 40 year old female who presents for:    Chief Complaint   Patient presents with    Oncology Clinic Visit     Malignant neoplasm of overlapping sites of right breast in female, estrogen receptor positive     Initial Vitals: LMP 05/12/2025 (Approximate)  Estimated body mass index is 25.25 kg/m  as calculated from the following:    Height as of an earlier encounter on 5/29/25: 1.829 m (6').    Weight as of an earlier encounter on 5/29/25: 84.5 kg (186 lb 3.2 oz). There is no height or weight on file to calculate BSA.  Data Unavailable Comment: Data Unavailable   Patient's last menstrual period was 05/12/2025 (approximate).  Allergies reviewed: Yes  Medications reviewed: Yes    Medications: Medication refills not needed today.  Pharmacy name entered into wiMAN: [facility] #[id number] - [place], [place] - [address]    Frailty Screening:   Is the patient here for a new oncology consult visit in cancer care? 1. Yes. Over the past month, have you experienced difficulty or required a caregiver to assist with:   1. Balance, walking or general mobility (including any falls)? NO  2. Completion of self-care tasks such as bathing, dressing, toileting, grooming/hygiene?  NO  3. Concentration or memory that affects your daily life?  NO     PHQ9:  Did this patient require a PHQ9?: No      Clinical concerns: none      Bernardo Scott

## 2025-05-29 NOTE — TELEPHONE ENCOUNTER
Left voicemail for patient regarding scheduling surgery with Dr. Larios.    Left call back [phone].    Emmett Espino on 5/29/2025 at 10:34 AM

## 2025-05-29 NOTE — PROGRESS NOTES
Oncology Rooming Note    May 29, 2025 9:06 AM   Phoebe Alva is a 40 year old female who presents for:    Chief Complaint   Patient presents with    Oncology Clinic Visit     Malignant neoplasm of overlapping sites of right breast in female, estrogen receptor positive     Initial Vitals: /73 (BP Location: Right arm, Patient Position: Sitting, Cuff Size: Adult Regular)   Pulse 73   Temp 97.6  F (36.4  C) (Oral)   Resp 16   Ht 1.829 m (6')   Wt 84.5 kg (186 lb 3.2 oz)   LMP 05/12/2025 (Approximate)   SpO2 98%   BMI 25.25 kg/m   Estimated body mass index is 25.25 kg/m  as calculated from the following:    Height as of this encounter: 1.829 m (6').    Weight as of this encounter: 84.5 kg (186 lb 3.2 oz). Body surface area is 2.07 meters squared.  No Pain (0) Comment: Data Unavailable   Patient's last menstrual period was 05/12/2025 (approximate).  Allergies reviewed: Yes  Medications reviewed: Yes    Medications: Medication refills not needed today.  Pharmacy name entered into Paper[employer]: [facility] #[id number] - [place], [place] - [address]    Frailty Screening:   Is the patient here for a new oncology consult visit in cancer care? 2. No    PHQ9:  Did this patient require a PHQ9?: No      Clinical concerns: Here for consultation     Considerations for radiation treatment   Pregnancy status: If none of the above, pregnancy status must be documented.  @LASTLAB(HCGQUANT)@    Implanted Cardiac Devices: No   Any previous radiation therapy: No      Ann Marie Perla RN

## 2025-05-29 NOTE — PROGRESS NOTES
INITIAL PATIENT ASSESSMENT    Diagnosis: Breast cancer    Prior radiation therapy: None    Prior chemotherapy: None    Prior hormonal therapy:No    Pain Eval:  Denies    Psychosocial  Living arrangements: with her partner  Fall Risk: independent   referral needs: Not needed    Advanced Directive: No  Implantable Cardiac Device? No    Onset of menarche: age 17  LMP: Patient's last menstrual period was 05/12/2025 (approximate).  Onset of menopause: No menopausal signs at this time  Abnormal vaginal bleeding/discharge: No  Are you pregnant? No, partner has vasectomy for contraception  Reproductive note: No children

## 2025-05-29 NOTE — NURSING NOTE
Oncology Rooming Note    May 29, 2025 8:18 AM   Phoebe Alva is a 40 year old female who presents for:    Chief Complaint   Patient presents with    Oncology Clinic Visit     2-week post-op     Initial Vitals: /73 (BP Location: Right arm, Patient Position: Sitting, Cuff Size: Adult Regular)   Pulse 73   Temp 97.6  F (36.4  C) (Oral)   Resp 16   Ht 1.829 m (6')   Wt 84.5 kg (186 lb 3.2 oz)   LMP 05/12/2025 (Approximate)   SpO2 98%   BMI 25.25 kg/m   Estimated body mass index is 25.25 kg/m  as calculated from the following:    Height as of this encounter: 1.829 m (6').    Weight as of this encounter: 84.5 kg (186 lb 3.2 oz). Body surface area is 2.07 meters squared.  No Pain (0) Comment: Data Unavailable   Patient's last menstrual period was 05/12/2025 (approximate).  Allergies reviewed: Yes  Medications reviewed: Yes    Medications: Medication refills not needed today.  Pharmacy name entered into [email or web address]: [facility] #[id number] - [place], [place] - [address]    Frailty Screening:   Is the patient here for a new oncology consult visit in cancer care? 2. No    PHQ9:  Did this patient require a PHQ9?: No      Clinical concerns: none      Bernardo Scott

## 2025-05-29 NOTE — PROGRESS NOTES
[facility] [address] [address]  Phone: [phone]  Fax: [phone]    PATIENT NAME:  Phoebe Alva  PATIENT YOB: 1985    FOLLOW-UP  May 29, 2025    Phoebe Alva is a 40 year old female who returns for her 1st post-operative follow-up visit.     Cancer Staging   Malignant neoplasm of overlapping sites of right breast in female, estrogen receptor positive (H)  Staging form: Breast, AJCC 8th Edition  - Clinical stage from 3/24/2025: Stage IB (cT2, cN0, cM0, G1, ER+, [place]+, HER2: Equivocal) - Signed by Jessika Morales MD on 3/24/2025    Treatment to date:  Right segmental mastectomy and right axillary sentinel lymph node mapping and biopsy (5/13/2025)    HPI:    She underwent a right segmental mastectomy and right SLNB on 5/13/2025.  She is currently 2 week(s) post-op.  Final surgical pathology showed a pT2N1a invasive ductal carcinoma with a focally involved lateral margin in a 6 cm background of DCIS, and 1/4 lymph nodes.    Since the procedure, she has been doing well. She denies any redness or swelling, or drainage from the incision, or fever/chills.  She has good range of motion and denies any upper extremity swelling.  She has had some burning/stinging pain in the axilla.    /73 (BP Location: Right arm, Patient Position: Sitting, Cuff Size: Adult Regular)   Pulse 73   Temp 97.6  F (36.4  C) (Oral)   Resp 16   Ht 1.829 m (6')   Wt 84.5 kg (186 lb 3.2 oz)   LMP 05/12/2025 (Approximate)   SpO2 98%   BMI 25.25 kg/m     Physical Exam  Constitutional:       Appearance: She is well-developed.   Pulmonary:      Effort: No respiratory distress.   Chest:       Lymphadenopathy:      Comments: Axillary incision healing well without hematoma or cellulitis. There is a 4 cm seroma.   Skin:     General: Skin is warm and dry.         INVESTIGATIONS:    Surgical Pathology (5/13/2025):  Final Diagnosis   A. Lymph node, RIGHT  axillary, sentinel #1, excision:  -One benign lymph node (0/1)  -Prior core biopsy site changes     B. Lymph node, RIGHT axillary, sentinel #2, excision:  -METASTATIC BREAST DUCTAL CARCINOMA in one of one lymph node (1/1)  -Metastasis is 5.5 mm in size  -No extranodal extension is identified  -Tumor emboli in perinodal lymphatics   -Metastatic carcinoma is estrogen receptor positive, progesterone receptor positive, and HER2 negative (score 1+) by immunohistochemistry (performed on this specimen, see 'Breast Biomarker Reporting Template' below)  -See comment     C. Lymph node, RIGHT axillary, sentinel #3, excision:  -One benign lymph node (0/1)     D. Lymph node, RIGHT axillary, sentinel #4, excision:  -One benign lymph node (0/1)     E. RIGHT breast, upper outer quadrant, segmental mastectomy:  -INVASIVE BREAST CARCINOMA OF NO SPECIAL TYPE (INVASIVE DUCTAL CARCINOMA), TRICIA GRADE 1, size 34 mm  -Ductal carcinoma in situ (DCIS), nuclear grade 2, cribriform, solid, and micropapillary type(s), with focal necrosis  -DCIS is admixed with and extends beyond invasive carcinoma, to span an estimated 60 mm  -Lymphovascular invasion present, extensive  -Lateral margin is focally involved by invasive carcinoma  -Invasive carcinoma is 2 mm from the superior margin and > 5 mm from the anterior, posterior, inferior and medial margins   -Margins are uninvolved by DCIS  -DCIS is 1.4 mm from the nearest (medial) margin, 1.5 mm from the lateral margin, 3 mm from the posterior margin, 5 mm from the superior margin and >5 mm from the anterior and inferior margins   -Other findings: fibrocystic change (including microcysts), columnar cell change, fibroadenoma (4 mm) and collagenous spherulosis  -Calcifications associated with DCIS  -Prior core biopsy site changes   -Invasive carcinoma is estrogen receptor positive (%, strong intensity), progesterone receptor positive (%, strong intensity), and HER2 equivocal (score 2+)  by immunohistochemistry and is HER2 non-amplified (group 5) by FISH (performed on prior core biopsy, see report [id number] specimen A)       ASSESSMENT:    Phoebe Alva is a 40 year old female with RIGHT breast cancer.    She is healing well.  I recommended she start moisturizing and massaging the scar with Vaseline.     We reviewed the pathology today and a copy of the report was provided. We discussed a re-excisional segmental mastectomy for the involved lateral margin versus completion mastectomy.  We reviewed that adjuvant radiation therapy will be recommended regardless of breast surgical approach given the lymph node involvement.    All of the above was discussed with the patient and all questions were answered.  After careful consideration, Phoebe Alva elected to proceed with RIGHT re-excisional segmental mastectomy.    PLAN:  RIGHT re-excisional segmental mastectomy  Radiation oncology consultation with Dr Rose today as scheduled  Follow up with Dr Andrew Larios MD MS [facility] FACS  Associate Professor of Surgery  Division of Surgical Oncology  [facility]
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
## 2025-06-02 ENCOUNTER — OFFICE VISIT (OUTPATIENT)
Dept: OBGYN | Facility: CLINIC | Age: 40
End: 2025-06-02
Attending: FAMILY MEDICINE
Payer: COMMERCIAL

## 2025-06-02 VITALS
SYSTOLIC BLOOD PRESSURE: 131 MMHG | DIASTOLIC BLOOD PRESSURE: 80 MMHG | WEIGHT: 184 LBS | HEART RATE: 93 BPM | TEMPERATURE: 98.1 F | BODY MASS INDEX: 24.95 KG/M2

## 2025-06-02 DIAGNOSIS — N83.299 COMPLEX OVARIAN CYST: Primary | ICD-10-CM

## 2025-06-02 DIAGNOSIS — Z30.09 ENCOUNTER FOR CONSULTATION FOR FEMALE STERILIZATION: ICD-10-CM

## 2025-06-02 DIAGNOSIS — N80.129 ENDOMETRIOMA OF OVARY: ICD-10-CM

## 2025-06-02 PROCEDURE — 3075F SYST BP GE 130 - 139MM HG: CPT | Performed by: OBSTETRICS & GYNECOLOGY

## 2025-06-02 PROCEDURE — 3079F DIAST BP 80-89 MM HG: CPT | Performed by: OBSTETRICS & GYNECOLOGY

## 2025-06-02 PROCEDURE — 99244 OFF/OP CNSLTJ NEW/EST MOD 40: CPT | Performed by: OBSTETRICS & GYNECOLOGY
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
## 2025-06-02 NOTE — Clinical Note
Hi Dr. Morales, I saw our mutual pt Phoebe about a likely ovarian endometrioma. Since her breast cancer is ER+WV+, we wanted to get your opinion about if she is having surgery to remove one ovary if there would be a benefit from a breast cancer perspective to remove both? I think she an appt with you next week. My schedulers are trying to find a time for her to have surgery this summer but also mindful of and want to work around her breast cancer treatments.  Thanks! Gabrielle Vega MD

## 2025-06-04 ENCOUNTER — ANESTHESIA EVENT (OUTPATIENT)
Dept: SURGERY | Facility: AMBULATORY SURGERY CENTER | Age: 40
End: 2025-06-04
Payer: COMMERCIAL

## 2025-06-04 ENCOUNTER — TELEPHONE (OUTPATIENT)
Dept: OBGYN | Facility: CLINIC | Age: 40
End: 2025-06-04
Payer: COMMERCIAL

## 2025-06-05 ENCOUNTER — ANESTHESIA (OUTPATIENT)
Dept: SURGERY | Facility: AMBULATORY SURGERY CENTER | Age: 40
End: 2025-06-05
Payer: COMMERCIAL

## 2025-06-05 ENCOUNTER — MYC MEDICAL ADVICE (OUTPATIENT)
Dept: OBGYN | Facility: CLINIC | Age: 40
End: 2025-06-05

## 2025-06-05 ENCOUNTER — HOSPITAL ENCOUNTER (OUTPATIENT)
Facility: AMBULATORY SURGERY CENTER | Age: 40
End: 2025-06-05
Attending: SURGERY
Payer: COMMERCIAL

## 2025-06-05 VITALS
HEART RATE: 58 BPM | HEIGHT: 72 IN | BODY MASS INDEX: 24.65 KG/M2 | SYSTOLIC BLOOD PRESSURE: 111 MMHG | OXYGEN SATURATION: 98 % | WEIGHT: 182 LBS | RESPIRATION RATE: 16 BRPM | TEMPERATURE: 97 F | DIASTOLIC BLOOD PRESSURE: 56 MMHG

## 2025-06-05 PROCEDURE — 88305 TISSUE EXAM BY PATHOLOGIST: CPT | Mod: TC | Performed by: SURGERY

## 2025-06-05 PROCEDURE — 88305 TISSUE EXAM BY PATHOLOGIST: CPT | Mod: 26 | Performed by: PATHOLOGY

## 2025-06-05 RX ORDER — DEXAMETHASONE SODIUM PHOSPHATE 10 MG/ML
4 INJECTION, SOLUTION INTRAMUSCULAR; INTRAVENOUS
Status: ACTIVE | OUTPATIENT
Start: 2025-06-05

## 2025-06-05 RX ORDER — ONDANSETRON 2 MG/ML
4 INJECTION INTRAMUSCULAR; INTRAVENOUS EVERY 30 MIN PRN
Status: ACTIVE | OUTPATIENT
Start: 2025-06-05

## 2025-06-05 RX ORDER — FENTANYL CITRATE 50 UG/ML
25-50 INJECTION, SOLUTION INTRAMUSCULAR; INTRAVENOUS
Status: DISCONTINUED | OUTPATIENT
Start: 2025-06-05 | End: 2025-06-05 | Stop reason: HOSPADM

## 2025-06-05 RX ORDER — SODIUM CHLORIDE, SODIUM LACTATE, POTASSIUM CHLORIDE, CALCIUM CHLORIDE 600; 310; 30; 20 MG/100ML; MG/100ML; MG/100ML; MG/100ML
INJECTION, SOLUTION INTRAVENOUS CONTINUOUS
Status: DISCONTINUED | OUTPATIENT
Start: 2025-06-05 | End: 2025-06-05 | Stop reason: HOSPADM

## 2025-06-05 RX ORDER — LABETALOL HYDROCHLORIDE 5 MG/ML
10 INJECTION, SOLUTION INTRAVENOUS
Status: ACTIVE | OUTPATIENT
Start: 2025-06-05

## 2025-06-05 RX ORDER — NALOXONE HYDROCHLORIDE 0.4 MG/ML
0.2 INJECTION, SOLUTION INTRAMUSCULAR; INTRAVENOUS; SUBCUTANEOUS
Status: DISCONTINUED | OUTPATIENT
Start: 2025-06-05 | End: 2025-06-05 | Stop reason: HOSPADM

## 2025-06-05 RX ORDER — CEFAZOLIN SODIUM 2 G/50ML
2 SOLUTION INTRAVENOUS SEE ADMIN INSTRUCTIONS
Status: DISCONTINUED | OUTPATIENT
Start: 2025-06-05 | End: 2025-06-05 | Stop reason: HOSPADM

## 2025-06-05 RX ORDER — PROPOFOL 10 MG/ML
INJECTION, EMULSION INTRAVENOUS PRN
Status: DISCONTINUED | OUTPATIENT
Start: 2025-06-05 | End: 2025-06-05

## 2025-06-05 RX ORDER — ACETAMINOPHEN 325 MG/1
975 TABLET ORAL ONCE
Status: COMPLETED | OUTPATIENT
Start: 2025-06-05 | End: 2025-06-05

## 2025-06-05 RX ORDER — FENTANYL CITRATE 50 UG/ML
25 INJECTION, SOLUTION INTRAMUSCULAR; INTRAVENOUS EVERY 5 MIN PRN
Status: ACTIVE | OUTPATIENT
Start: 2025-06-05

## 2025-06-05 RX ORDER — HYDROMORPHONE HYDROCHLORIDE 1 MG/ML
0.4 INJECTION, SOLUTION INTRAMUSCULAR; INTRAVENOUS; SUBCUTANEOUS EVERY 5 MIN PRN
Status: ACTIVE | OUTPATIENT
Start: 2025-06-05

## 2025-06-05 RX ORDER — ONDANSETRON 4 MG/1
4 TABLET, ORALLY DISINTEGRATING ORAL EVERY 30 MIN PRN
Status: ACTIVE | OUTPATIENT
Start: 2025-06-05

## 2025-06-05 RX ORDER — ONDANSETRON 2 MG/ML
INJECTION INTRAMUSCULAR; INTRAVENOUS PRN
Status: DISCONTINUED | OUTPATIENT
Start: 2025-06-05 | End: 2025-06-05

## 2025-06-05 RX ORDER — DEXAMETHASONE SODIUM PHOSPHATE 4 MG/ML
INJECTION, SOLUTION INTRA-ARTICULAR; INTRALESIONAL; INTRAMUSCULAR; INTRAVENOUS; SOFT TISSUE PRN
Status: DISCONTINUED | OUTPATIENT
Start: 2025-06-05 | End: 2025-06-05

## 2025-06-05 RX ORDER — BUPIVACAINE HYDROCHLORIDE 2.5 MG/ML
INJECTION, SOLUTION INFILTRATION; PERINEURAL PRN
Status: DISCONTINUED | OUTPATIENT
Start: 2025-06-05 | End: 2025-06-05 | Stop reason: HOSPADM

## 2025-06-05 RX ORDER — LIDOCAINE HYDROCHLORIDE 20 MG/ML
INJECTION, SOLUTION INFILTRATION; PERINEURAL PRN
Status: DISCONTINUED | OUTPATIENT
Start: 2025-06-05 | End: 2025-06-05

## 2025-06-05 RX ORDER — GLYCOPYRROLATE 0.2 MG/ML
INJECTION, SOLUTION INTRAMUSCULAR; INTRAVENOUS PRN
Status: DISCONTINUED | OUTPATIENT
Start: 2025-06-05 | End: 2025-06-05

## 2025-06-05 RX ORDER — FENTANYL CITRATE 50 UG/ML
50 INJECTION, SOLUTION INTRAMUSCULAR; INTRAVENOUS EVERY 5 MIN PRN
Status: ACTIVE | OUTPATIENT
Start: 2025-06-05

## 2025-06-05 RX ORDER — BUPIVACAINE HYDROCHLORIDE 2.5 MG/ML
INJECTION, SOLUTION EPIDURAL; INFILTRATION; INTRACAUDAL; PERINEURAL
Status: COMPLETED | OUTPATIENT
Start: 2025-06-05 | End: 2025-06-05

## 2025-06-05 RX ORDER — FLUMAZENIL 0.1 MG/ML
0.2 INJECTION, SOLUTION INTRAVENOUS
Status: DISCONTINUED | OUTPATIENT
Start: 2025-06-05 | End: 2025-06-05 | Stop reason: HOSPADM

## 2025-06-05 RX ORDER — ACETAMINOPHEN 325 MG/1
975 TABLET ORAL ONCE
Status: DISCONTINUED | OUTPATIENT
Start: 2025-06-05 | End: 2025-06-05 | Stop reason: HOSPADM

## 2025-06-05 RX ORDER — OXYCODONE HYDROCHLORIDE 5 MG/1
5 TABLET ORAL
Status: ACTIVE | OUTPATIENT
Start: 2025-06-05

## 2025-06-05 RX ORDER — NALOXONE HYDROCHLORIDE 0.4 MG/ML
0.4 INJECTION, SOLUTION INTRAMUSCULAR; INTRAVENOUS; SUBCUTANEOUS
Status: DISCONTINUED | OUTPATIENT
Start: 2025-06-05 | End: 2025-06-05 | Stop reason: HOSPADM

## 2025-06-05 RX ORDER — FENTANYL CITRATE 50 UG/ML
INJECTION, SOLUTION INTRAMUSCULAR; INTRAVENOUS PRN
Status: DISCONTINUED | OUTPATIENT
Start: 2025-06-05 | End: 2025-06-05

## 2025-06-05 RX ORDER — OXYCODONE HYDROCHLORIDE 5 MG/1
10 TABLET ORAL
Status: ACTIVE | OUTPATIENT
Start: 2025-06-05

## 2025-06-05 RX ORDER — HYDROMORPHONE HYDROCHLORIDE 1 MG/ML
0.2 INJECTION, SOLUTION INTRAMUSCULAR; INTRAVENOUS; SUBCUTANEOUS EVERY 5 MIN PRN
Status: ACTIVE | OUTPATIENT
Start: 2025-06-05

## 2025-06-05 RX ORDER — HYDRALAZINE HYDROCHLORIDE 20 MG/ML
2.5-5 INJECTION INTRAMUSCULAR; INTRAVENOUS EVERY 10 MIN PRN
Status: ACTIVE | OUTPATIENT
Start: 2025-06-05

## 2025-06-05 RX ORDER — NALOXONE HYDROCHLORIDE 0.4 MG/ML
0.1 INJECTION, SOLUTION INTRAMUSCULAR; INTRAVENOUS; SUBCUTANEOUS
Status: ACTIVE | OUTPATIENT
Start: 2025-06-05

## 2025-06-05 RX ORDER — LIDOCAINE 40 MG/G
CREAM TOPICAL
Status: DISCONTINUED | OUTPATIENT
Start: 2025-06-05 | End: 2025-06-05 | Stop reason: HOSPADM

## 2025-06-05 RX ORDER — PROPOFOL 10 MG/ML
INJECTION, EMULSION INTRAVENOUS CONTINUOUS PRN
Status: DISCONTINUED | OUTPATIENT
Start: 2025-06-05 | End: 2025-06-05

## 2025-06-05 RX ORDER — SODIUM CHLORIDE, SODIUM LACTATE, POTASSIUM CHLORIDE, CALCIUM CHLORIDE 600; 310; 30; 20 MG/100ML; MG/100ML; MG/100ML; MG/100ML
INJECTION, SOLUTION INTRAVENOUS CONTINUOUS
Status: ACTIVE | OUTPATIENT
Start: 2025-06-05

## 2025-06-05 RX ORDER — CEFAZOLIN SODIUM 2 G/50ML
2 SOLUTION INTRAVENOUS
Status: COMPLETED | OUTPATIENT
Start: 2025-06-05 | End: 2025-06-05

## 2025-06-05 RX ADMIN — PROPOFOL 200 MCG/KG/MIN: 10 INJECTION, EMULSION INTRAVENOUS at 14:00

## 2025-06-05 RX ADMIN — GLYCOPYRROLATE 0.2 MG: 0.2 INJECTION, SOLUTION INTRAMUSCULAR; INTRAVENOUS at 13:56

## 2025-06-05 RX ADMIN — SODIUM CHLORIDE, SODIUM LACTATE, POTASSIUM CHLORIDE, CALCIUM CHLORIDE: 600; 310; 30; 20 INJECTION, SOLUTION INTRAVENOUS at 13:27

## 2025-06-05 RX ADMIN — LIDOCAINE HYDROCHLORIDE 100 MG: 20 INJECTION, SOLUTION INFILTRATION; PERINEURAL at 14:04

## 2025-06-05 RX ADMIN — DEXAMETHASONE SODIUM PHOSPHATE 4 MG: 4 INJECTION, SOLUTION INTRA-ARTICULAR; INTRALESIONAL; INTRAMUSCULAR; INTRAVENOUS; SOFT TISSUE at 14:07

## 2025-06-05 RX ADMIN — ACETAMINOPHEN 975 MG: 325 TABLET ORAL at 13:26

## 2025-06-05 RX ADMIN — FENTANYL CITRATE 50 MCG: 50 INJECTION, SOLUTION INTRAMUSCULAR; INTRAVENOUS at 14:00

## 2025-06-05 RX ADMIN — ONDANSETRON 4 MG: 2 INJECTION INTRAMUSCULAR; INTRAVENOUS at 14:07

## 2025-06-05 RX ADMIN — PROPOFOL 60 MG: 10 INJECTION, EMULSION INTRAVENOUS at 14:31

## 2025-06-05 RX ADMIN — PROPOFOL 200 MG: 10 INJECTION, EMULSION INTRAVENOUS at 14:04

## 2025-06-05 RX ADMIN — BUPIVACAINE HYDROCHLORIDE 10 ML: 2.5 INJECTION, SOLUTION EPIDURAL; INFILTRATION; INTRACAUDAL; PERINEURAL at 14:14

## 2025-06-05 RX ADMIN — CEFAZOLIN SODIUM 2 G: 2 SOLUTION INTRAVENOUS at 13:54

## 2025-06-05 RX ADMIN — FENTANYL CITRATE 50 MCG: 50 INJECTION, SOLUTION INTRAMUSCULAR; INTRAVENOUS at 14:23

## 2025-06-05 RX ADMIN — PROPOFOL 70 MG: 10 INJECTION, EMULSION INTRAVENOUS at 14:29

## 2025-06-05 NOTE — OP NOTE
PATIENT NAME:  Phoebe Alva  PATIENT YOB: 1985    DATE OF SURGERY: June 5, 2025     SURGEON: Isi Larios MD  ASSISTANT(S): Antonette Dejesus MD PGY-3    PREOPERATIVE DIAGNOSIS: Right breast cancer, upper outer quadrant  POSTOPERATIVE DIAGNOSIS: same    PROCEDURE(S): Right re-excisional segmental mastectomy    ANESTHESIA: General + Block    CLINICAL NOTE:  Phoebe Alva is a 40 year old woman with a right breast cancer, s/p segmental mastectomy and sentinel lymph node biopsy. Pathology showed a focally involved lateral margin.  The risks and benefits of a re-excisional segmental mastectomy for management of that margin were discussed with the patient and she elected to proceed with informed consent.    OPERATIVE FINDINGS:  1. Prior lumpectomy cavity identified and a new lateral margin was resected.    OPERATIVE PROCEDURE:  The patient was brought to the operating room and placed in the supine position with appropriate padding for all of the pressure points.  A general anesthetic was administered by the Anesthesia team.  A surgical safety checklist was performed to confirm the patient's identity, the site and laterality of the procedure. Perioperative antibiotics (Ancef) was provided.  VTE prophylaxis was provided with serial compression devices. The right  breast was then prepped and draped in the usual sterile fashion using Chloraprep.     The prior lumpectomy incision was opened. The seroma was drained. The lumpectomy cavity was identified. A new lateral margin was resected, inked and sent to pathology.  The wound was irrigated and hemostasis was achieved. A new hemoclip was placed to rio the new lateral margin.  10 mL of 0.25% marcaine without epinephrine was infiltrated into the surgical site.  The incision was closed in two layers with interrupted 3-0 vicryl and a running subcuticular 4-0 monocryl. Steristrips and Primipore dressings were applied.   The patient tolerated the procedure well. The  sponge, needle, instrument counts were correct.  The patient was then awakened and taken to recovery in stable fashion.     I was present and scrubbed for the entire above procedure.    Dr. Dejesus actively first assisted during this operation providing necessary retraction and exposure as well as maintaining hemostasis and clear operative field.  This was helpful in allowing the operation to proceed in a safe and expeditious manner.  They were present for the entire duration of the critical portions of the operation.    EBL: 2 mL    SPECIMEN(S):  A. Right breast, new lateral margin    POSTOPERATIVE PLANS:  Phoebe Alva will be discharged home today with wound care instructions and no prescription for analgesics.  She will follow-up with me in 2 weeks.     Isi Larios MD MSc [facility] FACS  Associate Professor of Surgery  Division of Surgical Oncology  [facility]

## 2025-06-05 NOTE — ANESTHESIA PREPROCEDURE EVALUATION
"Anesthesia Pre-Procedure Evaluation    Patient: Phoebe Alva   MRN: [id number] : 1985          Procedure : Procedure(s):  RIGHT Re-Excisional Segmental Mastectomy (=\"Lumpectomy\")         Past Medical History:   Diagnosis Date    Breast cancer (H)     Cervical high risk HPV (human papillomavirus) test positive 2021 NIL 18/19 NIL pap, neg HPV 21 NIL pap, +HR HPV (not 16/18). Plan: cotest in 1 year      Past Surgical History:   Procedure Laterality Date    LUMPECTOMY BREAST WITH SENTINEL NODE, COMBINED Right 2025    Procedure: RIGHT Segmental Mastectomy (=\"Lumpectomy\"), RIGHT Axillary [place] Lymph Node Biopsy;  Surgeon: Isi Larios MD;  [place] [facility] [place]      No Known Allergies   Social History     Tobacco Use    Smoking status: Never     Passive exposure: Never    Smokeless tobacco: Never   Substance Use Topics    Alcohol use: Yes     Alcohol/week: 5.0 standard drinks of alcohol     Types: 5 Glasses of wine per week     Comment: 1 glass wine, 5 days of week      Wt Readings from Last 1 Encounters:   25 82.6 kg (182 lb)        Anesthesia Evaluation   Pt has had prior anesthetic. Type: General.    No history of anesthetic complications       ROS/MED HX  ENT/Pulmonary:  - neg pulmonary ROS     Neurologic:  - neg neurologic ROS     Cardiovascular:  - neg cardiovascular ROS     METS/Exercise Tolerance:     Hematologic:  - neg hematologic  ROS     Musculoskeletal:  - neg musculoskeletal ROS     GI/Hepatic:  - neg GI/hepatic ROS     Renal/Genitourinary:  - neg Renal ROS     Endo:  - neg endo ROS     Psychiatric/Substance Use:  - neg psychiatric ROS     Infectious Disease:  - neg infectious disease ROS     Malignancy:   (+) Malignancy, History of Breast.Breast CA status post Surgery.      Other:  - neg other ROS            Physical Exam  Airway  Mallampati: I  TM distance: >3 FB  Neck ROM: full  Upper bite lip test: I  Mouth opening: >= 4 cm    Cardiovascular   Rhythm: " "regular  Rate: normal rate     Dental   (+) Completely normal teeth      Pulmonary Breath sounds clear to auscultation        Neurological   She appears awake, alert and oriented x3.    Other Findings       OUTSIDE LABS:  CBC:   Lab Results   Component Value Date    WBC 11.4 (H) 01/09/2025    HGB 13.4 01/09/2025    HCT 40.4 01/09/2025     01/09/2025     BMP:   Lab Results   Component Value Date     01/09/2025    POTASSIUM 4.2 01/09/2025    CHLORIDE 105 01/09/2025    CO2 26 01/09/2025    BUN 9.5 01/09/2025    CR 0.74 01/09/2025    GLC 92 01/09/2025     COAGS: No results found for: \"PTT\", \"INR\", \"FIBR\"  POC:   Lab Results   Component Value Date    HCG Negative 06/05/2025     HEPATIC:   Lab Results   Component Value Date    ALBUMIN 4.6 01/09/2025    PROTTOTAL 7.3 01/09/2025    ALT 20 01/09/2025    AST 23 01/09/2025    ALKPHOS 70 01/09/2025    BILITOTAL 0.3 01/09/2025     OTHER:   Lab Results   Component Value Date    SHAYY 9.0 01/09/2025       Anesthesia Plan    ASA Status:  1      NPO Status: NPO Appropriate   Anesthesia Type: General.  Airway: supraglottic airway.  Induction: intravenous.  Maintenance: Balanced.   Techniques and Equipment:     - Airway:  Planned airway equipment includes supraglottic airway.     - Monitoring Plan: standard ASA monitoring     Consents    Anesthesia Plan(s) and associated risks, benefits, and realistic alternatives discussed. Questions answered and patient/representative(s) expressed understanding.     - Discussed:     - Discussed with:  Patient               Postoperative Care    Pain management: non-narcotic analgesics, plan for postoperative opioid use.     Comments:                   Boaz Woods MD    I have reviewed the pertinent notes and labs in the chart from the past 30 days and (re)examined the patient.  Any updates or changes from those notes are reflected in this note.    Clinically Significant Risk Factors Present on Admission                                 "

## 2025-06-05 NOTE — ANESTHESIA PROCEDURE NOTES
Airway       Patient location during procedure: OR  Staff -        Performed By: CRNAIndications and Patient Condition       Indications for airway management: brandi-procedural       Induction type:intravenous       Mask difficulty assessment: 1 - vent by mask    Final Airway Details       Final airway type: supraglottic airway    Supraglottic Airway Details        Type: LMA       Brand: LMA Unique       LMA size: 4    Post intubation assessment        Placement verified by: capnometry and equal breath sounds        Number of attempts at approach: 1       Number of other approaches attempted: 0       Secured with: tape       Ease of procedure: easy       Dentition: Intact and Unchanged

## 2025-06-05 NOTE — ANESTHESIA CARE TRANSFER NOTE
"  Patient: Phoebe Alva    Procedure: Procedure(s):  RIGHT Re-Excisional Segmental Mastectomy (=\"Lumpectomy\")       Diagnosis: Malignant neoplasm of upper-outer quadrant of right breast in female, estrogen receptor positive (H) [C50.411, Z17.0]  Diagnosis Additional Information: No value filed.    Anesthesia Type:   General     Note:    Oropharynx: spontaneously breathing  Level of Consciousness: awake  Oxygen Supplementation: face mask  Level of Supplemental Oxygen (L/min / FiO2): 6  Independent Airway: airway patency satisfactory and stable  Dentition: dentition unchanged  Vital Signs Stable: post-procedure vital signs reviewed and stable  Report to RN Given: handoff report given  Patient transferred to: PACU    Handoff Report: Identifed the Patient, Identified the Reponsible Provider, Reviewed the pertinent medical history, Discussed the surgical course, Reviewed Intra-OP anesthesia mangement and issues during anesthesia, Set expectations for post-procedure period and Allowed opportunity for questions and acknowledgement of understanding      Vitals:  Vitals Value Taken Time   BP 99/62 06/05/25 15:01   Temp 97.2    Pulse 68 06/05/25 15:04   Resp 9 06/05/25 15:04   SpO2 95 % 06/05/25 15:04   Vitals shown include unfiled device data.    Electronically Signed By: RAHUL Kennedy CRNA  June 5, 2025  3:05 PM  "

## 2025-06-05 NOTE — ANESTHESIA POSTPROCEDURE EVALUATION
"Patient: Phoebe Alva    Procedure: Procedure(s):  RIGHT Re-Excisional Segmental Mastectomy (=\"Lumpectomy\")       Anesthesia Type:  General    Note:  Disposition: Outpatient   Postop Pain Control: Uneventful            Sign Out: Well controlled pain   PONV: No   Neuro/Psych: Uneventful            Sign Out: Acceptable/Baseline neuro status   Airway/Respiratory: Uneventful            Sign Out: Acceptable/Baseline resp. status   CV/Hemodynamics: Uneventful            Sign Out: Acceptable CV status; No obvious hypovolemia; No obvious fluid overload   Other NRE: NONE   DID A NON-ROUTINE EVENT OCCUR? No           Last vitals:  Vitals Value Taken Time   BP 98/64 06/05/25 15:10   Temp 36.1  C (97  F) 06/05/25 15:10   Pulse 66 06/05/25 15:10   Resp 16 06/05/25 15:10   SpO2 94 % 06/05/25 15:10       Electronically Signed By: Beverly Xiao MD  June 5, 2025  4:57 PM  "

## 2025-06-05 NOTE — ANESTHESIA PROCEDURE NOTES
Pectoralis Procedure Note    Pre-Procedure   Staff -        Anesthesiologist:  Boaz Woods MD       Resident/Fellow: Candido Granger MD       Performed By: resident       Location: pre-op       Pre-Anesthestic Checklist: patient identified, IV checked, site marked, risks and benefits discussed, informed consent, monitors and equipment checked, pre-op evaluation, at physician/surgeon's request and post-op pain management  Timeout:       Correct Patient: Yes        Correct Procedure: Yes        Correct Site: Yes        Correct Position: Yes        Correct Laterality: Yes        Site Marked: Yes  Procedure Documentation  Procedure: Pectoralis  Pectoralis II thoracic plane block         Diagnosis: POST OP PAIN CONTROL       Laterality: right       Patient Position: supine       Skin prep: Chloraprep       Needle Type: insulated       Needle Gauge: 21.        Needle Length (Inches): 3.13        Ultrasound guided       1. Ultrasound was used to identify targeted nerve, plexus, vascular marker, or fascial plane and place a needle adjacent to it in real-time.       2. Ultrasound was used to visualize the spread of anesthetic in close proximity to the above referenced structure.       3. A permanent image is entered into the patient's record.       4. The visualized anatomic structures appeared normal.       5. There were no apparent abnormal pathologic findings.    Assessment/Narrative         The placement was negative for: blood aspirated, painful injection and site bleeding       Paresthesias: No.       Insertion/Infusion Method: Single Shot       Complications: none    Medication(s) Administered   Bupivacaine 0.25% PF (Infiltration) - Infiltration   10 mL - 6/5/2025 2:14:00 PM  Bupivacaine liposome (Exparel) 1.3% LA inj susp (Infiltration) - Infiltration   20 mL - 6/5/2025 2:14:00 PM   Comments:  266mg of liposomal bupivacaine,           FOR [facility] ([facility]) ONLY:   Pain Team Contact information: please page  "the Pain Team Via [facility]. Search \"Pain\". During daytime hours, please page the attending first. At night please page the resident first.      "

## 2025-06-05 NOTE — DISCHARGE INSTRUCTIONS
"[facility]  Home Care Following Anesthesia    Tylenol 975 mg given at 1:30PM.   Ok to take more after 7:30PM.    For 24 hours after surgery:  Get plenty of rest.  A responsible adult must stay with you for at least 24 hours after you leave the surgery center.  Do not drive or use heavy equipment.  If you have weakness or tingling, don't drive or use heavy equipment until this feeling goes away.   Do not drink alcohol.   Avoid strenuous or risky activities.  Ask for help when climbing stairs.  You may feel lightheaded.  IF so, sit for a few minutes before standing.  Have someone help you get up.   If you have nausea (feel sick to your stomach): Drink only clear liquids such as apple juice, ginger ale, broth or 7-Up.  Rest may also help.  Be sure to drink enough fluids.  Move to a regular diet as you feel able.   You may have a slight fever.  Call the doctor if your fever is over 100 F (37.7 C) (taken under the tongue) or lasts longer than 24 hours.  You may have a dry mouth, a sore throat, muscle aches or trouble sleeping. These should go away after 24 hours.  Do not make important or legal decisions.   It is recommended to avoid smoking.        Today you received a Marcaine or bupivacaine block to numb the nerves near your surgery site.  This is a block using local anesthetic or \"numbing\" medication injected around the nerves to anesthetize or \"numb\" the area supplied by those nerves.  This block is injected into the muscle layer near your surgical site.  The medication may numb the location where you had surgery for 6-18 hours, but may last up to 24 hours.  If your surgical site is an arm or leg you should be careful with your affected limb, since it is possible to injure your limb without being aware of it due to the numbing.  Until full feeling returns, you should guard against bumping or hitting your limb, and avoid extreme hot or cold temperatures on the skin.  As the block " wears off, the feeling will return as a tingling or prickly sensation near your surgical site.  You will experience more discomfort from your incision as the feeling returns.  You may want to take a pain pill (a narcotic or Tylenol if this was prescribed by your surgeon) when you start to experience mild pain before the pain beccomes more severe.  If your pain medications do not control your pain you should notifiy your surgeon.    Tips for taking pain medications  To get the best pain relief possible, remember these points:  Take pain medications as directed, before pain becomes severe.  Pain medication can upset your stomach: taking it with food may help.  Constipation is a common side effect of pain medication. Drink plenty of  fluids.  Eat foods high in fiber. Take a stool softener if recommended by your doctor or pharmacist.  Do not drink alcohol, drive or operate machinery while taking pain medications.  Ask about other ways to control pain, such as with heat, ice or relaxation.    Tylenol/Acetaminophen Consumption    If you feel your pain relief is insufficient, you may take Tylenol/Acetaminophen in addition to your narcotic pain medication.   Be careful not to exceed 4,000 mg of Tylenol/Acetaminophen in a 24 hour period from all sources.  If you are taking extra strength Tylenol/acetaminophen (500 mg), the maximum dose is 8 tablets in 24 hours.  If you are taking regular strength acetaminophen (325 mg), the maximum dose is 12 tablets in 24 hours.    Call a doctor for any of the following:  Signs of infection (fever, growing tenderness at the surgery site, a large amount of drainage or bleeding, severe pain, foul-smelling drainage, redness, swelling).  It has been over 8 to 10 hours since surgery and you are still not able to urinate (pass water).  Headache for over 24 hours.  Numbness, tingling or weakness the day after surgery (if you had spinal anesthesia).  Signs of Covid-19 infection (temperature over  100 degrees, shortness of breath, cough, loss of taste/smell, generalized body aches, persistent headache, chills, sore throat, nausea/vomiting/diarrhea)    Your doctor is:  Dr. Isi Larios, Breast Center: [phone]                    After hours and weekends call the hospital @ [phone] and ask for the resident on call for:  [facility]  For emergency care, call the:  [facility]:  [phone] (TTY for hearing impaired: [phone])

## 2025-06-08 ENCOUNTER — MYC MEDICAL ADVICE (OUTPATIENT)
Dept: ONCOLOGY | Facility: CLINIC | Age: 40
End: 2025-06-08
Payer: COMMERCIAL

## 2025-06-09 NOTE — TELEPHONE ENCOUNTER
"Oncology Nurse Triage    Situation:   Phoebe reporting the following symptoms:  Rash around breast surgical site.     Background:   Treating Provider:   Dr. Larios    Date of last office visit: procedure 6/5/2025     Recent Treatments:RIGHT Re-Excisional Segmental Mastectomy (=\"Lumpectomy\")     Assessment:     Onset: observed during surgery.     Was told to monitor area.     Skin is still intact.   Skin is still smooth    Rash is itchy such as a mosquito bite.      Pt is wearing a bra 24hours.     Denies fever, chest pain, SOB.     Recommendations:   Routed picture to surgical team. Pt will apply Vanicream to rash site to see if helps with dryness/relief to site   Pt will tap or rub gentle if gets itchy and not scratch.  Will ear gloves or socks over hand to prevent from itching more.  Will wait to hear from surgical care team tomorrow to advise if any concern for infection (such as yeast) or if can somehow let skin be left open to air a bit more since currently wearing 24hr bra.            "

## 2025-06-09 NOTE — CONFIDENTIAL NOTE
Called patient to follow up on symptoms sent through a [employer] message on 6/9/25. Left a voicemail message on 6/9/25 requesting the patient call [phone], option 5, option 2 and speak with any Triage nurse available.

## 2025-06-09 NOTE — TELEPHONE ENCOUNTER
"Called patient to follow up on symptoms sent through a [employer] message. Left additional voicemail message on 6/9 @ 2784,  requesting the patient call [phone], option 5, option 2 and speak with any Triage nurse available.     Treating Provider:  Dr. Isi Larios    Date of last office visit: 5/29/25     Recent treatments: Yes: 6/5/25   RIGHT Re-Excisional Segmental Mastectomy (=\"Lumpectomy\") (Right: Breast)     "

## 2025-06-10 NOTE — PROGRESS NOTES
TELEPHONE CALL  Jun 8, 2025    Phoebe Alva is a 40 year old female s/p right reexcision lumpectomy 6/5/25 with Dr. Larios.     She developed a pruritic rash around the surgical site. Images were review, no sign of cellulitis or hematoma. Recommend benadryl and zyrtec prn. She was called, there was no answer and a message was left.   Follow up appointment scheduled on 6/26/25 with Dr. Larios.    Gardenia Gutiérrez PA-C

## 2025-06-11 ENCOUNTER — ONCOLOGY VISIT (OUTPATIENT)
Dept: ONCOLOGY | Facility: CLINIC | Age: 40
End: 2025-06-11
Attending: HOSPITALIST
Payer: COMMERCIAL

## 2025-06-11 VITALS
DIASTOLIC BLOOD PRESSURE: 62 MMHG | WEIGHT: 184.8 LBS | OXYGEN SATURATION: 98 % | RESPIRATION RATE: 16 BRPM | SYSTOLIC BLOOD PRESSURE: 112 MMHG | BODY MASS INDEX: 25.06 KG/M2 | TEMPERATURE: 97.8 F | HEART RATE: 74 BPM

## 2025-06-11 DIAGNOSIS — Z17.0 MALIGNANT NEOPLASM OF UPPER-OUTER QUADRANT OF RIGHT BREAST IN FEMALE, ESTROGEN RECEPTOR POSITIVE (H): Primary | ICD-10-CM

## 2025-06-11 DIAGNOSIS — C50.411 MALIGNANT NEOPLASM OF UPPER-OUTER QUADRANT OF RIGHT BREAST IN FEMALE, ESTROGEN RECEPTOR POSITIVE (H): Primary | ICD-10-CM

## 2025-06-11 PROCEDURE — 99213 OFFICE O/P EST LOW 20 MIN: CPT | Performed by: HOSPITALIST

## 2025-06-11 PROCEDURE — 99214 OFFICE O/P EST MOD 30 MIN: CPT | Performed by: HOSPITALIST

## 2025-06-11 PROCEDURE — G2211 COMPLEX E/M VISIT ADD ON: HCPCS | Performed by: HOSPITALIST

## 2025-06-11 ASSESSMENT — PAIN SCALES - GENERAL: PAINLEVEL_OUTOF10: NO PAIN (0)

## 2025-06-11 NOTE — NURSING NOTE
Oncology Rooming Note    June 11, 2025 8:01 AM   Phoebe Alva is a 40 year old female who presents for:    Chief Complaint   Patient presents with    Oncology Clinic Visit     Malignant neoplasm of upper-outer quadrant of right breast in female, estrogen receptor positive      Initial Vitals: LMP 05/12/2025 (Approximate)  Estimated body mass index is 24.68 kg/m  as calculated from the following:    Height as of 6/5/25: 1.829 m (6').    Weight as of 6/5/25: 82.6 kg (182 lb). There is no height or weight on file to calculate BSA.  No Pain (0) Comment: Data Unavailable   Patient's last menstrual period was 05/12/2025 (approximate).  Allergies reviewed: Yes  Medications reviewed: Yes    Medications: Medication refills not needed today.  Pharmacy name entered into [employer]: [id number] - [place], [place] - [address]    Frailty Screening:   Is the patient here for a new oncology consult visit in cancer care? 2. No    PHQ9:  Did this patient require a PHQ9?: No      Clinical concerns: none       Katharina Durand

## 2025-06-11 NOTE — LETTER
6/11/2025      Phoebe Alva  [address] [facility] [address]      Dear Colleague,    Thank you for referring your patient, Phoebe Alva, to the [facility]. Please see a copy of my visit note below.     [facility]   Return Patient Visit    Name: Phoebe Alva  MRN: [id number]  Date of visit: Jun 11, 2025    Diagnosis: R breast cancer-ER+/MD+/ER2 2+ (group 5 negative)  Stage:    Cancer Staging   Malignant neoplasm of overlapping sites of right breast in female, estrogen receptor positive (H)  Staging form: Breast, AJCC 8th Edition  - Clinical stage from 3/24/2025: Stage IB (cT2, cN0, cM0, G1, ER+, MD+, HER2: Equivocal) - Signed by Jessika Morales MD on 3/24/2025  - Pathologic: Stage IA (pT2, pN1a, cM0, G1, ER+, MD+, HER2-) - Unsigned      Performance status: ECOG 0    Oncology History:   -3/14/25: New diagnosis of invasive ductal carcinoma, grade 1 with DCIS-ER and MD strongly positive, HER2 2+ (group 5 negative)-[id number]      HPI:   Phoebe Alva is a 40 year old healthy female who presents with a new diagnosis of R sided breast cancer. She noted a palpable mass in her right breast during the last week of February and messaged her PCP. She underwent a diagnostic mammogram and ultrasound on 3/13/25 showed a spiculated mass in the right upper outer quadrant with ultrasound showing a 1.9x3.6x1.7cm irregular mass. No suspicious axillary LN. L breast normal. Biopsy revealed invasive ductal carcinoma, grade 1 with DCIS (grade 1 cribriform type) with ER and MD strongly positive (both %) and hER2 2+ group 5 negative. One LN was negative for carcinoma.    Of note, Phoebe has minimal past medical history and recently developed some LLQ pain for which she underwent CT A/P showing several enlarged mesenteric lymph nodes, mild splenomegaly and 2 liver lesions most consistent with benign hemangiomas. Follow up pelvic ultrasound showed a likely  "endometrioma.       Interval History:   Phoebe underwent breast surgery on 5/13 but had positive margins and underwent re-excision on 6/5/25. Both with Dr Larios.   She is generally feeling well and the surgeries were pretty easy.  No pain at the surgical site. She does feel some tightness in the axilla after the second surgery. Additionally, her genetic testing was negative. Mammaprint was low risk +0.264. She also saw Dr. Vega of OBGYN  for endometriosis and is planning oophorectomy for a benign endometrioma.    Exam:   LMP 05/12/2025 (Approximate)     Gen: well appearing today  Breast: Well healed surgical excision and SLN scar. She has a small tissue/cord-like area in the axilla.      Labs:   Reviewed in chart. Key findings include   Lab Results   Component Value Date    WBC 11.4 01/09/2025     Lab Results   Component Value Date    RBC 4.69 01/09/2025     Lab Results   Component Value Date    HGB 13.4 01/09/2025     Lab Results   Component Value Date    HCT 40.4 01/09/2025     No components found for: \"MCT\"  Lab Results   Component Value Date    MCV 86 01/09/2025     Lab Results   Component Value Date    MCH 28.6 01/09/2025     Lab Results   Component Value Date    MCHC 33.2 01/09/2025     Lab Results   Component Value Date    RDW 12.1 01/09/2025     Lab Results   Component Value Date     01/09/2025        Imaging:   All pertinent imaging studies personally reviewed, martin findings included in oncology history above    FINDINGS  MAMMOGRAM:  BREAST DENSITY: The breasts are heterogeneously dense, which may  obscure small masses.     Technique: Standard mammographic views were performed with  tomosynthesis and synthetic mammogram   Minimal background parenchymal enhancement. No suspicious enhancement  in the left breast.  The suspicious right breast cancer has rim enhancement with a probable  necrotic center measuring 3.6 x 2.2 x 2.1 cm.                                                                    "   IMPRESSION: BI-RADS CATEGORY: 5 - Highly Suggestive of Malignancy.     RECOMMENDED FOLLOW-UP: Biopsy.  Right breast ultrasound-guided biopsy.  Right axillary biopsy.     The finding and recommendation were discussed with the patient at the  time of evaluation.     The results of the examination will be sent to the patient.       Pathology:   Reviewed in chart, key findings included in history above   A. Lymph node, RIGHT axillary, sentinel #1, excision:  -One benign lymph node (0/1)  -Prior core biopsy site changes     B. Lymph node, RIGHT axillary, sentinel #2, excision:  -METASTATIC BREAST DUCTAL CARCINOMA in one of one lymph node (1/1)  -Metastasis is 5.5 mm in size  -No extranodal extension is identified  -Tumor emboli in perinodal lymphatics   -Metastatic carcinoma is estrogen receptor positive, progesterone receptor positive, and HER2 negative (score 1+) by immunohistochemistry (performed on this specimen, see 'Breast Biomarker Reporting Template' below)  -See comment     C. Lymph node, RIGHT axillary, sentinel #3, excision:  -One benign lymph node (0/1)     D. Lymph node, RIGHT axillary, sentinel #4, excision:  -One benign lymph node (0/1)     E. RIGHT breast, upper outer quadrant, segmental mastectomy:  -INVASIVE BREAST CARCINOMA OF NO SPECIAL TYPE (INVASIVE DUCTAL CARCINOMA), TRICIA GRADE 1, size 34 mm  -Ductal carcinoma in situ (DCIS), nuclear grade 2, cribriform, solid, and micropapillary type(s), with focal necrosis  -DCIS is admixed with and extends beyond invasive carcinoma, to span an estimated 60 mm  -Lymphovascular invasion present, extensive  -Lateral margin is focally involved by invasive carcinoma  -Invasive carcinoma is 2 mm from the superior margin and > 5 mm from the anterior, posterior, inferior and medial margins   -Margins are uninvolved by DCIS  -DCIS is 1.4 mm from the nearest (medial) margin, 1.5 mm from the lateral margin, 3 mm from the posterior margin, 5 mm from the superior  margin and >5 mm from the anterior and inferior margins   -Other findings: fibrocystic change (including microcysts), columnar cell change, fibroadenoma (4 mm) and collagenous spherulosis  -Calcifications associated with DCIS  -Prior core biopsy site changes   -Invasive carcinoma is estrogen receptor positive (%, strong intensity), progesterone receptor positive (%, strong intensity), and HER2 equivocal (score 2+) by immunohistochemistry and is HER2 non-amplified (group 5) by FISH (performed on prior core biopsy, see report [id number] specimen A)  -See microscopic description  -See tumor synoptic below     Assessment and Plan:   Phoebe Alva is a 40 year old female with a new diagnosis of hormone receptor positive right breast cancer.      # T2N0 Right Breast Cancer (Stage IB)  -Mammaprint low risk +0.264  -ER/MD strongly positive, HER2 negative (2+, group 5 negative), grade 1  -underwent initial surgeron on 5/13/25 with path showing 3.4cm IDC, grade 1 with positive margins. Also extensive DCIS measuring 6cm. LVI is present. 1/4 positive LN of 5.5mm in size. Tumor emboli noted and no CARLIE.  -we will await her final path report, if margin involved she could need mastectomy  -saw Rad Onc and discussed axillary and breast radiation  -we discussed systemic treatment options including tamoxifen for at least 5 years vs OFS. Tamoxifen for 10 years or OFS could serve as more aggressive strategies than Tamoxifen x 5 years alone. We discussed that she does not have a high risk mammaprint but is node +, +LVI and young so OFS could potentially offer her benefit/ is reasonable but comes with more side effects than tamoxifen alone.She is already undergoing an oophorectomy on the right for a painful endometrioma and prefers to have bilateral oophorectomy. I will alert Dr. Vega of this plan. We will plan for ovarian suppression and then add in aromatase inhibitor after 2-3 months.  -DEXA scan when  able    Fertility:  -no plan for children  -partner has vasectomy for contraception    LLQ pain:  -resolved, likely endometrioma seen on ultrasound  -CT A/P with enlarged mesenteric Lns and US with endometrioma (<1% chance of malignancy)    Endometrioma:  -cystic lesion with <1% chance of malignancy  -genetic testing pending  -repeat us in 12 months    Hepatic lesions:  -most consistent with hemangiomas    Plan:  -follow up final pathology  -discuss with rad onc  -bilateral oophorectomy with Dr. Vega  -return in 1 month for visit with me    Jessika Morales MD   of Medicine  Division of Hematology, Oncology and Transplantation    ---  30 minutes were spent on the date of the encounter performing chart review, history and exam, documentation, and further activities as noted above.      The longitudinal plan of care for the diagnosis(es)/condition(s) as documented were addressed during this visit. Due to the added complexity in care, I will continue to support Phoebe in the subsequent management and with ongoing continuity of care.         Again, thank you for allowing me to participate in the care of your patient.        Sincerely,        Jessika Morales MD    Electronically signed

## 2025-06-11 NOTE — PROGRESS NOTES
[facility]   Return Patient Visit    Name: Phoebe Alva  MRN: [id number]  Date of visit: Jun 11, 2025    Diagnosis: R breast cancer-ER+/SD+/ER2 2+ (group 5 negative)  Stage:    Cancer Staging   Malignant neoplasm of overlapping sites of right breast in female, estrogen receptor positive (H)  Staging form: Breast, AJCC 8th Edition  - Clinical stage from 3/24/2025: Stage IB (cT2, cN0, cM0, G1, ER+, SD+, HER2: Equivocal) - Signed by Jessika Morales MD on 3/24/2025  - Pathologic: Stage IA (pT2, pN1a, cM0, G1, ER+, SD+, HER2-) - Unsigned      Performance status: ECOG 0    Oncology History:   -3/14/25: New diagnosis of invasive ductal carcinoma, grade 1 with DCIS-ER and SD strongly positive, HER2 2+ (group 5 negative)-[id number]      HPI:   Phoebe Alva is a 40 year old healthy female who presents with a new diagnosis of R sided breast cancer. She noted a palpable mass in her right breast during the last week of February and messaged her PCP. She underwent a diagnostic mammogram and ultrasound on 3/13/25 showed a spiculated mass in the right upper outer quadrant with ultrasound showing a 1.9x3.6x1.7cm irregular mass. No suspicious axillary LN. L breast normal. Biopsy revealed invasive ductal carcinoma, grade 1 with DCIS (grade 1 cribriform type) with ER and SD strongly positive (both %) and hER2 2+ group 5 negative. One LN was negative for carcinoma.    Of note, Phoebe has minimal past medical history and recently developed some LLQ pain for which she underwent CT A/P showing several enlarged mesenteric lymph nodes, mild splenomegaly and 2 liver lesions most consistent with benign hemangiomas. Follow up pelvic ultrasound showed a likely endometrioma.       Interval History:   Phoebe underwent breast surgery on 5/13 but had positive margins and underwent re-excision on 6/5/25. Both with Dr Larios.   She is generally feeling well and the surgeries were pretty easy.  No pain at the  "surgical site. She does feel some tightness in the axilla after the second surgery. Additionally, her genetic testing was negative. Mammaprint was low risk +0.264. She also saw Dr. Vega of OBGYN  for endometriosis and is planning oophorectomy for a benign endometrioma.    Exam:   LMP 05/12/2025 (Approximate)     Gen: well appearing today  Breast: Well healed surgical excision and SLN scar. She has a small tissue/cord-like area in the axilla.      Labs:   Reviewed in chart. Key findings include   Lab Results   Component Value Date    WBC 11.4 01/09/2025     Lab Results   Component Value Date    RBC 4.69 01/09/2025     Lab Results   Component Value Date    HGB 13.4 01/09/2025     Lab Results   Component Value Date    HCT 40.4 01/09/2025     No components found for: \"MCT\"  Lab Results   Component Value Date    MCV 86 01/09/2025     Lab Results   Component Value Date    MCH 28.6 01/09/2025     Lab Results   Component Value Date    MCHC 33.2 01/09/2025     Lab Results   Component Value Date    RDW 12.1 01/09/2025     Lab Results   Component Value Date     01/09/2025        Imaging:   All pertinent imaging studies personally reviewed, martin findings included in oncology history above    FINDINGS  MAMMOGRAM:  BREAST DENSITY: The breasts are heterogeneously dense, which may  obscure small masses.     Technique: Standard mammographic views were performed with  tomosynthesis and synthetic mammogram   Minimal background parenchymal enhancement. No suspicious enhancement  in the left breast.  The suspicious right breast cancer has rim enhancement with a probable  necrotic center measuring 3.6 x 2.2 x 2.1 cm.                                                                      IMPRESSION: BI-RADS CATEGORY: 5 - Highly Suggestive of Malignancy.     RECOMMENDED FOLLOW-UP: Biopsy.  Right breast ultrasound-guided biopsy.  Right axillary biopsy.     The finding and recommendation were discussed with the patient at the  time of " evaluation.     The results of the examination will be sent to the patient.       Pathology:   Reviewed in chart, key findings included in history above   A. Lymph node, RIGHT axillary, sentinel #1, excision:  -One benign lymph node (0/1)  -Prior core biopsy site changes     B. Lymph node, RIGHT axillary, sentinel #2, excision:  -METASTATIC BREAST DUCTAL CARCINOMA in one of one lymph node (1/1)  -Metastasis is 5.5 mm in size  -No extranodal extension is identified  -Tumor emboli in perinodal lymphatics   -Metastatic carcinoma is estrogen receptor positive, progesterone receptor positive, and HER2 negative (score 1+) by immunohistochemistry (performed on this specimen, see 'Breast Biomarker Reporting Template' below)  -See comment     C. Lymph node, RIGHT axillary, sentinel #3, excision:  -One benign lymph node (0/1)     D. Lymph node, RIGHT axillary, sentinel #4, excision:  -One benign lymph node (0/1)     E. RIGHT breast, upper outer quadrant, segmental mastectomy:  -INVASIVE BREAST CARCINOMA OF NO SPECIAL TYPE (INVASIVE DUCTAL CARCINOMA), TRICIA GRADE 1, size 34 mm  -Ductal carcinoma in situ (DCIS), nuclear grade 2, cribriform, solid, and micropapillary type(s), with focal necrosis  -DCIS is admixed with and extends beyond invasive carcinoma, to span an estimated 60 mm  -Lymphovascular invasion present, extensive  -Lateral margin is focally involved by invasive carcinoma  -Invasive carcinoma is 2 mm from the superior margin and > 5 mm from the anterior, posterior, inferior and medial margins   -Margins are uninvolved by DCIS  -DCIS is 1.4 mm from the nearest (medial) margin, 1.5 mm from the lateral margin, 3 mm from the posterior margin, 5 mm from the superior margin and >5 mm from the anterior and inferior margins   -Other findings: fibrocystic change (including microcysts), columnar cell change, fibroadenoma (4 mm) and collagenous spherulosis  -Calcifications associated with DCIS  -Prior core biopsy site  changes   -Invasive carcinoma is estrogen receptor positive (%, strong intensity), progesterone receptor positive (%, strong intensity), and HER2 equivocal (score 2+) by immunohistochemistry and is HER2 non-amplified (group 5) by FISH (performed on prior core biopsy, see report [id number] specimen A)  -See microscopic description  -See tumor synoptic below     Assessment and Plan:   Phoebe Alva is a 40 year old female with a new diagnosis of hormone receptor positive right breast cancer.      # T2N0 Right Breast Cancer (Stage IB)  -Mammaprint low risk +0.264  -ER/AR strongly positive, HER2 negative (2+, group 5 negative), grade 1  -underwent initial surgeron on 5/13/25 with path showing 3.4cm IDC, grade 1 with positive margins. Also extensive DCIS measuring 6cm. LVI is present. 1/4 positive LN of 5.5mm in size. Tumor emboli noted and no CARLIE.  -we will await her final path report, if margin involved she could need mastectomy  -saw Rad Onc and discussed axillary and breast radiation  -we discussed systemic treatment options including tamoxifen for at least 5 years vs OFS. Tamoxifen for 10 years or OFS could serve as more aggressive strategies than Tamoxifen x 5 years alone. We discussed that she does not have a high risk mammaprint but is node +, +LVI and young so OFS could potentially offer her benefit/ is reasonable but comes with more side effects than tamoxifen alone.She is already undergoing an oophorectomy on the right for a painful endometrioma and prefers to have bilateral oophorectomy. I will alert Dr. Vega of this plan. We will plan for ovarian suppression and then add in aromatase inhibitor after 2-3 months.  -DEXA scan when able    Fertility:  -no plan for children  -partner has vasectomy for contraception    LLQ pain:  -resolved, likely endometrioma seen on ultrasound  -CT A/P with enlarged mesenteric Lns and US with endometrioma (<1% chance of malignancy)    Endometrioma:  -cystic  lesion with <1% chance of malignancy  -genetic testing pending  -repeat us in 12 months    Hepatic lesions:  -most consistent with hemangiomas    Plan:  -follow up final pathology  -discuss with rad onc  -bilateral oophorectomy with Dr. Vega  -return in 1 month for visit with me    Jessika Morales MD   of Medicine  Division of Hematology, Oncology and Transplantation    ---  30 minutes were spent on the date of the encounter performing chart review, history and exam, documentation, and further activities as noted above.      The longitudinal plan of care for the diagnosis(es)/condition(s) as documented were addressed during this visit. Due to the added complexity in care, I will continue to support Phoebe in the subsequent management and with ongoing continuity of care.

## 2025-06-16 ENCOUNTER — PREP FOR PROCEDURE (OUTPATIENT)
Dept: OBGYN | Facility: CLINIC | Age: 40
End: 2025-06-16
Payer: COMMERCIAL

## 2025-06-30 NOTE — PROGRESS NOTES
Phoebe is a 39 yo female with  with an early stage right breast cancer. She is status post lumpectomy and SLN Bx. Pathology showed:    Invasive ductal carcinoma  [place] grade 1  3.4 cm in greatest dimension  DCIS, nuclear grade 2, cribriform, solid and micropapillary types with focal necrosis, spanning 6.0 cm  Extensive LVSI was present.   Margins: invasive carcinoma was focally present at the lateral margin; closest DCIS margin was 1.4 medial.   R SLN: 1/4, 5.5 mm with no DIDI; however, tumor emboli was present in perinodal lymphatics, ER+, TX+, HER2    Since last seen, Phoebe proceeded with re-excision for margin on 6/5/2025. A new lateral margin was taken, which showed no residual carcinoma.     Her Mammaprint is low risk.     She followed up with Dr. Morales on 6/11/2025. She will undergo bilateral oophorectomies and be started on an AI.       On interview today, she reports healing well from her reexcision. She has excellent range of motion in her right shoulder.      PLAN:  Proceed with simulation today.   Plan a course of 42.5 Gy in 16 fractions to the right breast and axilla level I/II with consideration of comprehensive jonathon irradiation depending on the doses to her normal tissues.

## 2025-07-01 ENCOUNTER — OFFICE VISIT (OUTPATIENT)
Dept: RADIATION ONCOLOGY | Facility: CLINIC | Age: 40
End: 2025-07-01
Attending: RADIOLOGY
Payer: COMMERCIAL

## 2025-07-01 DIAGNOSIS — C50.811 MALIGNANT NEOPLASM OF OVERLAPPING SITES OF RIGHT BREAST IN FEMALE, ESTROGEN RECEPTOR POSITIVE (H): Primary | ICD-10-CM

## 2025-07-01 DIAGNOSIS — Z17.0 MALIGNANT NEOPLASM OF OVERLAPPING SITES OF RIGHT BREAST IN FEMALE, ESTROGEN RECEPTOR POSITIVE (H): Primary | ICD-10-CM

## 2025-07-01 PROCEDURE — 77290 THER RAD SIMULAJ FIELD CPLX: CPT | Performed by: RADIOLOGY

## 2025-07-01 PROCEDURE — 77334 RADIATION TREATMENT AID(S): CPT | Performed by: RADIOLOGY

## 2025-07-01 NOTE — PROGRESS NOTES
[facility] [address] [address] [phone]               Thank you for choosing us for your health care visit with CINDY Trevino.   We are glad to serve you and happy to provide you with this summar Radiation Therapy Patient Education    Person involved with teaching: Patient and     Patient educational needs for self management of treatment-related side effects assessment completed.  [facility] Patient Ed tab contains Patient Learning Assessment    Education Materials Given  Radiation Therapy and You and Skin Care During Radiation Treatment  Treatment schedule    parking pass     Educational Topics Discussed  Side effects expected, Pain management, Skin care, and When to call MD/RN    Response To Teaching  Verbalizes understanding    GYN Only  Vaginal Dilator-given and educated: N/A    Referrals sent: None    Chemotherapy?  No     [employer] will allow you to access patient instructions from your recent visit,  view other health information, and more. To sign up or find more information, go to [email or web address] and click on the Sign Up Now link in the [employer] box.      Enter

## 2025-07-01 NOTE — LETTER
7/1/2025      Phoebe Alva  [address] [facility] [address]      Dear Colleague,    Thank you for referring your patient, Phoebe Alva, to the [facility] RADIATION ONCOLOGY. Please see a copy of my visit note below.    Radiation Therapy Patient Education    Person involved with teaching: Patient and     Patient educational needs for self management of treatment-related side effects assessment completed.  [facility] Patient Ed tab contains Patient Learning Assessment    Education Materials Given  Radiation Therapy and You and Skin Care During Radiation Treatment  Treatment schedule    parking pass     Educational Topics Discussed  Side effects expected, Pain management, Skin care, and When to call MD/RN    Response To Teaching  Verbalizes understanding    GYN Only  Vaginal Dilator-given and educated: N/A    Referrals sent: None    Chemotherapy?  No      Again, thank you for allowing me to participate in the care of your patient.        Sincerely,        Fermin Rose MD    Electronically signed

## 2025-07-01 NOTE — PROGRESS NOTES
Patient presents for a brief follow-up with Dr. Rose to review and sign a radiology consent form prior to proceeding with CT simulation later this morning. Of note, consult with Dr. Rose completed 5/29/25. RN assessment not indicated/not completed at this time. Please refer to provider notes.     Dorita Albert, ALIYAH  Radiation Oncology

## 2025-07-01 NOTE — LETTER
7/1/2025      Phoebe Alva  [address] [facility] [address]      Dear Colleague,    Thank you for referring your patient, Phoebe Alva, to the [facility] RADIATION ONCOLOGY. Please see a copy of my visit note below.    Phoebe is a 41 yo female with  with an early stage right breast cancer. She is status post lumpectomy and SLN Bx. Pathology showed:    Invasive ductal carcinoma  [place] grade 1  3.4 cm in greatest dimension  DCIS, nuclear grade 2, cribriform, solid and micropapillary types with focal necrosis, spanning 6.0 cm  Extensive LVSI was present.   Margins: invasive carcinoma was focally present at the lateral margin; closest DCIS margin was 1.4 medial.   R SLN: 1/4, 5.5 mm with no DIDI; however, tumor emboli was present in perinodal lymphatics, ER+, MS+, HER2    Since last seen, Phoebe proceeded with re-excision for margin on 6/5/2025. A new lateral margin was taken, which showed no residual carcinoma.     Her Mammaprint is low risk.     She followed up with Dr. Morales on 6/11/2025. She will undergo bilateral oophorectomies and be started on an AI.       On interview today, she reports healing well from her reexcision. She has excellent range of motion in her right shoulder.      PLAN:  Proceed with simulation today.   Plan a course of 42.5 Gy in 16 fractions to the right breast and axilla level I/II with consideration of comprehensive jonathon irradiation depending on the doses to her normal tissues.        Patient presents for a brief follow-up with Dr. Rose to review and sign a radiology consent form prior to proceeding with CT simulation later this morning. Of note, consult with Dr. Rose completed 5/29/25. RN assessment not indicated/not completed at this time. Please refer to provider notes.     Dorita Albert, ALIYAH  Radiation Oncology       Again, thank you for allowing me to participate in the care of your patient.        Sincerely,        Fermin Rose MD    Electronically signed

## 2025-07-02 NOTE — PROGRESS NOTES
[facility] [address] [address]  Phone: [phone]  Fax: [phone]    PATIENT NAME:  Phoebe Alva  PATIENT YOB: 1985    FOLLOW-UP  Jul 3, 2025    Phoebe Alva is a 40 year old female who returns for her post-operative follow-up visit.     Cancer Staging   Malignant neoplasm of overlapping sites of right breast in female, estrogen receptor positive (H)  Staging form: Breast, AJCC 8th Edition  - Clinical stage from 3/24/2025: Stage IB (cT2, cN0, cM0, G1, ER+, MS+, HER2: Equivocal) - Signed by Jessika Morales MD on 3/24/2025  - Pathologic: Stage IA (pT2, pN1a, cM0, G1, ER+, MS+, HER2-) - Unsigned    Treatment to date:  Mammaprint - low risk  Genetic testing - negative for pathogenic variants in: ALK, APC, MAGGY, AXIN2, BAP1, BARD1, BLM, BMPR1A, BRCA1, BRCA2, BRIP1, CASR, CDC73, CDH1, CDK4, CDKN1B, CDKN1C, CDKN2A, CEBPA, CHEK2, CTNNA1, DICER1, DIS3L2, EGFR, EPCAM, FH, FLCN, GATA2, GPC3, GREM1, HOXB13, HRAS, KIT, MAX, MBD4, MEN1, MET, MITF, MLH1, MLH3, MRE11, MSH2, MSH3, MSH6, MUTYH, NBN, NF1, NF2, NTHL1, PALB2, PDGFRA, PHOX2B, PMS2, POLD1, POLE, POT1, DWKKA7V, PTCH1, PTCH2, PTEN, RAD50, RAD51C, RAD51D, RB1, RECQL4, RET, RUNX1, SDHA, SDHAF2, SDHB, SDHC, SDHD, SMAD4, SMARCA4, SMARCB1, SMARCE1, STK11, SUFU, TERC, TERT, SOAU917, TP53, TSC1, TSC2, VHL, WRN, WT1   Right segmental mastectomy and right axillary sentinel lymph node mapping and biopsy (5/13/2025)  Right re-excisional segmental mastectomy (6/5/2025)    HPI:    She underwent a right re-excisional segmental mastectomy on 6/5/2025.  She is currently 4 week(s) post-op.  Final surgical pathology showed no residual malignancy.    Since the procedure, she has been doing well. She denies any redness or swelling, or drainage from the incision, or fever/chills.  She has good range of motion and denies any upper extremity swelling. Initially after her second surgery, she did have some  cording and limited shoulder range of motion; this has since improved.    /77 (BP Location: Right arm, Patient Position: Sitting, Cuff Size: Adult Regular)   Pulse 69   Temp 98  F (36.7  C) (Oral)   Resp 18   LMP 06/30/2025 (Approximate)   SpO2 97%    Physical Exam  Constitutional:       Appearance: She is well-developed.   Pulmonary:      Effort: No respiratory distress.   Chest:       Skin:     General: Skin is warm and dry.         INVESTIGATIONS:    Surgical Pathology (6/5/2025):  Final Diagnosis   RIGHT breast, lateral margin, re-excision segmental mastectomy:  -No residual carcinoma identified  -Benign breast tissue with extensive post-operative changes  -Negative for atypia or malignancy      ASSESSMENT:    Phoebe Alva is a 40 year old female with RIGHT breast cancer, s/p surgery.    She is healing well.  I recommended she continue moisturizing and massaging the scar(s) with Vaseline. I recommend a physical therapy referral for the right axillary cording.    We reviewed the pathology today and a copy of the report was provided. No further oncologic surgery is indicated. She has met with Dr Rose in follow up regarding radiation therapy.  She will continue follow up with Dr Morales as well.    All of the above was discussed with the patient and all questions were answered.     PLAN:  Physical therapy referral for right axillary cording  Radiation therapy per Dr Rose  Follow up with Dr Morales as scheduled  Follow up with me ESE Larios MD MS [facility] FACS  Associate Professor of Surgery  Division of Surgical Oncology  [facility]

## 2025-07-03 ENCOUNTER — ONCOLOGY VISIT (OUTPATIENT)
Dept: ONCOLOGY | Facility: CLINIC | Age: 40
End: 2025-07-03
Attending: SURGERY
Payer: COMMERCIAL

## 2025-07-03 VITALS
TEMPERATURE: 98 F | DIASTOLIC BLOOD PRESSURE: 77 MMHG | RESPIRATION RATE: 18 BRPM | SYSTOLIC BLOOD PRESSURE: 117 MMHG | OXYGEN SATURATION: 97 % | HEART RATE: 69 BPM

## 2025-07-03 DIAGNOSIS — Z17.0 MALIGNANT NEOPLASM OF UPPER-OUTER QUADRANT OF RIGHT BREAST IN FEMALE, ESTROGEN RECEPTOR POSITIVE (H): Primary | ICD-10-CM

## 2025-07-03 DIAGNOSIS — C50.411 MALIGNANT NEOPLASM OF UPPER-OUTER QUADRANT OF RIGHT BREAST IN FEMALE, ESTROGEN RECEPTOR POSITIVE (H): Primary | ICD-10-CM

## 2025-07-03 PROCEDURE — 99213 OFFICE O/P EST LOW 20 MIN: CPT | Performed by: SURGERY

## 2025-07-03 ASSESSMENT — PAIN SCALES - GENERAL: PAINLEVEL_OUTOF10: NO PAIN (0)

## 2025-07-03 NOTE — LETTER
7/3/2025      Phoebe Alva  [address] [facility] [address]      Dear Colleague,    Thank you for referring your patient, Phoebe Alva, to the [facility]. Please see a copy of my visit note below.      [facility]  [address] [facility] [address]  Phone: [phone]  Fax: [phone]    PATIENT NAME:  Phoebe Alva  PATIENT YOB: 1985    FOLLOW-UP  Jul 3, 2025    Phoebe Alva is a 40 year old female who returns for her post-operative follow-up visit.     Cancer Staging   Malignant neoplasm of overlapping sites of right breast in female, estrogen receptor positive (H)  Staging form: Breast, AJCC 8th Edition  - Clinical stage from 3/24/2025: Stage IB (cT2, cN0, cM0, G1, ER+, MI+, HER2: Equivocal) - Signed by Jessika Morales MD on 3/24/2025  - Pathologic: Stage IA (pT2, pN1a, cM0, G1, ER+, MI+, HER2-) - Unsigned    Treatment to date:  Mammaprint - low risk  Genetic testing - negative for pathogenic variants in: ALK, APC, MAGGY, AXIN2, BAP1, BARD1, BLM, BMPR1A, BRCA1, BRCA2, BRIP1, CASR, CDC73, CDH1, CDK4, CDKN1B, CDKN1C, CDKN2A, CEBPA, CHEK2, CTNNA1, DICER1, DIS3L2, EGFR, EPCAM, FH, FLCN, GATA2, GPC3, GREM1, HOXB13, HRAS, KIT, MAX, MBD4, MEN1, MET, MITF, MLH1, MLH3, MRE11, MSH2, MSH3, MSH6, MUTYH, NBN, NF1, NF2, NTHL1, PALB2, PDGFRA, PHOX2B, PMS2, POLD1, POLE, POT1, YFYZV3P, PTCH1, PTCH2, PTEN, RAD50, RAD51C, RAD51D, RB1, RECQL4, RET, RUNX1, SDHA, SDHAF2, SDHB, SDHC, SDHD, SMAD4, SMARCA4, SMARCB1, SMARCE1, STK11, SUFU, TERC, TERT, [id number], TP53, TSC1, TSC2, VHL, WRN, WT1   Right segmental mastectomy and right axillary sentinel lymph node mapping and biopsy (5/13/2025)  Right re-excisional segmental mastectomy (6/5/2025)    HPI:    She underwent a right re-excisional segmental mastectomy on 6/5/2025.  She is currently 4 week(s) post-op.  Final surgical pathology showed no residual malignancy.    Since the  procedure, she has been doing well. She denies any redness or swelling, or drainage from the incision, or fever/chills.  She has good range of motion and denies any upper extremity swelling. Initially after her second surgery, she did have some cording and limited shoulder range of motion; this has since improved.    /77 (BP Location: Right arm, Patient Position: Sitting, Cuff Size: Adult Regular)   Pulse 69   Temp 98  F (36.7  C) (Oral)   Resp 18   LMP 06/30/2025 (Approximate)   SpO2 97%    Physical Exam  Constitutional:       Appearance: She is well-developed.   Pulmonary:      Effort: No respiratory distress.   Chest:       Skin:     General: Skin is warm and dry.         INVESTIGATIONS:    Surgical Pathology (6/5/2025):  Final Diagnosis   RIGHT breast, lateral margin, re-excision segmental mastectomy:  -No residual carcinoma identified  -Benign breast tissue with extensive post-operative changes  -Negative for atypia or malignancy      ASSESSMENT:    Phoebe Alva is a 40 year old female with RIGHT breast cancer, s/p surgery.    She is healing well.  I recommended she continue moisturizing and massaging the scar(s) with Vaseline. I recommend a physical therapy referral for the right axillary cording.    We reviewed the pathology today and a copy of the report was provided. No further oncologic surgery is indicated. She has met with Dr Rose in follow up regarding radiation therapy.  She will continue follow up with Dr Morales as well.    All of the above was discussed with the patient and all questions were answered.     PLAN:  Physical therapy referral for right axillary cording  Radiation therapy per Dr Rose  Follow up with Dr Morales as scheduled  Follow up with me ESE Larios MD [facility] FACS  Associate Professor of Surgery  Division of Surgical Oncology  [facility]     Again, thank you for allowing me to participate in the care of your patient.        Sincerely,        Isi Munoz  Ekaterina Larios MD    Electronically signed

## 2025-07-09 ENCOUNTER — OFFICE VISIT (OUTPATIENT)
Dept: FAMILY MEDICINE | Facility: CLINIC | Age: 40
End: 2025-07-09
Payer: COMMERCIAL

## 2025-07-09 VITALS
RESPIRATION RATE: 14 BRPM | DIASTOLIC BLOOD PRESSURE: 75 MMHG | SYSTOLIC BLOOD PRESSURE: 116 MMHG | HEART RATE: 71 BPM | HEIGHT: 72 IN | OXYGEN SATURATION: 98 % | TEMPERATURE: 98.4 F | BODY MASS INDEX: 25.03 KG/M2 | WEIGHT: 184.8 LBS

## 2025-07-09 DIAGNOSIS — C50.411 MALIGNANT NEOPLASM OF UPPER-OUTER QUADRANT OF RIGHT BREAST IN FEMALE, ESTROGEN RECEPTOR POSITIVE (H): ICD-10-CM

## 2025-07-09 DIAGNOSIS — Z01.818 PREOP GENERAL PHYSICAL EXAM: Primary | ICD-10-CM

## 2025-07-09 DIAGNOSIS — N80.129 ENDOMETRIOMA OF OVARY: ICD-10-CM

## 2025-07-09 DIAGNOSIS — Z17.0 MALIGNANT NEOPLASM OF UPPER-OUTER QUADRANT OF RIGHT BREAST IN FEMALE, ESTROGEN RECEPTOR POSITIVE (H): ICD-10-CM

## 2025-07-09 PROCEDURE — 99214 OFFICE O/P EST MOD 30 MIN: CPT | Mod: 24 | Performed by: FAMILY MEDICINE

## 2025-07-09 PROCEDURE — 3078F DIAST BP <80 MM HG: CPT | Performed by: FAMILY MEDICINE

## 2025-07-09 PROCEDURE — 3074F SYST BP LT 130 MM HG: CPT | Performed by: FAMILY MEDICINE

## 2025-07-09 NOTE — PATIENT INSTRUCTIONS
How to Take Your Medication Before Surgery  Preoperative Medication Instructions   Antiplatelet or Anticoagulation Medication Instructions   - We reviewed the medication list and the patient is not on an antiplatelet or anticoagulation medications.    Additional Medication Instructions  We reviewed the medication list and there are no chronic medications that need to be adjusted for this procedure.       Patient Education   Preparing for Your Surgery  For Adults  Getting started  In most cases, a nurse will call to review your health history and instructions. They will give you an arrival time based on your scheduled surgery time. Please be ready to share:  Your doctor's clinic name and phone number  Your medical, surgical, and anesthesia history  A list of allergies and sensitivities  A list of medicines, including herbal treatments and over-the-counter drugs  Whether the patient has a legal guardian (ask how to send us the papers in advance)  Note: You may not receive a call if you were seen at our PAC (Preoperative Assessment Center).  Please tell us if you're pregnant--or if there's any chance you might be pregnant. Some surgeries may injure a fetus (unborn baby), so they require a pregnancy test. Surgeries that are safe for a fetus don't always need a test, and you can choose whether to have one.   Preparing for surgery  Within 10 to 30 days of surgery: Have a pre-op exam (sometimes called an H&P, or History and Physical). This can be done at a clinic or pre-operative center.  If you're having a , you may not need this exam. Talk to your care team.  At your pre-op exam, talk to your care team about all medicines you take. (This includes CBD oil and any drugs, such as THC, marijuana, and other forms of cannabis.) If you need to stop any medicine before surgery, ask when to start taking it again.  This is for your safety. Many medicines and drugs can make you bleed too much during surgery. Some change  how well surgery (anesthesia) drugs work.  Call your insurance company to let them know you're having surgery. (If you don't have insurance, call [phone].)  Call your clinic if there's any change in your health. This includes a scrape or scratch near the surgery site, or any signs of a cold (sore throat, runny nose, cough, rash, fever).  Eating and drinking guidelines  For your safety: Unless your surgeon tells you otherwise, follow the guidelines below.  Eat and drink as normal until 8 hours before you arrive for surgery. After that, no food or milk. You can spit out gum when you arrive.  Drink clear liquids until 2 hours before you arrive. These are liquids you can see through, like water, Gatorade, and Propel Water. They also include plain black coffee and tea (no cream or milk).  No alcohol for 24 hours before you arrive. The night before surgery, stop any drinks that contain THC.  If your care team tells you to take medicine on the morning of surgery, it's okay to take it with a sip of water. No other medicines or drugs are allowed (including CBD oil)--follow your care team's instructions.  If you have questions the day of surgery, call your hospital or surgery center.   Preventing infection  Shower or bathe the night before and the morning of surgery. Follow the instructions your clinic gave you. (If no instructions, use regular soap.)  Don't shave or clip hair near your surgery site. We'll remove the hair if needed.  Don't smoke or vape the morning of surgery. No chewing tobacco for 6 hours before you arrive. A nicotine patch is okay. You may spit out nicotine gum when you arrive.  For some surgeries, the surgeon will tell you to fully quit smoking and nicotine.  We will make every effort to keep you safe from infection. We will:  Clean our hands often with soap and water (or an alcohol-based hand rub).  Clean the skin at your surgery site with a special soap that kills germs.  Give you a special gown to  keep you warm. (Cold raises the risk of infection.)  Wear hair covers, masks, gowns, and gloves during surgery.  Give antibiotic medicine, if prescribed. Not all surgeries need this medicine.  What to bring on the day of surgery  Photo ID and insurance card  Copy of your health care directive, if you have one  Glasses and hearing aids (bring cases)  You can't wear contacts during surgery  Inhaler and eye drops, if you use them (tell us about these when you arrive)  CPAP machine or breathing device, if you use them  A few personal items, if spending the night  If you have . . .  A pacemaker, ICD (cardiac defibrillator), or other implant: Bring the ID card.  An implanted stimulator: Bring the remote control.  A legal guardian: Bring a copy of the certified (court-stamped) guardianship papers.  Please remove any jewelry, including body piercings. Leave jewelry and other valuables at home.  If you're going home the day of surgery  You must have a support person drive you home. They should stay with you overnight, and they may need to help with your self-care.  If you don't have a support person, please tells us as soon as possible. We can help.  After surgery  If it's hard to control your pain or you need more pain medicine, please call your surgeon's office.  Questions?   If you have any questions for your care team, list them here:   ____________________________________________________________________________________________________________________________________________________________________________________________________________________________________________________________  For informational purposes only. Not to replace the advice of your health care provider. Copyright   2003, 2019 [facility]. All rights reserved. Clinically reviewed by Selwyn Galeana MD. [employer] [id number] - REV 02/25.

## 2025-07-09 NOTE — PROGRESS NOTES
Preoperative Evaluation  [address]  [place] [address]  Phone: [phone]  Fax: [phone]  Primary Provider: Raven Sharma MD  Pre-op Performing Provider: Raven Sharma MD  Jul 9, 2025 7/9/2025   Surgical Information   What procedure is being done? ovaries and fallopian tubes being taken out   Facility or Hospital where procedure/surgery will be performed: [facility]   Who is doing the procedure / surgery? dr estephanie solorio   Date of surgery / procedure: july 18   Time of surgery / procedure: tbd   Where do you plan to recover after surgery? at home with family     Fax number for surgical facility: Note does not need to be faxed, will be available electronically in Epic.    Assessment & Plan     The proposed surgical procedure is considered INTERMEDIATE risk.    Preop general physical exam    Malignant neoplasm of upper-outer quadrant of right breast in female, estrogen receptor positive (H): follows with oncology- starting radiation therapy in August.    Endometrioma of ovary: planning surgical removal, bilateral salpingo-oopherectomy          - No identified additional risk factors other than previously addressed    Preoperative Medication Instructions  Antiplatelet or Anticoagulation Medication Instructions   - We reviewed the medication list and the patient is not on an antiplatelet or anticoagulation medications.    Additional Medication Instructions  We reviewed the medication list and there are no chronic medications that need to be adjusted for this procedure.    Recommendation  Approval given to proceed with proposed procedure, without further diagnostic evaluation.        Kendra Sandhu is a 40 year old, presenting for the following:  Pre-Op Exam          7/9/2025     8:55 AM   Additional Questions   Roomed by patricio EDWARDS: history of endometrioma of ovary with pelvic pain- planning laparoscopic oopherectomy,  salpingectomy        7/9/2025   Pre-Op Questionnaire   Have you ever had a heart attack or stroke? No   Have you ever had surgery on your heart or blood vessels, such as a stent placement, a coronary artery bypass, or surgery on an artery in your head, neck, heart, or legs? No   Do you have chest pain with activity? No   Do you have a history of heart failure? No   Do you currently have a cold, bronchitis or symptoms of other infection? No   Do you have a cough, shortness of breath, or wheezing? No   Do you or anyone in your family have previous history of blood clots? No   Do you or does anyone in your family have a serious bleeding problem such as prolonged bleeding following surgeries or cuts? No   Have you ever had problems with anemia or been told to take iron pills? No   Have you had any abnormal blood loss such as black, tarry or bloody stools, or abnormal vaginal bleeding? No   Have you ever had a blood transfusion? No   Are you willing to have a blood transfusion if it is medically needed before, during, or after your surgery? Yes   Have you or any of your relatives ever had problems with anesthesia? No   Do you have sleep apnea, excessive snoring or daytime drowsiness? No   Do you have any artifical heart valves or other implanted medical devices like a pacemaker, defibrillator, or continuous glucose monitor? No   Do you have artificial joints? No   Are you allergic to latex? No     Advance Care Planning    Not on file    Preoperative Review of    reviewed - no record of controlled substances prescribed.          Patient Active Problem List    Diagnosis Date Noted    Malignant neoplasm of upper-outer quadrant of right breast in female, estrogen receptor positive (H) 05/29/2025     Priority: Medium    Malignant neoplasm of overlapping sites of right breast in female, estrogen receptor positive (H) 03/24/2025     Priority: Medium    Endometrioma of ovary 03/03/2025     Priority: Medium    Family  "history of malignant neoplasm of breast 02/08/2023     Priority: Medium    Cervical high risk HPV (human papillomavirus) test positive 11/29/2021     Priority: Medium     11/8/13 NIL  11/18/19 NIL pap, neg HPV  11/29/21 NIL pap, +HR HPV (not 16/18). Plan: cotest in 1 year  12/7/21 Pt notified   11/08/22 Reminder Mychart, Letter   12/8/22 Reminder call-lm  02/08/23 NIL pap, +HR HPV (not 16/18). Plan [facility] due bef 05/08/23 02/14/23 Left msg and Mychart result note sent.  Seen by patient Phoebe Alva on 2/14/2023  5:51 PM  03/10/23 [facility] Bx and ECC No OTM Plan cotest in 1 year due 03/10/24  02/22/24 NIL Pap, Neg HR HPV Plan cotest in 3 years         Chronic tonsillar hypertrophy 06/08/2016     Priority: Medium      Past Medical History:   Diagnosis Date    Breast cancer (H)     Cervical high risk HPV (human papillomavirus) test positive 11/29/2021 11/8/13 NIL 11/18/19 NIL pap, neg HPV 11/29/21 NIL pap, +HR HPV (not 16/18). Plan: cotest in 1 year     Past Surgical History:   Procedure Laterality Date    LUMPECTOMY BREAST Right 6/5/2025    Procedure: RIGHT Re-Excisional Segmental Mastectomy (=\"Lumpectomy\");  Surgeon: Isi Larios MD;  Location: [facility] [place] [facility] OR    LUMPECTOMY BREAST WITH SENTINEL NODE, COMBINED Right 5/13/2025    Procedure: RIGHT Segmental Mastectomy (=\"Lumpectomy\"), RIGHT Axillary [place] Lymph Node Biopsy;  Surgeon: Isi Larios MD;  Location: [facility] OR     No current outpatient medications on file.       No Known Allergies     Social History     Tobacco Use    Smoking status: Never     Passive exposure: Never    Smokeless tobacco: Never   Substance Use Topics    Alcohol use: Yes     Alcohol/week: 5.0 standard drinks of alcohol     Types: 5 Glasses of wine per week     Comment: 1 glass wine, 5 days of week       History   Drug Use Unknown               Objective    /75   Pulse 71   Temp 98.4  F (36.9  C) (Oral)   Resp 14   Ht 1.84 m (6' 0.44\")   Wt 83.8 kg (184 lb 12.8 oz)   LMP " "06/30/2025 (Approximate)   SpO2 98%   BMI 24.76 kg/m     Estimated body mass index is 24.76 kg/m  as calculated from the following:    Height as of this encounter: 1.84 m (6' 0.44\").    Weight as of this encounter: 83.8 kg (184 lb 12.8 oz).  Physical Exam  GENERAL: alert and no distress  EYES: Eyes grossly normal to inspection, PERRL and conjunctivae and sclerae normal  HENT: ear canals and TM's normal, nose and mouth without ulcers or lesions  NECK: no adenopathy, no asymmetry, masses, or scars  RESP: lungs clear to auscultation - no rales, rhonchi or wheezes  CV: regular rate and rhythm, normal S1 S2, no S3 or S4, no murmur, click or rub, no peripheral edema  MS: no gross musculoskeletal defects noted, no edema  SKIN: no suspicious lesions or rashes  NEURO: Normal strength and tone, mentation intact and speech normal  PSYCH: mentation appears normal, affect normal/bright    Recent Labs   Lab Test 01/09/25  1532   HGB 13.4         POTASSIUM 4.2   CR 0.74        Diagnostics  No labs were ordered during this visit- she has upcoming lab draw prior to surgery already scheduled  No EKG required, no history of coronary heart disease, significant arrhythmia, peripheral arterial disease or other structural heart disease.    Revised Cardiac Risk Index (RCRI)  The patient has the following serious cardiovascular risks for perioperative complications:   - No serious cardiac risks = 0 points     RCRI Interpretation: 0 points: Class I (very low risk - 0.4% complication rate)         Signed Electronically by: Raven Sharma MD  A copy of this evaluation report is provided to the requesting physician.         "

## 2025-07-11 ENCOUNTER — ANCILLARY PROCEDURE (OUTPATIENT)
Dept: BONE DENSITY | Facility: CLINIC | Age: 40
End: 2025-07-11
Attending: HOSPITALIST
Payer: COMMERCIAL

## 2025-07-11 DIAGNOSIS — Z17.0 MALIGNANT NEOPLASM OF UPPER-OUTER QUADRANT OF RIGHT BREAST IN FEMALE, ESTROGEN RECEPTOR POSITIVE (H): ICD-10-CM

## 2025-07-11 DIAGNOSIS — C50.411 MALIGNANT NEOPLASM OF UPPER-OUTER QUADRANT OF RIGHT BREAST IN FEMALE, ESTROGEN RECEPTOR POSITIVE (H): ICD-10-CM

## 2025-07-11 PROCEDURE — 77080 DXA BONE DENSITY AXIAL: CPT

## 2025-07-15 ENCOUNTER — TELEPHONE (OUTPATIENT)
Dept: RADIATION ONCOLOGY | Facility: CLINIC | Age: 40
End: 2025-07-15
Payer: COMMERCIAL

## 2025-07-15 LAB
ABO + RH BLD: NORMAL
BLD GP AB SCN SERPL QL: NEGATIVE
SPECIMEN EXP DATE BLD: NORMAL

## 2025-07-15 NOTE — TELEPHONE ENCOUNTER
Called twice at [phone] to move 7/16 radiation simulation to afternoon. No answer, left message. We can be reached at [phone].    Thank you,  Richard

## 2025-07-16 ENCOUNTER — ONCOLOGY VISIT (OUTPATIENT)
Dept: ONCOLOGY | Facility: CLINIC | Age: 40
End: 2025-07-16
Attending: HOSPITALIST
Payer: COMMERCIAL

## 2025-07-16 ENCOUNTER — APPOINTMENT (OUTPATIENT)
Dept: RADIATION ONCOLOGY | Facility: CLINIC | Age: 40
End: 2025-07-16
Attending: SURGERY
Payer: COMMERCIAL

## 2025-07-16 ENCOUNTER — LAB (OUTPATIENT)
Dept: LAB | Facility: CLINIC | Age: 40
End: 2025-07-16
Payer: COMMERCIAL

## 2025-07-16 VITALS
OXYGEN SATURATION: 96 % | BODY MASS INDEX: 24.25 KG/M2 | TEMPERATURE: 97.6 F | SYSTOLIC BLOOD PRESSURE: 118 MMHG | DIASTOLIC BLOOD PRESSURE: 79 MMHG | WEIGHT: 181 LBS | HEART RATE: 91 BPM | RESPIRATION RATE: 16 BRPM

## 2025-07-16 DIAGNOSIS — C50.411 MALIGNANT NEOPLASM OF UPPER-OUTER QUADRANT OF RIGHT BREAST IN FEMALE, ESTROGEN RECEPTOR POSITIVE (H): Primary | ICD-10-CM

## 2025-07-16 DIAGNOSIS — Z01.818 PRE-OP EXAM: ICD-10-CM

## 2025-07-16 DIAGNOSIS — Z17.0 MALIGNANT NEOPLASM OF UPPER-OUTER QUADRANT OF RIGHT BREAST IN FEMALE, ESTROGEN RECEPTOR POSITIVE (H): Primary | ICD-10-CM

## 2025-07-16 DIAGNOSIS — N80.129 ENDOMETRIOMA OF OVARY: ICD-10-CM

## 2025-07-16 LAB
ERYTHROCYTE [DISTWIDTH] IN BLOOD BY AUTOMATED COUNT: 12.5 % (ref 10–15)
HCT VFR BLD AUTO: 43.1 % (ref 35–47)
HGB BLD-MCNC: 14.2 G/DL (ref 11.7–15.7)
MCH RBC QN AUTO: 28.7 PG (ref 26.5–33)
MCHC RBC AUTO-ENTMCNC: 32.9 G/DL (ref 31.5–36.5)
MCV RBC AUTO: 87 FL (ref 78–100)
PLATELET # BLD AUTO: 256 10E3/UL (ref 150–450)
RBC # BLD AUTO: 4.95 10E6/UL (ref 3.8–5.2)
WBC # BLD AUTO: 8.4 10E3/UL (ref 4–11)

## 2025-07-16 PROCEDURE — 77280 THER RAD SIMULAJ FIELD SMPL: CPT | Performed by: RADIOLOGY

## 2025-07-16 PROCEDURE — 99213 OFFICE O/P EST LOW 20 MIN: CPT | Performed by: HOSPITALIST

## 2025-07-16 PROCEDURE — 85027 COMPLETE CBC AUTOMATED: CPT | Performed by: PATHOLOGY

## 2025-07-16 PROCEDURE — 77280 THER RAD SIMULAJ FIELD SMPL: CPT | Mod: 26 | Performed by: RADIOLOGY

## 2025-07-16 PROCEDURE — 36415 COLL VENOUS BLD VENIPUNCTURE: CPT | Performed by: PATHOLOGY

## 2025-07-16 ASSESSMENT — PAIN SCALES - GENERAL: PAINLEVEL_OUTOF10: NO PAIN (0)

## 2025-07-16 NOTE — NURSING NOTE
"Oncology Rooming Note      July 16, 2025 2:25 PM   Phoebe Alva is a 40 year old female who presents for:    Chief Complaint   Patient presents with    Breast Cancer     Initial Vitals: /79   Pulse 91   Temp 97.6  F (36.4  C) (Oral)   Resp 16   Wt 82.1 kg (181 lb)   LMP 06/30/2025 (Approximate)   SpO2 96%   BMI 24.25 kg/m   Estimated body mass index is 24.25 kg/m  as calculated from the following:    Height as of 7/9/25: 1.84 m (6' 0.44\").    Weight as of this encounter: 82.1 kg (181 lb). Body surface area is 2.05 meters squared.  No Pain (0) Comment: Data Unavailable   Patient's last menstrual period was 06/30/2025 (approximate).  Allergies reviewed: Yes  Medications reviewed: Yes    Medications: Medication refills not needed today.  Pharmacy name entered into [employer]: [id number] - [place], [place] - [address]    PHQ9:  Did this patient require a PHQ9?: No      Clinical concerns:  Patient states there are no new concerns to discuss with provider.  Dr Morales was not notified.        Nani Martinez CMA              "

## 2025-07-16 NOTE — LETTER
7/16/2025      Phoebe Alva  [address] [facility] [facility] [address]      Dear Colleague,    Thank you for referring your patient, Phoebe Alva, to the [facility]. Please see a copy of my visit note below.     [facility]   Return Patient Visit    Name: Phoebe Alva  MRN: [id number]  Date of visit: Jul 16, 2025    Diagnosis: R breast cancer-ER+/IA+/ER2 2+ (group 5 negative)  Stage:    Cancer Staging   Malignant neoplasm of overlapping sites of right breast in female, estrogen receptor positive (H)  Staging form: Breast, AJCC 8th Edition  - Clinical stage from 3/24/2025: Stage IB (cT2, cN0, cM0, G1, ER+, IA+, HER2: Equivocal) - Signed by Jessika Morales MD on 3/24/2025  - Pathologic: Stage IA (pT2, pN1a, cM0, G1, ER+, IA+, HER2-) - Unsigned      Performance status: ECOG 0    Oncology History:   -3/14/25: New diagnosis of invasive ductal carcinoma, grade 1 with DCIS-ER and IA strongly positive, HER2 2+ (group 5 negative)-[id number]      HPI:   Phoebe Alva is a 40 year old healthy female who presents with a new diagnosis of R sided breast cancer. She noted a palpable mass in her right breast during the last week of February and messaged her PCP. She underwent a diagnostic mammogram and ultrasound on 3/13/25 showed a spiculated mass in the right upper outer quadrant with ultrasound showing a 1.9x3.6x1.7cm irregular mass. No suspicious axillary LN. L breast normal. Biopsy revealed invasive ductal carcinoma, grade 1 with DCIS (grade 1 cribriform type) with ER and IA strongly positive (both %) and hER2 2+ group 5 negative. One LN was negative for carcinoma.    Of note, Phoebe has minimal past medical history and recently developed some LLQ pain for which she underwent CT A/P showing several enlarged mesenteric lymph nodes, mild splenomegaly and 2 liver lesions most consistent with benign hemangiomas. Follow up pelvic ultrasound showed a likely  "endometrioma.       Interval History:   Phoebe is doing well overall. She is planning BSO this week. She is meeting with the OBGYN tomorrow. She is also planning to start radiation next week. Dr. Larios referred her to PT for axillary cording.    She denies chest pain, SOB, abdominal pain, LE edema.     Exam:   LMP 06/30/2025 (Approximate)     Gen: Well-appearing, no acute distress  HEENT: Normocephalic atraumatic  Neck/Lymph: No lymphadenopathy  Resp: Clear to auscultation bilaterally  CV: Regular rate and rhythm  Abd: Soft nontender nondistended abdomen  Ext: Warm and well-perfused, no lower extremity edema no focal deficits  Neuro: No focal deficits       Labs:   Reviewed in chart. Key findings include   Lab Results   Component Value Date    WBC 11.4 01/09/2025     Lab Results   Component Value Date    RBC 4.69 01/09/2025     Lab Results   Component Value Date    HGB 13.4 01/09/2025     Lab Results   Component Value Date    HCT 40.4 01/09/2025     No components found for: \"MCT\"  Lab Results   Component Value Date    MCV 86 01/09/2025     Lab Results   Component Value Date    MCH 28.6 01/09/2025     Lab Results   Component Value Date    MCHC 33.2 01/09/2025     Lab Results   Component Value Date    RDW 12.1 01/09/2025     Lab Results   Component Value Date     01/09/2025        Imaging:   All pertinent imaging studies personally reviewed, martin findings included in oncology history above    FINDINGS  MAMMOGRAM:  BREAST DENSITY: The breasts are heterogeneously dense, which may  obscure small masses.     Technique: Standard mammographic views were performed with  tomosynthesis and synthetic mammogram   Minimal background parenchymal enhancement. No suspicious enhancement  in the left breast.  The suspicious right breast cancer has rim enhancement with a probable  necrotic center measuring 3.6 x 2.2 x 2.1 cm.                                                                      IMPRESSION: BI-RADS CATEGORY: 5 - " Highly Suggestive of Malignancy.     RECOMMENDED FOLLOW-UP: Biopsy.  Right breast ultrasound-guided biopsy.  Right axillary biopsy.     The finding and recommendation were discussed with the patient at the  time of evaluation.     The results of the examination will be sent to the patient.       Pathology:   Reviewed in chart, key findings included in history above   A. Lymph node, RIGHT axillary, sentinel #1, excision:  -One benign lymph node (0/1)  -Prior core biopsy site changes     B. Lymph node, RIGHT axillary, sentinel #2, excision:  -METASTATIC BREAST DUCTAL CARCINOMA in one of one lymph node (1/1)  -Metastasis is 5.5 mm in size  -No extranodal extension is identified  -Tumor emboli in perinodal lymphatics   -Metastatic carcinoma is estrogen receptor positive, progesterone receptor positive, and HER2 negative (score 1+) by immunohistochemistry (performed on this specimen, see 'Breast Biomarker Reporting Template' below)  -See comment     C. Lymph node, RIGHT axillary, sentinel #3, excision:  -One benign lymph node (0/1)     D. Lymph node, RIGHT axillary, sentinel #4, excision:  -One benign lymph node (0/1)     E. RIGHT breast, upper outer quadrant, segmental mastectomy:  -INVASIVE BREAST CARCINOMA OF NO SPECIAL TYPE (INVASIVE DUCTAL CARCINOMA), TRICIA GRADE 1, size 34 mm  -Ductal carcinoma in situ (DCIS), nuclear grade 2, cribriform, solid, and micropapillary type(s), with focal necrosis  -DCIS is admixed with and extends beyond invasive carcinoma, to span an estimated 60 mm  -Lymphovascular invasion present, extensive  -Lateral margin is focally involved by invasive carcinoma  -Invasive carcinoma is 2 mm from the superior margin and > 5 mm from the anterior, posterior, inferior and medial margins   -Margins are uninvolved by DCIS  -DCIS is 1.4 mm from the nearest (medial) margin, 1.5 mm from the lateral margin, 3 mm from the posterior margin, 5 mm from the superior margin and >5 mm from the anterior and  inferior margins   -Other findings: fibrocystic change (including microcysts), columnar cell change, fibroadenoma (4 mm) and collagenous spherulosis  -Calcifications associated with DCIS  -Prior core biopsy site changes   -Invasive carcinoma is estrogen receptor positive (%, strong intensity), progesterone receptor positive (%, strong intensity), and HER2 equivocal (score 2+) by immunohistochemistry and is HER2 non-amplified (group 5) by FISH (performed on prior core biopsy, see report [id number] specimen A)  -See microscopic description  -See tumor synoptic below       6/5/25 Repeat lumpectomy:  Final Diagnosis   RIGHT breast, lateral margin, re-excision segmental mastectomy:  -No residual carcinoma identified  -Benign breast tissue with extensive post-operative changes  -Negative for atypia or malignancy      Assessment and Plan:   Phoebe Alva is a 40 year old female with a new diagnosis of hormone receptor positive right breast cancer.      # T2N0 Right Breast Cancer (Stage IB)  -Mammaprint low risk +0.264  -genetic testing negative  -ER/PA strongly positive, HER2 negative (2+, group 5 negative), grade 1  -underwent initial surgery on 5/13/25 with path showing 3.4cm IDC, grade 1 with positive margins. Also extensive DCIS measuring 6cm. LVI is present. 1/4 positive LN of 5.5mm in size. Tumor emboli noted and no CARLIE. Repeat lumpectomy for re-excision on 6/5/25 without residual carcinoma.  -saw Rad Onc and discussed axillary and breast radiation. Plan to start next week.   -we discussed systemic treatment options including tamoxifen for 5 years vs OFS + Tamoxifen vs OFS+AI (as per SOFT/TEXT) or Tamoxifen for 10 years. Tamoxifen for 10 years and OFS.endocrine therapy would be more aggressive strategies than Tamoxifen x 5 years alone. We discussed that she does not have a high risk mammaprint but is node +, +LVI and young so OFS could potentially offer her benefit/ is reasonable but comes with more side  effects than tamoxifen alone.She is already undergoing an oophorectomy on the right for a painful endometrioma and prefers to have bilateral oophorectomy given persistent likely endometriosis and pain.  I alerted Dr. Vega of this plan. We will plan for ovarian suppression with BSO and then add in aromatase inhibitor after 2-3 months. She prefers a more aggressive risk reduction plan.  -DEXA scan with normal bone density    Fertility:  -no plan for children  -partner has vasectomy for contraception  -plan for bilateral BSO for endometrioma/endometriosis    LLQ pain:  -resolved, likely endometrioma seen on ultrasound  -CT A/P with enlarged mesenteric Lns and US with endometrioma (<1% chance of malignancy)    Endometrioma:  -cystic lesion with <1% chance of malignancy  -genetic testing pending  -repeat us in 12 months    Hepatic lesions:  -most consistent with hemangiomas    Plan:  -BSO  -Radiation oncology  -return in 6 weeks for addition of AI for endocrine therapy    Jessika Morales MD   of Medicine  Division of Hematology, Oncology and Transplantation    ---  30 minutes were spent on the date of the encounter performing chart review, history and exam, documentation, and further activities as noted above.      The longitudinal plan of care for the diagnosis(es)/condition(s) as documented were addressed during this visit. Due to the added complexity in care, I will continue to support Phoebe in the subsequent management and with ongoing continuity of care.         Again, thank you for allowing me to participate in the care of your patient.        Sincerely,        Jessika Morales MD    Electronically signed

## 2025-07-16 NOTE — PROGRESS NOTES
[facility]   Return Patient Visit    Name: Phoebe Alva  MRN: [id number]  Date of visit: Jul 16, 2025    Diagnosis: R breast cancer-ER+/IN+/ER2 2+ (group 5 negative)  Stage:    Cancer Staging   Malignant neoplasm of overlapping sites of right breast in female, estrogen receptor positive (H)  Staging form: Breast, AJCC 8th Edition  - Clinical stage from 3/24/2025: Stage IB (cT2, cN0, cM0, G1, ER+, IN+, HER2: Equivocal) - Signed by Jessika Morales MD on 3/24/2025  - Pathologic: Stage IA (pT2, pN1a, cM0, G1, ER+, IN+, HER2-) - Unsigned      Performance status: ECOG 0    Oncology History:   -3/14/25: New diagnosis of invasive ductal carcinoma, grade 1 with DCIS-ER and IN strongly positive, HER2 2+ (group 5 negative)-[id number]      HPI:   Phoebe Alva is a 40 year old healthy female who presents with a new diagnosis of R sided breast cancer. She noted a palpable mass in her right breast during the last week of February and messaged her PCP. She underwent a diagnostic mammogram and ultrasound on 3/13/25 showed a spiculated mass in the right upper outer quadrant with ultrasound showing a 1.9x3.6x1.7cm irregular mass. No suspicious axillary LN. L breast normal. Biopsy revealed invasive ductal carcinoma, grade 1 with DCIS (grade 1 cribriform type) with ER and IN strongly positive (both %) and hER2 2+ group 5 negative. One LN was negative for carcinoma.    Of note, Phoebe has minimal past medical history and recently developed some LLQ pain for which she underwent CT A/P showing several enlarged mesenteric lymph nodes, mild splenomegaly and 2 liver lesions most consistent with benign hemangiomas. Follow up pelvic ultrasound showed a likely endometrioma.       Interval History:   Phoebe is doing well overall. She is planning BSO this week. She is meeting with the OBGYN tomorrow. She is also planning to start radiation next week. Dr. Larios referred her to PT for axillary  "cording.    She denies chest pain, SOB, abdominal pain, LE edema.     Exam:   LMP 06/30/2025 (Approximate)     Gen: Well-appearing, no acute distress  HEENT: Normocephalic atraumatic  Neck/Lymph: No lymphadenopathy  Resp: Clear to auscultation bilaterally  CV: Regular rate and rhythm  Abd: Soft nontender nondistended abdomen  Ext: Warm and well-perfused, no lower extremity edema no focal deficits  Neuro: No focal deficits       Labs:   Reviewed in chart. Key findings include   Lab Results   Component Value Date    WBC 11.4 01/09/2025     Lab Results   Component Value Date    RBC 4.69 01/09/2025     Lab Results   Component Value Date    HGB 13.4 01/09/2025     Lab Results   Component Value Date    HCT 40.4 01/09/2025     No components found for: \"MCT\"  Lab Results   Component Value Date    MCV 86 01/09/2025     Lab Results   Component Value Date    MCH 28.6 01/09/2025     Lab Results   Component Value Date    MCHC 33.2 01/09/2025     Lab Results   Component Value Date    RDW 12.1 01/09/2025     Lab Results   Component Value Date     01/09/2025        Imaging:   All pertinent imaging studies personally reviewed, martin findings included in oncology history above    FINDINGS  MAMMOGRAM:  BREAST DENSITY: The breasts are heterogeneously dense, which may  obscure small masses.     Technique: Standard mammographic views were performed with  tomosynthesis and synthetic mammogram   Minimal background parenchymal enhancement. No suspicious enhancement  in the left breast.  The suspicious right breast cancer has rim enhancement with a probable  necrotic center measuring 3.6 x 2.2 x 2.1 cm.                                                                      IMPRESSION: BI-RADS CATEGORY: 5 - Highly Suggestive of Malignancy.     RECOMMENDED FOLLOW-UP: Biopsy.  Right breast ultrasound-guided biopsy.  Right axillary biopsy.     The finding and recommendation were discussed with the patient at the  time of evaluation.     The " results of the examination will be sent to the patient.       Pathology:   Reviewed in chart, key findings included in history above   A. Lymph node, RIGHT axillary, sentinel #1, excision:  -One benign lymph node (0/1)  -Prior core biopsy site changes     B. Lymph node, RIGHT axillary, sentinel #2, excision:  -METASTATIC BREAST DUCTAL CARCINOMA in one of one lymph node (1/1)  -Metastasis is 5.5 mm in size  -No extranodal extension is identified  -Tumor emboli in perinodal lymphatics   -Metastatic carcinoma is estrogen receptor positive, progesterone receptor positive, and HER2 negative (score 1+) by immunohistochemistry (performed on this specimen, see 'Breast Biomarker Reporting Template' below)  -See comment     C. Lymph node, RIGHT axillary, sentinel #3, excision:  -One benign lymph node (0/1)     D. Lymph node, RIGHT axillary, sentinel #4, excision:  -One benign lymph node (0/1)     E. RIGHT breast, upper outer quadrant, segmental mastectomy:  -INVASIVE BREAST CARCINOMA OF NO SPECIAL TYPE (INVASIVE DUCTAL CARCINOMA), TRICIA GRADE 1, size 34 mm  -Ductal carcinoma in situ (DCIS), nuclear grade 2, cribriform, solid, and micropapillary type(s), with focal necrosis  -DCIS is admixed with and extends beyond invasive carcinoma, to span an estimated 60 mm  -Lymphovascular invasion present, extensive  -Lateral margin is focally involved by invasive carcinoma  -Invasive carcinoma is 2 mm from the superior margin and > 5 mm from the anterior, posterior, inferior and medial margins   -Margins are uninvolved by DCIS  -DCIS is 1.4 mm from the nearest (medial) margin, 1.5 mm from the lateral margin, 3 mm from the posterior margin, 5 mm from the superior margin and >5 mm from the anterior and inferior margins   -Other findings: fibrocystic change (including microcysts), columnar cell change, fibroadenoma (4 mm) and collagenous spherulosis  -Calcifications associated with DCIS  -Prior core biopsy site changes   -Invasive  carcinoma is estrogen receptor positive (%, strong intensity), progesterone receptor positive (%, strong intensity), and HER2 equivocal (score 2+) by immunohistochemistry and is HER2 non-amplified (group 5) by FISH (performed on prior core biopsy, see report [id number] specimen A)  -See microscopic description  -See tumor synoptic below       6/5/25 Repeat lumpectomy:  Final Diagnosis   RIGHT breast, lateral margin, re-excision segmental mastectomy:  -No residual carcinoma identified  -Benign breast tissue with extensive post-operative changes  -Negative for atypia or malignancy      Assessment and Plan:   Phoebe Alva is a 40 year old female with a new diagnosis of hormone receptor positive right breast cancer.      # T2N0 Right Breast Cancer (Stage IB)  -Mammaprint low risk +0.264  -genetic testing negative  -ER/IL strongly positive, HER2 negative (2+, group 5 negative), grade 1  -underwent initial surgery on 5/13/25 with path showing 3.4cm IDC, grade 1 with positive margins. Also extensive DCIS measuring 6cm. LVI is present. 1/4 positive LN of 5.5mm in size. Tumor emboli noted and no CARLIE. Repeat lumpectomy for re-excision on 6/5/25 without residual carcinoma.  -saw Rad Onc and discussed axillary and breast radiation. Plan to start next week.   -we discussed systemic treatment options including tamoxifen for 5 years vs OFS + Tamoxifen vs OFS+AI (as per SOFT/TEXT) or Tamoxifen for 10 years. Tamoxifen for 10 years and OFS.endocrine therapy would be more aggressive strategies than Tamoxifen x 5 years alone. We discussed that she does not have a high risk mammaprint but is node +, +LVI and young so OFS could potentially offer her benefit/ is reasonable but comes with more side effects than tamoxifen alone.She is already undergoing an oophorectomy on the right for a painful endometrioma and prefers to have bilateral oophorectomy given persistent likely endometriosis and pain.  I alerted Dr. Vega of  this plan. We will plan for ovarian suppression with BSO and then add in aromatase inhibitor after 2-3 months. She prefers a more aggressive risk reduction plan.  -DEXA scan with normal bone density    Fertility:  -no plan for children  -partner has vasectomy for contraception  -plan for bilateral BSO for endometrioma/endometriosis    LLQ pain:  -resolved, likely endometrioma seen on ultrasound  -CT A/P with enlarged mesenteric Lns and US with endometrioma (<1% chance of malignancy)    Endometrioma:  -cystic lesion with <1% chance of malignancy  -genetic testing pending  -repeat us in 12 months    Hepatic lesions:  -most consistent with hemangiomas    Plan:  -BSO  -Radiation oncology  -return in 6 weeks for addition of AI for endocrine therapy    Jessika Morales MD   of Medicine  Division of Hematology, Oncology and Transplantation    ---  30 minutes were spent on the date of the encounter performing chart review, history and exam, documentation, and further activities as noted above.      The longitudinal plan of care for the diagnosis(es)/condition(s) as documented were addressed during this visit. Due to the added complexity in care, I will continue to support Pohebe in the subsequent management and with ongoing continuity of care.

## 2025-07-18 ENCOUNTER — HOSPITAL ENCOUNTER (OUTPATIENT)
Facility: CLINIC | Age: 40
Discharge: HOME | End: 2025-07-18
Attending: OBSTETRICS & GYNECOLOGY | Admitting: OBSTETRICS & GYNECOLOGY
Payer: COMMERCIAL

## 2025-07-18 VITALS
WEIGHT: 179.68 LBS | TEMPERATURE: 97.9 F | HEIGHT: 72 IN | SYSTOLIC BLOOD PRESSURE: 116 MMHG | BODY MASS INDEX: 24.34 KG/M2 | DIASTOLIC BLOOD PRESSURE: 82 MMHG | HEART RATE: 74 BPM | RESPIRATION RATE: 12 BRPM | OXYGEN SATURATION: 96 %

## 2025-07-18 DIAGNOSIS — G89.18 POSTOPERATIVE PAIN: Primary | ICD-10-CM

## 2025-07-18 LAB — HCG UR QL: NEGATIVE

## 2025-07-18 PROCEDURE — 58661 LAPAROSCOPY REMOVE ADNEXA: CPT | Mod: 80 | Performed by: OBSTETRICS & GYNECOLOGY

## 2025-07-18 PROCEDURE — 360N000077 HC SURGERY LEVEL 4, PER MIN: Performed by: OBSTETRICS & GYNECOLOGY

## 2025-07-18 PROCEDURE — 88302 TISSUE EXAM BY PATHOLOGIST: CPT | Mod: 26 | Performed by: PATHOLOGY

## 2025-07-18 PROCEDURE — 88302 TISSUE EXAM BY PATHOLOGIST: CPT | Mod: TC | Performed by: OBSTETRICS & GYNECOLOGY

## 2025-07-18 PROCEDURE — 250N000013 HC RX MED GY IP 250 OP 250 PS 637

## 2025-07-18 PROCEDURE — 250N000011 HC RX IP 250 OP 636: Performed by: ANESTHESIOLOGY

## 2025-07-18 PROCEDURE — 370N000017 HC ANESTHESIA TECHNICAL FEE, PER MIN: Performed by: OBSTETRICS & GYNECOLOGY

## 2025-07-18 PROCEDURE — 81025 URINE PREGNANCY TEST: CPT | Performed by: OBSTETRICS & GYNECOLOGY

## 2025-07-18 PROCEDURE — 272N000001 HC OR GENERAL SUPPLY STERILE: Performed by: OBSTETRICS & GYNECOLOGY

## 2025-07-18 PROCEDURE — 710N000010 HC RECOVERY PHASE 1, LEVEL 2, PER MIN: Performed by: OBSTETRICS & GYNECOLOGY

## 2025-07-18 PROCEDURE — 250N000013 HC RX MED GY IP 250 OP 250 PS 637: Performed by: OBSTETRICS & GYNECOLOGY

## 2025-07-18 PROCEDURE — 710N000012 HC RECOVERY PHASE 2, PER MINUTE: Performed by: OBSTETRICS & GYNECOLOGY

## 2025-07-18 PROCEDURE — 250N000025 HC SEVOFLURANE, PER MIN: Performed by: OBSTETRICS & GYNECOLOGY

## 2025-07-18 PROCEDURE — 250N000011 HC RX IP 250 OP 636: Performed by: OBSTETRICS & GYNECOLOGY

## 2025-07-18 PROCEDURE — 58661 LAPAROSCOPY REMOVE ADNEXA: CPT | Mod: 50 | Performed by: OBSTETRICS & GYNECOLOGY

## 2025-07-18 PROCEDURE — 999N000141 HC STATISTIC PRE-PROCEDURE NURSING ASSESSMENT: Performed by: OBSTETRICS & GYNECOLOGY

## 2025-07-18 RX ORDER — OXYCODONE HYDROCHLORIDE 5 MG/1
5 TABLET ORAL EVERY 6 HOURS PRN
Qty: 4 TABLET | Refills: 0 | Status: CANCELLED | OUTPATIENT
Start: 2025-07-18 | End: 2025-07-21

## 2025-07-18 RX ORDER — IBUPROFEN 200 MG
200 TABLET ORAL EVERY 6 HOURS PRN
COMMUNITY

## 2025-07-18 RX ORDER — NALOXONE HYDROCHLORIDE 0.4 MG/ML
0.1 INJECTION, SOLUTION INTRAMUSCULAR; INTRAVENOUS; SUBCUTANEOUS
Status: DISCONTINUED | OUTPATIENT
Start: 2025-07-18 | End: 2025-07-18 | Stop reason: HOSPADM

## 2025-07-18 RX ORDER — CEFAZOLIN SODIUM/WATER 2 G/20 ML
2 SYRINGE (ML) INTRAVENOUS
Status: COMPLETED | OUTPATIENT
Start: 2025-07-18 | End: 2025-07-18

## 2025-07-18 RX ORDER — PHENAZOPYRIDINE HYDROCHLORIDE 200 MG/1
200 TABLET, FILM COATED ORAL ONCE
Status: COMPLETED | OUTPATIENT
Start: 2025-07-18 | End: 2025-07-18

## 2025-07-18 RX ORDER — OXYCODONE HYDROCHLORIDE 5 MG/1
5 TABLET ORAL
Status: DISCONTINUED | OUTPATIENT
Start: 2025-07-18 | End: 2025-07-18 | Stop reason: HOSPADM

## 2025-07-18 RX ORDER — ONDANSETRON 2 MG/ML
4 INJECTION INTRAMUSCULAR; INTRAVENOUS EVERY 30 MIN PRN
Status: DISCONTINUED | OUTPATIENT
Start: 2025-07-18 | End: 2025-07-18 | Stop reason: HOSPADM

## 2025-07-18 RX ORDER — ONDANSETRON 4 MG/1
4 TABLET, ORALLY DISINTEGRATING ORAL EVERY 30 MIN PRN
Status: DISCONTINUED | OUTPATIENT
Start: 2025-07-18 | End: 2025-07-18 | Stop reason: HOSPADM

## 2025-07-18 RX ORDER — FENTANYL CITRATE 50 UG/ML
25 INJECTION, SOLUTION INTRAMUSCULAR; INTRAVENOUS EVERY 5 MIN PRN
Status: DISCONTINUED | OUTPATIENT
Start: 2025-07-18 | End: 2025-07-18 | Stop reason: HOSPADM

## 2025-07-18 RX ORDER — IBUPROFEN 800 MG/1
800 TABLET, FILM COATED ORAL EVERY 6 HOURS PRN
Qty: 30 TABLET | Refills: 0 | Status: SHIPPED | OUTPATIENT
Start: 2025-07-18

## 2025-07-18 RX ORDER — ACETAMINOPHEN 325 MG/1
650 TABLET ORAL EVERY 6 HOURS PRN
COMMUNITY

## 2025-07-18 RX ORDER — ACETAMINOPHEN 325 MG/1
975 TABLET ORAL ONCE
Status: COMPLETED | OUTPATIENT
Start: 2025-07-18 | End: 2025-07-18

## 2025-07-18 RX ORDER — OXYCODONE HYDROCHLORIDE 5 MG/1
5 TABLET ORAL EVERY 6 HOURS PRN
Qty: 10 TABLET | Refills: 0 | Status: SHIPPED | OUTPATIENT
Start: 2025-07-18 | End: 2025-07-21

## 2025-07-18 RX ORDER — AMOXICILLIN 250 MG
1-2 CAPSULE ORAL 2 TIMES DAILY PRN
Qty: 30 TABLET | Refills: 0 | Status: SHIPPED | OUTPATIENT
Start: 2025-07-18

## 2025-07-18 RX ORDER — FENTANYL CITRATE 50 UG/ML
50 INJECTION, SOLUTION INTRAMUSCULAR; INTRAVENOUS EVERY 5 MIN PRN
Status: DISCONTINUED | OUTPATIENT
Start: 2025-07-18 | End: 2025-07-18 | Stop reason: HOSPADM

## 2025-07-18 RX ORDER — BUPIVACAINE HYDROCHLORIDE 2.5 MG/ML
INJECTION, SOLUTION INFILTRATION; PERINEURAL PRN
Status: DISCONTINUED | OUTPATIENT
Start: 2025-07-18 | End: 2025-07-18 | Stop reason: HOSPADM

## 2025-07-18 RX ORDER — DEXAMETHASONE SODIUM PHOSPHATE 4 MG/ML
4 INJECTION, SOLUTION INTRA-ARTICULAR; INTRALESIONAL; INTRAMUSCULAR; INTRAVENOUS; SOFT TISSUE
Status: DISCONTINUED | OUTPATIENT
Start: 2025-07-18 | End: 2025-07-18 | Stop reason: HOSPADM

## 2025-07-18 RX ORDER — HYDROMORPHONE HYDROCHLORIDE 1 MG/ML
0.2 INJECTION, SOLUTION INTRAMUSCULAR; INTRAVENOUS; SUBCUTANEOUS EVERY 5 MIN PRN
Status: DISCONTINUED | OUTPATIENT
Start: 2025-07-18 | End: 2025-07-18 | Stop reason: HOSPADM

## 2025-07-18 RX ORDER — SODIUM CHLORIDE, SODIUM LACTATE, POTASSIUM CHLORIDE, CALCIUM CHLORIDE 600; 310; 30; 20 MG/100ML; MG/100ML; MG/100ML; MG/100ML
INJECTION, SOLUTION INTRAVENOUS CONTINUOUS
Status: DISCONTINUED | OUTPATIENT
Start: 2025-07-18 | End: 2025-07-18 | Stop reason: HOSPADM

## 2025-07-18 RX ORDER — ACETAMINOPHEN 325 MG/1
975 TABLET ORAL ONCE
Status: DISCONTINUED | OUTPATIENT
Start: 2025-07-18 | End: 2025-07-18 | Stop reason: HOSPADM

## 2025-07-18 RX ORDER — CEFAZOLIN SODIUM/WATER 2 G/20 ML
2 SYRINGE (ML) INTRAVENOUS SEE ADMIN INSTRUCTIONS
Status: DISCONTINUED | OUTPATIENT
Start: 2025-07-18 | End: 2025-07-18 | Stop reason: HOSPADM

## 2025-07-18 RX ORDER — HYDROMORPHONE HYDROCHLORIDE 1 MG/ML
0.4 INJECTION, SOLUTION INTRAMUSCULAR; INTRAVENOUS; SUBCUTANEOUS EVERY 5 MIN PRN
Status: DISCONTINUED | OUTPATIENT
Start: 2025-07-18 | End: 2025-07-18 | Stop reason: HOSPADM

## 2025-07-18 RX ORDER — OXYCODONE HYDROCHLORIDE 10 MG/1
10 TABLET ORAL
Status: DISCONTINUED | OUTPATIENT
Start: 2025-07-18 | End: 2025-07-18 | Stop reason: HOSPADM

## 2025-07-18 RX ORDER — FENTANYL CITRATE 50 UG/ML
25 INJECTION, SOLUTION INTRAMUSCULAR; INTRAVENOUS
Status: DISCONTINUED | OUTPATIENT
Start: 2025-07-18 | End: 2025-07-18 | Stop reason: HOSPADM

## 2025-07-18 RX ORDER — ACETAMINOPHEN 325 MG/1
975 TABLET ORAL EVERY 6 HOURS PRN
Qty: 50 TABLET | Refills: 0 | Status: SHIPPED | OUTPATIENT
Start: 2025-07-18

## 2025-07-18 RX ORDER — OXYCODONE HYDROCHLORIDE 5 MG/1
5 TABLET ORAL
Status: COMPLETED | OUTPATIENT
Start: 2025-07-18 | End: 2025-07-18

## 2025-07-18 RX ADMIN — ACETAMINOPHEN 975 MG: 325 TABLET ORAL at 06:55

## 2025-07-18 RX ADMIN — PHENAZOPYRIDINE 200 MG: 200 TABLET ORAL at 06:55

## 2025-07-18 RX ADMIN — FENTANYL CITRATE 50 MCG: 0.05 INJECTION, SOLUTION INTRAMUSCULAR; INTRAVENOUS at 10:00

## 2025-07-18 RX ADMIN — FENTANYL CITRATE 50 MCG: 0.05 INJECTION, SOLUTION INTRAMUSCULAR; INTRAVENOUS at 10:09

## 2025-07-18 RX ADMIN — OXYCODONE HYDROCHLORIDE 5 MG: 5 TABLET ORAL at 10:43

## 2025-07-18 ASSESSMENT — ACTIVITIES OF DAILY LIVING (ADL)
ADLS_ACUITY_SCORE: 42
ADLS_ACUITY_SCORE: 41
ADLS_ACUITY_SCORE: 42
ADLS_ACUITY_SCORE: 41

## 2025-07-18 NOTE — BRIEF OP NOTE
[facility]    Brief Operative Note    Pre-operative diagnosis: Endometrioma of ovary [N80.129]  Complex ovarian cyst [N83.299]  Encounter for consultation for female sterilization [Z30.09]  Post-operative diagnosis Same as pre-operative diagnosis    Procedure: Laparoscopic Bilateral Salpingo-oophorectomy, Cystoscopy, Bilateral - Abdomen    Surgeon: Surgeons and Role:     * Gabrielle Vega MD - Primary     * Gabrielle Magallon MD - Assisting     * Missael Huerta MD - Resident - Assisting     * Kelly Whyte- Resident- Assisting  Anesthesia: General   Estimated Blood Loss: 20   IVF: 750cc  Urine: 200cc    Drains: None  Specimens:   ID Type Source Tests Collected by Time Destination   1 :  Tissue Ovary and Fallopian Tube, Right SURGICAL PATHOLOGY EXAM Gabrielle Vega MD 7/18/2025  8:26 AM    2 :  Tissue Ovary and Fallopian Tube, Left SURGICAL PATHOLOGY EXAM Gabrielle Vega MD 7/18/2025  8:26 AM      Findings:   Normal external genitalia, vagina and cervix. Entry into abdomen without injury noted, upper abdominal examination normal. Numerous peritoneal endometriosis implants bilateral uteroscaral ligaments, pelvic sidewall. Notable right sided endometrioma densely adhered to right pelvic sidewall. Hemostatic agent applied to right ovarian fossa. Cystoscopy with bilateral UO jets and normal bladder survey. Hemostatic pedicles at end of case..  Complications: None.  Implants: * No implants in log *    Missael Huerta DO, MA  [facility]- [id number]  9:22 AM July 18, 2025

## 2025-07-18 NOTE — OP NOTE
[facility]     Operative Note    Patient: Phoebe Alva   MRN: [id number]  : 1985    Date of procedure: 2025      Pre-operative diagnosis:           Endometrioma of ovary [N80.129]  Complex ovarian cyst [N83.299]  Pelvic pain   Encounter for consultation for female sterilization [Z30.09]  Malignant neoplasm of overlapping sites of right breast in female, estrogen receptor positive     Post-operative diagnosis        Same as pre-operative diagnosis     Procedure:      Laparoscopic Bilateral Salpingo-oophorectomy, Cystoscopy     Surgeon:         Surgeons and Role:     * Gabrielle Vega MD - Primary     * Gabrielle Magallon MD - Assisting     * Missael Huerta MD - Resident - Assisting     * Kelly Whyte- Resident- Assisting  Anesthesia:     General             Estimated Blood Loss: 20   IVF: 750cc  Urine: 200cc     Drains: None  Specimens:       ID Type Source Tests Collected by Time Destination   1 :  Tissue Ovary and Fallopian Tube, Right SURGICAL PATHOLOGY EXAM Gabrielle Vega MD 2025  8:26 AM     2 :  Tissue Ovary and Fallopian Tube, Left SURGICAL PATHOLOGY EXAM Gabrielle Vega MD 2025  8:26 AM        Findings:  Normal external genitalia, vagina and cervix. Entry into abdomen without injury noted, upper abdominal examination normal. Numerous peritoneal endometriosis implants bilateral uteroscaral ligaments, pelvic sidewall. Notable right sided 5cm endometrioma densely adhered to right pelvic sidewall, partially ruptured with approximately 10mL of old blood in posterior cul de sac. Hemostatic agent applied to right ovarian fossa. Cystoscopy with bilateral UO jets and normal bladder survey. Hemostatic pedicles at end of case..  Complications:            None.  Implants:         * No implants in log *        INDICATIONS: Phoebe Alva is a 40 year old  with worsening pelvic pain, complex right  ovarian cyst, suspected endometriosis, desire for permanent sterilization and Malignant neoplasm of overlapping sites of right breast in female, estrogen receptor positive. With advisement from her oncologist, decision was made to remove both ovaries at the time of the procedure due to estrogen receptor status of her breast cancer. Patient agreed with plan for laparoscopic bilateral salpingo-oophorectomy. The patient was counseled on risks, benefits and alternatives to the above listed procedure. Informed consent was signed prior to the procedure.    PROCEDURE:    The patient was taken to the operating room where she was placed in the dorsal lithotomy position with feet in yellow fin stirrups. General endotracheal anesthesia was administered. An exam under anesthesia was performed. The patient was then prepped and draped in the usual sterile fashion. She received Ancef 2g IV for antibiotic prophylaxis.     A katz catheter was placed in sterile fashion. A speculum was inserted into the vagina, a single toothed tenaculum placed on the cervix at 12 o'clock, and a uterine manipulator inserted into the cervical os. The speculum was removed. Attention was then turned to the abdomen where 0.25% bupivicaine was used to infiltrate the inferior aspect of the umbilicus. An 11-blade scalpel was used to make a 5 mm vertical incision in the umbilicus and a Dawes used to expand the incision and bluntly dissect through the subcutaneous tissue to the fascia.  A 5 mm port was placed under direct visualization. CO2 gas was attached to the port. Pneumoperitoneum was achieved.     Attention was turned to the LLQ of the abdomen where, 0.25% bupivicaine injected, and a 10 mm horizontal skin incision made. The incision was stretched with a Dawes. A 10 mm port was placed under visualization within the abdomen. A 5 mm port was placed in the RLQ of the abdomen with similar technique under visualization.     Inspection of the pelvis showed  a normal left ovary and pelvic side wall.The left ureter was identified and remained outside the operative zone. The left ovary was grasped with atraumatic grasper and lifted into view. The Ligasure was used to cauterize the left infundubilopelvic ligament twice. The Ligasure was then used to cauterized and cut through the mesosalpinx until the left ovary and fallopian tube were excised.     An endo-catch bag was placed in the 12 mm port and the ovary brought to the abdominal wall. The specimen was handed off for examination in pathology.     Inspection of the pelvis showed an abnormal right ovary, right posterior cul de sac and pelvic side wall.  A partially ruptured right endometrioma was adherent to right pelvic side wall, posterior aspect of the uterus and the under lying colon. The right ureter was not identified due to dense adhesive disease. Significant time was spent blunt dissecting the right endometrioma from the right ovarian fossa. The right ovary was grasped with atraumatic grasper and lifted into view. The Ligasure was used to cauterize the right infundubilopelvic ligament twice. The Ligasure was then used to cauterized and cut through the mesosalpinx until the right ovary and fallopian tube were excised.     An endo-catch bag was placed in the 12 mm port and the ovary brought to the abdominal wall. The specimen was handed off for examination in pathology.     Cystoscopy performed confirming intact bladder and strong efflux from bilateral ureteral orifices.     Oozing of the right ovarian fossa was noted. Hemostatic agent was applied and pressure was held. Using the ligasure device a small area of active extravasation was cauterized. A final visual sweep of the pelvis showed no further bleeding.     Due to the additional cautery that was performed along right pelvic sidewall, cystoscopy was repeated which confirmed strong efflux from right ureteral orifice. All instruments were removed from the bladder  and vagina.    Attention returned to the abdomen were hemostasis was confirmed.     The peritoneum and fascia of the 10 mm port site closed using the Lance-Johnson insert and needle with 0-vicryl. The pneumoperitoneum was expelled and the ports were removed. The skin incisions were closed using 4-0 vicryl and dermabond.     Instrument, sponge, and needle counts were correct times 2. The patient was extubated in the operating room and transferred to the PACU in stable condition.    Dr. Vega was present for the entire procedure.     Missael Huerta DO, MA  [facility]- PGY4  9:36 AM July 18, 2025       ATTESTATION:   I was scrubbed and present for the entire procedure. I agree with the above note which I have edited to reflect my findings.  Dr. Gabrielle Magallon was scrubbed and present for key portions of this case due to the extensive endometriosis and adhesive disease. She assisted with dissecting out the right ovary, removing the right ovary, ensuring hemostasis and closing the fascia on the left port site.     Gabrielle Vega MD

## 2025-07-21 ENCOUNTER — APPOINTMENT (OUTPATIENT)
Dept: RADIATION ONCOLOGY | Facility: CLINIC | Age: 40
End: 2025-07-21
Attending: SURGERY
Payer: COMMERCIAL

## 2025-07-21 PROCEDURE — 77387 GUIDANCE FOR RADJ TX DLVR: CPT | Performed by: RADIOLOGY

## 2025-07-21 PROCEDURE — G6002 STEREOSCOPIC X-RAY GUIDANCE: HCPCS | Mod: 26 | Performed by: RADIOLOGY

## 2025-07-21 PROCEDURE — 77412 RADIATION TX DELIVERY LVL 3: CPT | Performed by: RADIOLOGY

## 2025-07-22 ENCOUNTER — APPOINTMENT (OUTPATIENT)
Dept: RADIATION ONCOLOGY | Facility: CLINIC | Age: 40
End: 2025-07-22
Attending: RADIOLOGY
Payer: COMMERCIAL

## 2025-07-22 PROCEDURE — 77387 GUIDANCE FOR RADJ TX DLVR: CPT | Performed by: RADIOLOGY

## 2025-07-22 PROCEDURE — G6002 STEREOSCOPIC X-RAY GUIDANCE: HCPCS | Mod: 26 | Performed by: RADIOLOGY

## 2025-07-22 PROCEDURE — 77412 RADIATION TX DELIVERY LVL 3: CPT | Performed by: RADIOLOGY

## 2025-07-23 ENCOUNTER — OFFICE VISIT (OUTPATIENT)
Dept: RADIATION ONCOLOGY | Facility: CLINIC | Age: 40
End: 2025-07-23
Attending: RADIOLOGY
Payer: COMMERCIAL

## 2025-07-23 VITALS
WEIGHT: 183.3 LBS | BODY MASS INDEX: 24.86 KG/M2 | SYSTOLIC BLOOD PRESSURE: 104 MMHG | HEART RATE: 62 BPM | DIASTOLIC BLOOD PRESSURE: 72 MMHG | OXYGEN SATURATION: 99 %

## 2025-07-23 DIAGNOSIS — Z17.0 MALIGNANT NEOPLASM OF OVERLAPPING SITES OF RIGHT BREAST IN FEMALE, ESTROGEN RECEPTOR POSITIVE (H): Primary | ICD-10-CM

## 2025-07-23 DIAGNOSIS — C50.811 MALIGNANT NEOPLASM OF OVERLAPPING SITES OF RIGHT BREAST IN FEMALE, ESTROGEN RECEPTOR POSITIVE (H): Primary | ICD-10-CM

## 2025-07-23 PROCEDURE — 77387 GUIDANCE FOR RADJ TX DLVR: CPT | Performed by: RADIOLOGY

## 2025-07-23 PROCEDURE — 1125F AMNT PAIN NOTED PAIN PRSNT: CPT | Performed by: RADIOLOGY

## 2025-07-23 PROCEDURE — G6002 STEREOSCOPIC X-RAY GUIDANCE: HCPCS | Mod: 26 | Performed by: RADIOLOGY

## 2025-07-23 PROCEDURE — 77412 RADIATION TX DELIVERY LVL 3: CPT | Performed by: RADIOLOGY

## 2025-07-23 PROCEDURE — 3078F DIAST BP <80 MM HG: CPT | Performed by: RADIOLOGY

## 2025-07-23 PROCEDURE — 3074F SYST BP LT 130 MM HG: CPT | Performed by: RADIOLOGY

## 2025-07-23 ASSESSMENT — PAIN SCALES - GENERAL: PAINLEVEL_OUTOF10: MILD PAIN (1)

## 2025-07-23 NOTE — PROGRESS NOTES
[facility] PHYSICIANS  SPECIALIZING IN BREAKTHROUGHS  Radiation Oncology    On Treatment Visit Note      Phoebe Alva      Date: 2025   MRN: [id number]   : 1985  Diagnosis: R breast cancer      Reason for Visit:  On Radiation Treatment Visit     Treatment Summary to Date  Treatment Site: R breast and axilla Current Dose: 795/5240 cGy Fractions: 3/20           ED Visit/Hospital Admission: none     Treatment Breaks: none       Subjective:  Phoebe presents for her first on treatment visit, accompanied by her partner Ric.  She has not noticed any skin changes. She has not started using Aquaphor.  She underwent planned oophorectomy last Friday. Intra-op, she was found to have numerous endometriosis implants and a partially ruptured endometrioma. She is recovering well from the procedure.     Nursing ROS:   Nutrition Alteration  Diet Type: Patient's Preference  Anorexia NCI: 0 - None  Skin  Skin Reaction: 0 - No changes  Skin Intervention: Aquaphor given and skin care teaching provided  CNS Alteration  CNS note: WNL     Cardiovascular  Respiratory effort: 1 - Normal - without distress  Gastrointestinal  Nausea: 0 - None  Vomitin - No vomiting (ons)     Psychosocial  Psychosocial Note: Denies concerns  Pain Assessment  Pain Note: 1/10 abdomen related to recent surgery      Objective:   /72   Pulse 62   Wt 83.1 kg (183 lb 4.8 oz)   LMP 2025 (Approximate)   SpO2 99%   BMI 24.86 kg/m    Gen: Appears well, in no acute distress  Skin: No erythema    Labs:  CBC RESULTS:   Recent Labs   Lab Test 25  1601   WBC 8.4   RBC 4.95   HGB 14.2   HCT 43.1   MCV 87   MCH 28.7   MCHC 32.9   RDW 12.5        ELECTROLYTES:  Recent Labs   Lab Test 25  1532      POTASSIUM 4.2   CHLORIDE 105   SHAYY 9.0   CO2 26   BUN 9.5   CR 0.74   GLC 92       Assessment:    Tolerating radiation therapy well.  All questions and concerns addressed.    Toxicities:  Pain: Grade 0: No  toxicity  Dermatitis: Grade 0: No toxicity    Plan:   Continue current therapy.    Counseled on use of skin cares.  Aquaphor provided.       Mosaiq chart and setup information reviewed  Ports checked    Medication Review  Med list reviewed with patient?: Yes    Educational Topic Discussed  Education Instructions: Skin care teaching     Antelmo Ordoñez MD  PGY-5 Radiation Oncology  Department of Radiation Oncology  [facility]  Phone: [phone]    Please do not send letter to referring physician.       I saw and examined the patient with the resident.  I have reviewed and edited the resident's note and agree with the plan of care.           Fermin Rose MD

## 2025-07-23 NOTE — LETTER
2025      Phoebe Alva  [address] [place] [facility] [address]      Dear Colleague,    Thank you for referring your patient, Phoebe Alva, to the [facility] RADIATION ONCOLOGY. Please see a copy of my visit note below.    [facility] PHYSICIANS  SPECIALIZING IN BREAKTHROUGHS  Radiation Oncology    On Treatment Visit Note      Phoebe Alva      Date: 2025   MRN: [id number]   : 1985  Diagnosis: R breast cancer      Reason for Visit:  On Radiation Treatment Visit     Treatment Summary to Date  Treatment Site: R breast and axilla Current Dose: 795/5240 cGy Fractions: 3/20           ED Visit/Hospital Admission: none     Treatment Breaks: none       Subjective:  Phoebe presents for her first on treatment visit, accompanied by her partner Ric.  She has not noticed any skin changes. She has not started using Aquaphor.  She underwent planned oophorectomy last Friday. Intra-op, she was found to have numerous endometriosis implants and a partially ruptured endometrioma. She is recovering well from the procedure.     Nursing ROS:   Nutrition Alteration  Diet Type: Patient's Preference  Anorexia NCI: 0 - None  Skin  Skin Reaction: 0 - No changes  Skin Intervention: Aquaphor given and skin care teaching provided  CNS Alteration  CNS note: WNL     Cardiovascular  Respiratory effort: 1 - Normal - without distress  Gastrointestinal  Nausea: 0 - None  Vomitin - No vomiting (ons)     Psychosocial  Psychosocial Note: Denies concerns  Pain Assessment  Pain Note: 1/10 abdomen related to recent surgery      Objective:   /72   Pulse 62   Wt 83.1 kg (183 lb 4.8 oz)   LMP 2025 (Approximate)   SpO2 99%   BMI 24.86 kg/m    Gen: Appears well, in no acute distress  Skin: No erythema    Labs:  CBC RESULTS:   Recent Labs   Lab Test 25  1601   WBC 8.4   RBC 4.95   HGB 14.2   HCT 43.1   MCV 87   MCH 28.7   MCHC 32.9   RDW 12.5        ELECTROLYTES:  Recent Labs   Lab  Test 01/09/25  1532      POTASSIUM 4.2   CHLORIDE 105   SHAYY 9.0   CO2 26   BUN 9.5   CR 0.74   GLC 92       Assessment:    Tolerating radiation therapy well.  All questions and concerns addressed.    Toxicities:  Pain: Grade 0: No toxicity  Dermatitis: Grade 0: No toxicity    Plan:   Continue current therapy.    Counseled on use of skin cares.  Aquaphor provided.       Mosaiq chart and setup information reviewed  Ports checked    Medication Review  Med list reviewed with patient?: Yes    Educational Topic Discussed  Education Instructions: Skin care teaching     Antelmo Ordoñez MD  PGY-5 Radiation Oncology  Department of Radiation Oncology  [facility]  Phone: [phone]    Please do not send letter to referring physician.       I saw and examined the patient with the resident.  I have reviewed and edited the resident's note and agree with the plan of care.           Fermin Rose MD     Again, thank you for allowing me to participate in the care of your patient.        Sincerely,        Fermin Rose MD    Electronically signed

## 2025-07-24 ENCOUNTER — APPOINTMENT (OUTPATIENT)
Dept: RADIATION ONCOLOGY | Facility: CLINIC | Age: 40
End: 2025-07-24
Attending: RADIOLOGY
Payer: COMMERCIAL

## 2025-07-24 PROCEDURE — 77387 GUIDANCE FOR RADJ TX DLVR: CPT | Performed by: RADIOLOGY

## 2025-07-24 PROCEDURE — 77412 RADIATION TX DELIVERY LVL 3: CPT | Performed by: RADIOLOGY

## 2025-07-24 PROCEDURE — G6002 STEREOSCOPIC X-RAY GUIDANCE: HCPCS | Mod: 26 | Performed by: RADIOLOGY

## 2025-07-25 ENCOUNTER — APPOINTMENT (OUTPATIENT)
Dept: RADIATION ONCOLOGY | Facility: CLINIC | Age: 40
End: 2025-07-25
Attending: RADIOLOGY
Payer: COMMERCIAL

## 2025-07-25 PROCEDURE — 77336 RADIATION PHYSICS CONSULT: CPT | Performed by: RADIOLOGY

## 2025-07-25 PROCEDURE — 77387 GUIDANCE FOR RADJ TX DLVR: CPT | Performed by: RADIOLOGY

## 2025-07-25 PROCEDURE — 77427 RADIATION TX MANAGEMENT X5: CPT | Mod: GC | Performed by: RADIOLOGY

## 2025-07-25 PROCEDURE — 77412 RADIATION TX DELIVERY LVL 3: CPT | Performed by: RADIOLOGY

## 2025-07-25 PROCEDURE — G6002 STEREOSCOPIC X-RAY GUIDANCE: HCPCS | Mod: 26 | Performed by: RADIOLOGY

## 2025-07-28 ENCOUNTER — APPOINTMENT (OUTPATIENT)
Dept: RADIATION ONCOLOGY | Facility: CLINIC | Age: 40
End: 2025-07-28
Attending: RADIOLOGY
Payer: COMMERCIAL

## 2025-07-28 PROCEDURE — 77412 RADIATION TX DELIVERY LVL 3: CPT | Performed by: RADIOLOGY

## 2025-07-28 PROCEDURE — 77387 GUIDANCE FOR RADJ TX DLVR: CPT | Performed by: RADIOLOGY

## 2025-07-28 PROCEDURE — G6002 STEREOSCOPIC X-RAY GUIDANCE: HCPCS | Mod: 26 | Performed by: RADIOLOGY

## 2025-07-29 ENCOUNTER — APPOINTMENT (OUTPATIENT)
Dept: RADIATION ONCOLOGY | Facility: CLINIC | Age: 40
End: 2025-07-29
Attending: RADIOLOGY
Payer: COMMERCIAL

## 2025-07-29 PROCEDURE — 77387 GUIDANCE FOR RADJ TX DLVR: CPT | Performed by: RADIOLOGY

## 2025-07-29 PROCEDURE — 77412 RADIATION TX DELIVERY LVL 3: CPT | Performed by: RADIOLOGY

## 2025-07-29 PROCEDURE — G6002 STEREOSCOPIC X-RAY GUIDANCE: HCPCS | Mod: 26 | Performed by: RADIOLOGY

## 2025-07-31 ENCOUNTER — APPOINTMENT (OUTPATIENT)
Dept: RADIATION ONCOLOGY | Facility: CLINIC | Age: 40
End: 2025-07-31
Attending: RADIOLOGY
Payer: COMMERCIAL

## 2025-07-31 PROCEDURE — 77387 GUIDANCE FOR RADJ TX DLVR: CPT | Performed by: RADIOLOGY

## 2025-07-31 PROCEDURE — G6002 STEREOSCOPIC X-RAY GUIDANCE: HCPCS | Mod: 26 | Performed by: RADIOLOGY

## 2025-07-31 PROCEDURE — 77427 RADIATION TX MANAGEMENT X5: CPT | Mod: GC | Performed by: RADIOLOGY

## 2025-07-31 PROCEDURE — 77412 RADIATION TX DELIVERY LVL 3: CPT | Performed by: RADIOLOGY

## 2025-08-01 ENCOUNTER — APPOINTMENT (OUTPATIENT)
Dept: RADIATION ONCOLOGY | Facility: CLINIC | Age: 40
End: 2025-08-01
Attending: RADIOLOGY
Payer: COMMERCIAL

## 2025-08-04 ENCOUNTER — OFFICE VISIT (OUTPATIENT)
Dept: RADIATION ONCOLOGY | Facility: CLINIC | Age: 40
End: 2025-08-04
Attending: RADIOLOGY
Payer: COMMERCIAL

## 2025-08-04 VITALS
BODY MASS INDEX: 24.82 KG/M2 | WEIGHT: 183 LBS | DIASTOLIC BLOOD PRESSURE: 74 MMHG | OXYGEN SATURATION: 99 % | SYSTOLIC BLOOD PRESSURE: 110 MMHG | HEART RATE: 69 BPM

## 2025-08-04 DIAGNOSIS — Z17.0 MALIGNANT NEOPLASM OF OVERLAPPING SITES OF RIGHT BREAST IN FEMALE, ESTROGEN RECEPTOR POSITIVE (H): Primary | ICD-10-CM

## 2025-08-04 DIAGNOSIS — C50.811 MALIGNANT NEOPLASM OF OVERLAPPING SITES OF RIGHT BREAST IN FEMALE, ESTROGEN RECEPTOR POSITIVE (H): Primary | ICD-10-CM

## 2025-08-04 PROCEDURE — 3078F DIAST BP <80 MM HG: CPT | Performed by: RADIOLOGY

## 2025-08-04 PROCEDURE — G6002 STEREOSCOPIC X-RAY GUIDANCE: HCPCS | Mod: 26 | Performed by: RADIOLOGY

## 2025-08-04 PROCEDURE — 77412 RADIATION TX DELIVERY LVL 3: CPT | Performed by: RADIOLOGY

## 2025-08-04 PROCEDURE — 3074F SYST BP LT 130 MM HG: CPT | Performed by: RADIOLOGY

## 2025-08-04 PROCEDURE — 77336 RADIATION PHYSICS CONSULT: CPT | Performed by: RADIOLOGY

## 2025-08-04 PROCEDURE — 77387 GUIDANCE FOR RADJ TX DLVR: CPT | Performed by: RADIOLOGY

## 2025-08-05 ENCOUNTER — APPOINTMENT (OUTPATIENT)
Dept: RADIATION ONCOLOGY | Facility: CLINIC | Age: 40
End: 2025-08-05
Attending: RADIOLOGY
Payer: COMMERCIAL

## 2025-08-05 PROCEDURE — G6002 STEREOSCOPIC X-RAY GUIDANCE: HCPCS | Mod: 26 | Performed by: RADIOLOGY

## 2025-08-05 PROCEDURE — 77412 RADIATION TX DELIVERY LVL 3: CPT | Performed by: RADIOLOGY

## 2025-08-05 PROCEDURE — 77387 GUIDANCE FOR RADJ TX DLVR: CPT | Performed by: RADIOLOGY

## 2025-08-06 ENCOUNTER — APPOINTMENT (OUTPATIENT)
Dept: RADIATION ONCOLOGY | Facility: CLINIC | Age: 40
End: 2025-08-06
Attending: RADIOLOGY
Payer: COMMERCIAL

## 2025-08-06 PROCEDURE — 77412 RADIATION TX DELIVERY LVL 3: CPT | Performed by: RADIOLOGY

## 2025-08-06 PROCEDURE — G6002 STEREOSCOPIC X-RAY GUIDANCE: HCPCS | Mod: 26 | Performed by: RADIOLOGY

## 2025-08-06 PROCEDURE — 77387 GUIDANCE FOR RADJ TX DLVR: CPT | Performed by: RADIOLOGY

## 2025-08-07 ENCOUNTER — APPOINTMENT (OUTPATIENT)
Dept: RADIATION ONCOLOGY | Facility: CLINIC | Age: 40
End: 2025-08-07
Attending: RADIOLOGY
Payer: COMMERCIAL

## 2025-08-07 ENCOUNTER — MYC MEDICAL ADVICE (OUTPATIENT)
Dept: OBGYN | Facility: CLINIC | Age: 40
End: 2025-08-07
Payer: COMMERCIAL

## 2025-08-08 ENCOUNTER — APPOINTMENT (OUTPATIENT)
Dept: RADIATION ONCOLOGY | Facility: CLINIC | Age: 40
End: 2025-08-08
Attending: RADIOLOGY
Payer: COMMERCIAL

## 2025-08-11 ENCOUNTER — APPOINTMENT (OUTPATIENT)
Dept: RADIATION ONCOLOGY | Facility: CLINIC | Age: 40
End: 2025-08-11
Attending: RADIOLOGY
Payer: COMMERCIAL

## 2025-08-11 PROCEDURE — 77336 RADIATION PHYSICS CONSULT: CPT | Performed by: RADIOLOGY

## 2025-08-11 PROCEDURE — 77412 RADIATION TX DELIVERY LVL 3: CPT | Performed by: RADIOLOGY

## 2025-08-11 PROCEDURE — 77387 GUIDANCE FOR RADJ TX DLVR: CPT | Performed by: RADIOLOGY

## 2025-08-11 PROCEDURE — G6002 STEREOSCOPIC X-RAY GUIDANCE: HCPCS | Mod: 26 | Performed by: STUDENT IN AN ORGANIZED HEALTH CARE EDUCATION/TRAINING PROGRAM

## 2025-08-12 ENCOUNTER — APPOINTMENT (OUTPATIENT)
Dept: RADIATION ONCOLOGY | Facility: CLINIC | Age: 40
End: 2025-08-12
Attending: RADIOLOGY
Payer: COMMERCIAL

## 2025-08-12 PROCEDURE — 77412 RADIATION TX DELIVERY LVL 3: CPT | Performed by: RADIOLOGY

## 2025-08-12 PROCEDURE — G6002 STEREOSCOPIC X-RAY GUIDANCE: HCPCS | Mod: 26 | Performed by: RADIOLOGY

## 2025-08-12 PROCEDURE — 77387 GUIDANCE FOR RADJ TX DLVR: CPT | Performed by: RADIOLOGY

## 2025-08-13 ENCOUNTER — APPOINTMENT (OUTPATIENT)
Dept: RADIATION ONCOLOGY | Facility: CLINIC | Age: 40
End: 2025-08-13
Attending: RADIOLOGY
Payer: COMMERCIAL

## 2025-08-14 ENCOUNTER — APPOINTMENT (OUTPATIENT)
Dept: RADIATION ONCOLOGY | Facility: CLINIC | Age: 40
End: 2025-08-14
Attending: RADIOLOGY
Payer: COMMERCIAL

## 2025-08-14 VITALS — DIASTOLIC BLOOD PRESSURE: 73 MMHG | OXYGEN SATURATION: 97 % | SYSTOLIC BLOOD PRESSURE: 110 MMHG | HEART RATE: 72 BPM

## 2025-08-14 DIAGNOSIS — Z17.0 MALIGNANT NEOPLASM OF OVERLAPPING SITES OF RIGHT BREAST IN FEMALE, ESTROGEN RECEPTOR POSITIVE (H): Primary | ICD-10-CM

## 2025-08-14 DIAGNOSIS — C50.811 MALIGNANT NEOPLASM OF OVERLAPPING SITES OF RIGHT BREAST IN FEMALE, ESTROGEN RECEPTOR POSITIVE (H): Primary | ICD-10-CM

## 2025-08-14 PROCEDURE — 77412 RADIATION TX DELIVERY LVL 3: CPT | Performed by: STUDENT IN AN ORGANIZED HEALTH CARE EDUCATION/TRAINING PROGRAM

## 2025-08-15 ENCOUNTER — APPOINTMENT (OUTPATIENT)
Dept: RADIATION ONCOLOGY | Facility: CLINIC | Age: 40
End: 2025-08-15
Attending: RADIOLOGY
Payer: COMMERCIAL

## 2025-08-18 ENCOUNTER — APPOINTMENT (OUTPATIENT)
Dept: RADIATION ONCOLOGY | Facility: CLINIC | Age: 40
End: 2025-08-18
Attending: RADIOLOGY
Payer: COMMERCIAL

## 2025-08-18 PROCEDURE — 77427 RADIATION TX MANAGEMENT X5: CPT | Performed by: STUDENT IN AN ORGANIZED HEALTH CARE EDUCATION/TRAINING PROGRAM

## 2025-08-18 PROCEDURE — 77336 RADIATION PHYSICS CONSULT: CPT | Performed by: RADIOLOGY

## 2025-08-18 PROCEDURE — 77412 RADIATION TX DELIVERY LVL 3: CPT | Performed by: RADIOLOGY

## 2025-08-26 ENCOUNTER — ONCOLOGY VISIT (OUTPATIENT)
Dept: ONCOLOGY | Facility: CLINIC | Age: 40
End: 2025-08-26
Attending: HOSPITALIST
Payer: COMMERCIAL

## 2025-08-26 ENCOUNTER — LAB (OUTPATIENT)
Dept: LAB | Facility: CLINIC | Age: 40
End: 2025-08-26
Attending: HOSPITALIST
Payer: COMMERCIAL

## 2025-08-26 VITALS
DIASTOLIC BLOOD PRESSURE: 75 MMHG | BODY MASS INDEX: 25.02 KG/M2 | WEIGHT: 184.5 LBS | SYSTOLIC BLOOD PRESSURE: 125 MMHG | TEMPERATURE: 97.4 F | HEART RATE: 77 BPM | OXYGEN SATURATION: 100 % | RESPIRATION RATE: 16 BRPM

## 2025-08-26 DIAGNOSIS — C50.811 MALIGNANT NEOPLASM OF OVERLAPPING SITES OF RIGHT BREAST IN FEMALE, ESTROGEN RECEPTOR POSITIVE (H): Primary | ICD-10-CM

## 2025-08-26 DIAGNOSIS — Z17.0 MALIGNANT NEOPLASM OF OVERLAPPING SITES OF RIGHT BREAST IN FEMALE, ESTROGEN RECEPTOR POSITIVE (H): Primary | ICD-10-CM

## 2025-08-26 DIAGNOSIS — Z17.0 MALIGNANT NEOPLASM OF OVERLAPPING SITES OF RIGHT BREAST IN FEMALE, ESTROGEN RECEPTOR POSITIVE (H): ICD-10-CM

## 2025-08-26 DIAGNOSIS — C50.811 MALIGNANT NEOPLASM OF OVERLAPPING SITES OF RIGHT BREAST IN FEMALE, ESTROGEN RECEPTOR POSITIVE (H): ICD-10-CM

## 2025-08-26 PROBLEM — C50.411 MALIGNANT NEOPLASM OF UPPER-OUTER QUADRANT OF RIGHT BREAST IN FEMALE, ESTROGEN RECEPTOR POSITIVE (H): Status: RESOLVED | Noted: 2025-05-29 | Resolved: 2025-08-26

## 2025-08-26 LAB — ESTRADIOL SERPL-MCNC: <5 PG/ML

## 2025-08-26 PROCEDURE — G2211 COMPLEX E/M VISIT ADD ON: HCPCS | Performed by: HOSPITALIST

## 2025-08-26 PROCEDURE — 99213 OFFICE O/P EST LOW 20 MIN: CPT | Performed by: HOSPITALIST

## 2025-08-26 PROCEDURE — 36415 COLL VENOUS BLD VENIPUNCTURE: CPT | Performed by: PATHOLOGY

## 2025-08-26 PROCEDURE — 82670 ASSAY OF TOTAL ESTRADIOL: CPT | Performed by: HOSPITALIST

## 2025-08-26 PROCEDURE — 99000 SPECIMEN HANDLING OFFICE-LAB: CPT | Performed by: PATHOLOGY

## 2025-08-26 PROCEDURE — 99214 OFFICE O/P EST MOD 30 MIN: CPT | Performed by: HOSPITALIST

## 2025-08-26 RX ORDER — LETROZOLE 2.5 MG/1
2.5 TABLET, FILM COATED ORAL DAILY
Qty: 90 TABLET | Refills: 3 | Status: SHIPPED | OUTPATIENT
Start: 2025-08-26

## 2025-08-26 ASSESSMENT — PAIN SCALES - GENERAL: PAINLEVEL_OUTOF10: NO PAIN (0)

## 2025-08-28 ENCOUNTER — ONCOLOGY VISIT (OUTPATIENT)
Dept: RADIATION ONCOLOGY | Facility: CLINIC | Age: 40
End: 2025-08-28
Payer: COMMERCIAL

## (undated) DEVICE — TRAY PREP DRY SKIN SCRUB 067

## (undated) DEVICE — SYR 01ML 27GA 0.5" NDL TBC 309623

## (undated) DEVICE — Device

## (undated) DEVICE — ENDO TROCAR FIRST ENTRY KII FIOS ADV FIX 12X100MM CFF73

## (undated) DEVICE — ESU ELEC BLADE 2.75" COATED/INSULATED E1455

## (undated) DEVICE — SUCTION MANIFOLD NEPTUNE 2 SYS 4 PORT 0702-020-000

## (undated) DEVICE — SET BREAST BIOPSY LOCALIZER 20 PROBE 8MM PENCIL 09-0006

## (undated) DEVICE — SUCTION MANIFOLD NEPTUNE 2 SYS 1 PORT 702-025-000

## (undated) DEVICE — JELLY LUBRICATING SURGILUBE 2OZ TUBE 0281-0205-02

## (undated) DEVICE — PEN MARKING SKIN W/PAPER RULER 31145785

## (undated) DEVICE — NDL COUNTER 40CT  31142311

## (undated) DEVICE — ESU GROUND PAD ADULT W/CORD E7507

## (undated) DEVICE — ESU ENDO SCISSORS 5MM CVD 5DCS

## (undated) DEVICE — NDL ECLIPSE 25GA 1.5"

## (undated) DEVICE — SUCTION IRR STRYKERFLOW II W/TIP 250-070-520

## (undated) DEVICE — DRAPE SHEET REV FOLD 3/4 9349

## (undated) DEVICE — PANTIES MESH LG/XLG 2PK 706M2

## (undated) DEVICE — LINEN GOWN X4 5410

## (undated) DEVICE — GLOVE BIOGEL PI ULTRATOUCH SZ 6.5 41165

## (undated) DEVICE — ENDO TROCAR SLEEVE KII ADV FIXATION 05X100MM CFS02

## (undated) DEVICE — SURGICEL POWDER ABSORBABLE HEMOSTAT 3GM 3013SP

## (undated) DEVICE — SURGICEL ABSORBABLE HEMOSTAT SNOW 2"X4" 2082

## (undated) DEVICE — CLIP HORIZON SM RED WIDE SLOT 001201

## (undated) DEVICE — DRSG TELFA 3X8" 1238

## (undated) DEVICE — DRSG STERI STRIP 1/2X4" R1547

## (undated) DEVICE — LINEN TOWEL PACK X5 5464

## (undated) DEVICE — PREP DYNA-HEX 4% CHG SCRUB 4OZ BOTTLE MDS098710

## (undated) DEVICE — SU VICRYL 4-0 PS-2 18" UND J496H

## (undated) DEVICE — ENDO POUCH UNIV RETRIEVAL SYSTEM INZII 10MM CD001

## (undated) DEVICE — BLADE CLIPPER DISP 4406

## (undated) DEVICE — TUBING IRRIG CYSTO/BLADDER SET 82" LF V4608-20

## (undated) DEVICE — ESU GROUND PAD UNIVERSAL W/O CORD

## (undated) DEVICE — SOL NACL 0.9% IRRIG 500ML BOTTLE 2F7123

## (undated) DEVICE — PREP CHLORAPREP 26ML TINTED HI-LITE ORANGE 930815

## (undated) DEVICE — ENDO SCOPE WARMER LF TM500

## (undated) DEVICE — ENDO TROCAR FIRST ENTRY KII FIOS ADV FIX 05X100MM CFF03

## (undated) DEVICE — DRAPE U SPLIT 74X120" 29440

## (undated) DEVICE — STRAP KNEE/BODY 31143004

## (undated) DEVICE — SOLUTION IRRIGATION 0.9% NACL 1000ML BOTTLE R5200-01

## (undated) DEVICE — SURGICEL ABSORBABLE HEMOSTAT SNOW 4"X4" 2083

## (undated) DEVICE — SU MONOCRYL 4-0 P-3 18" UND Y494G

## (undated) DEVICE — SU VICRYL+ 3-0 27IN SH UND VCP416H

## (undated) DEVICE — NDL 30GA 0.5" 305106

## (undated) DEVICE — PAD CHUX UNDERPAD 30X36" P3036C

## (undated) DEVICE — DRSG PRIMAPORE 02X3" 7133

## (undated) DEVICE — DRSG PRIMAPORE 03 1/8X6" 66000318

## (undated) DEVICE — GLOVE BIOGEL PI MICRO SZ 6.0 48560

## (undated) DEVICE — ESU HOLDER LAP INST DISP PURPLE LONG 330MM H-PRO-330

## (undated) DEVICE — UNDERPAD 36X30 PREMIERPRO MAX ABS NS LF 676111

## (undated) DEVICE — CLIP HORIZON MED BLUE 002200

## (undated) DEVICE — ESU LIGASURE LAPAROSCOPIC BLUNT TIP SEALER 5MMX37CM LF1837

## (undated) DEVICE — NDL INSUFFLATION 13GA 120MM C2201

## (undated) DEVICE — GOWN LG DISP 9515

## (undated) DEVICE — SOLUTION WATER 1000ML BOTTLE R5000-01

## (undated) DEVICE — APPLICATOR ENDOSCOPIC 5 SURGICEL POWDER 3123SPEA

## (undated) DEVICE — GLOVE BIOGEL PI MICRO INDICATOR UNDERGLOVE SZ 7.0 48970

## (undated) DEVICE — GLOVE PROTEXIS BLUE W/NEU-THERA 7.0  2D73EB70

## (undated) DEVICE — SOLUTION IV 0.9% NACL 1000ML E8000

## (undated) DEVICE — DECANTER FLUID L3 IN TRANSFER STRL LF DYNJDEC03

## (undated) DEVICE — COVER CAMERA IN-LIGHT DISP LT-C02

## (undated) DEVICE — DRAPE IOBAN INCISE 23X17" 6650EZ

## (undated) DEVICE — PACK MINOR CUSTOM ASC

## (undated) DEVICE — GLOVE BIOGEL PI MICRO INDICATOR UNDERGLOVE SZ 6.0 48960

## (undated) DEVICE — MARKER MARGIN MARKER STD 6 COLOR SGL USE MMS6

## (undated) DEVICE — ENDO POUCH UNIVERSAL RETRIEVAL SYSTEM INZII 12/15MM CD004

## (undated) DEVICE — PAD PERI INDIV WRAP 11" NON241286

## (undated) DEVICE — CATH TRAY FOLEY SURESTEP 16FR WDRAIN BAG STLK LATEX A300316A

## (undated) DEVICE — TUBING SMOKE EVAC PNEUMOCLEAR HEATED 0620050350

## (undated) RX ORDER — ACETAMINOPHEN 325 MG/1
TABLET ORAL
Status: DISPENSED
Start: 2025-05-13

## (undated) RX ORDER — PROPOFOL 10 MG/ML
INJECTION, EMULSION INTRAVENOUS
Status: DISPENSED
Start: 2025-05-13

## (undated) RX ORDER — FENTANYL CITRATE 50 UG/ML
INJECTION, SOLUTION INTRAMUSCULAR; INTRAVENOUS
Status: DISPENSED
Start: 2025-06-05

## (undated) RX ORDER — ACETAMINOPHEN 325 MG/1
TABLET ORAL
Status: DISPENSED
Start: 2025-07-18

## (undated) RX ORDER — HYDROMORPHONE HYDROCHLORIDE 1 MG/ML
INJECTION, SOLUTION INTRAMUSCULAR; INTRAVENOUS; SUBCUTANEOUS
Status: DISPENSED
Start: 2025-05-13

## (undated) RX ORDER — HYDROMORPHONE HYDROCHLORIDE 1 MG/ML
INJECTION, SOLUTION INTRAMUSCULAR; INTRAVENOUS; SUBCUTANEOUS
Status: DISPENSED
Start: 2025-07-18

## (undated) RX ORDER — PROPOFOL 10 MG/ML
INJECTION, EMULSION INTRAVENOUS
Status: DISPENSED
Start: 2025-07-18

## (undated) RX ORDER — PROPOFOL 10 MG/ML
INJECTION, EMULSION INTRAVENOUS
Status: DISPENSED
Start: 2025-06-05

## (undated) RX ORDER — CEFAZOLIN SODIUM/WATER 2 G/20 ML
SYRINGE (ML) INTRAVENOUS
Status: DISPENSED
Start: 2025-07-18

## (undated) RX ORDER — FENTANYL CITRATE 50 UG/ML
INJECTION, SOLUTION INTRAMUSCULAR; INTRAVENOUS
Status: DISPENSED
Start: 2025-07-18

## (undated) RX ORDER — DEXAMETHASONE SODIUM PHOSPHATE 4 MG/ML
INJECTION, SOLUTION INTRA-ARTICULAR; INTRALESIONAL; INTRAMUSCULAR; INTRAVENOUS; SOFT TISSUE
Status: DISPENSED
Start: 2025-05-13

## (undated) RX ORDER — FENTANYL CITRATE 50 UG/ML
INJECTION, SOLUTION INTRAMUSCULAR; INTRAVENOUS
Status: DISPENSED
Start: 2025-05-13

## (undated) RX ORDER — CEFAZOLIN SODIUM 2 G/50ML
SOLUTION INTRAVENOUS
Status: DISPENSED
Start: 2025-05-13

## (undated) RX ORDER — ACETAMINOPHEN 325 MG/1
TABLET ORAL
Status: DISPENSED
Start: 2025-06-05

## (undated) RX ORDER — KETOROLAC TROMETHAMINE 30 MG/ML
INJECTION, SOLUTION INTRAMUSCULAR; INTRAVENOUS
Status: DISPENSED
Start: 2025-05-13

## (undated) RX ORDER — ISOSULFAN BLUE 50 MG/5ML
INJECTION, SOLUTION SUBCUTANEOUS
Status: DISPENSED
Start: 2025-05-13

## (undated) RX ORDER — ONDANSETRON 2 MG/ML
INJECTION INTRAMUSCULAR; INTRAVENOUS
Status: DISPENSED
Start: 2025-06-05

## (undated) RX ORDER — CEFAZOLIN SODIUM 2 G/50ML
SOLUTION INTRAVENOUS
Status: DISPENSED
Start: 2025-06-05

## (undated) RX ORDER — FENTANYL CITRATE-0.9 % NACL/PF 10 MCG/ML
PLASTIC BAG, INJECTION (ML) INTRAVENOUS
Status: DISPENSED
Start: 2025-05-13

## (undated) RX ORDER — PHENAZOPYRIDINE HYDROCHLORIDE 200 MG/1
TABLET, FILM COATED ORAL
Status: DISPENSED
Start: 2025-07-18

## (undated) RX ORDER — BUPIVACAINE HYDROCHLORIDE 2.5 MG/ML
INJECTION, SOLUTION EPIDURAL; INFILTRATION; INTRACAUDAL; PERINEURAL
Status: DISPENSED
Start: 2025-06-05

## (undated) RX ORDER — GLYCOPYRROLATE 0.2 MG/ML
INJECTION, SOLUTION INTRAMUSCULAR; INTRAVENOUS
Status: DISPENSED
Start: 2025-06-05

## (undated) RX ORDER — BUPIVACAINE HYDROCHLORIDE 2.5 MG/ML
INJECTION, SOLUTION INFILTRATION; PERINEURAL
Status: DISPENSED
Start: 2025-07-18

## (undated) RX ORDER — DEXAMETHASONE SODIUM PHOSPHATE 4 MG/ML
INJECTION, SOLUTION INTRA-ARTICULAR; INTRALESIONAL; INTRAMUSCULAR; INTRAVENOUS; SOFT TISSUE
Status: DISPENSED
Start: 2025-07-18

## (undated) RX ORDER — DEXAMETHASONE SODIUM PHOSPHATE 4 MG/ML
INJECTION, SOLUTION INTRA-ARTICULAR; INTRALESIONAL; INTRAMUSCULAR; INTRAVENOUS; SOFT TISSUE
Status: DISPENSED
Start: 2025-06-05

## (undated) RX ORDER — ONDANSETRON 2 MG/ML
INJECTION INTRAMUSCULAR; INTRAVENOUS
Status: DISPENSED
Start: 2025-07-18

## (undated) RX ORDER — ONDANSETRON 2 MG/ML
INJECTION INTRAMUSCULAR; INTRAVENOUS
Status: DISPENSED
Start: 2025-05-13

## (undated) RX ORDER — OXYCODONE HYDROCHLORIDE 5 MG/1
TABLET ORAL
Status: DISPENSED
Start: 2025-07-18